# Patient Record
Sex: FEMALE | Race: WHITE | NOT HISPANIC OR LATINO | Employment: FULL TIME | URBAN - METROPOLITAN AREA
[De-identification: names, ages, dates, MRNs, and addresses within clinical notes are randomized per-mention and may not be internally consistent; named-entity substitution may affect disease eponyms.]

---

## 2017-11-10 ENCOUNTER — TRANSFERRED RECORDS (OUTPATIENT)
Dept: HEALTH INFORMATION MANAGEMENT | Facility: CLINIC | Age: 52
End: 2017-11-10

## 2018-02-21 ENCOUNTER — TRANSFERRED RECORDS (OUTPATIENT)
Dept: HEALTH INFORMATION MANAGEMENT | Facility: CLINIC | Age: 53
End: 2018-02-21

## 2018-02-21 LAB — TSH SERPL-ACNC: 2.51 UIU/ML (ref 0.3–5)

## 2018-09-06 ENCOUNTER — TRANSFERRED RECORDS (OUTPATIENT)
Dept: HEALTH INFORMATION MANAGEMENT | Facility: CLINIC | Age: 53
End: 2018-09-06

## 2018-12-13 ENCOUNTER — TRANSFERRED RECORDS (OUTPATIENT)
Dept: HEALTH INFORMATION MANAGEMENT | Facility: CLINIC | Age: 53
End: 2018-12-13

## 2019-04-11 ENCOUNTER — TRANSFERRED RECORDS (OUTPATIENT)
Dept: HEALTH INFORMATION MANAGEMENT | Facility: CLINIC | Age: 54
End: 2019-04-11

## 2019-05-28 ENCOUNTER — OFFICE VISIT (OUTPATIENT)
Dept: FAMILY MEDICINE | Facility: CLINIC | Age: 54
End: 2019-05-28
Payer: COMMERCIAL

## 2019-05-28 VITALS
HEIGHT: 64 IN | TEMPERATURE: 98.2 F | SYSTOLIC BLOOD PRESSURE: 135 MMHG | OXYGEN SATURATION: 98 % | RESPIRATION RATE: 17 BRPM | WEIGHT: 238 LBS | BODY MASS INDEX: 40.63 KG/M2 | HEART RATE: 65 BPM | DIASTOLIC BLOOD PRESSURE: 83 MMHG

## 2019-05-28 DIAGNOSIS — E78.5 HYPERLIPIDEMIA LDL GOAL <130: ICD-10-CM

## 2019-05-28 DIAGNOSIS — Z13.21 ENCOUNTER FOR VITAMIN DEFICIENCY SCREENING: ICD-10-CM

## 2019-05-28 DIAGNOSIS — E66.01 MORBID OBESITY (H): ICD-10-CM

## 2019-05-28 DIAGNOSIS — Z13.220 SCREENING FOR HYPERLIPIDEMIA: ICD-10-CM

## 2019-05-28 DIAGNOSIS — Z00.01 ENCOUNTER FOR ROUTINE ADULT MEDICAL EXAM WITH ABNORMAL FINDINGS: Primary | ICD-10-CM

## 2019-05-28 DIAGNOSIS — Z12.31 VISIT FOR SCREENING MAMMOGRAM: ICD-10-CM

## 2019-05-28 DIAGNOSIS — Z13.1 SCREENING FOR DIABETES MELLITUS: ICD-10-CM

## 2019-05-28 LAB
ALBUMIN SERPL-MCNC: 3.9 G/DL (ref 3.4–5)
ALP SERPL-CCNC: 93 U/L (ref 40–150)
ALT SERPL W P-5'-P-CCNC: 90 U/L (ref 0–50)
ANION GAP SERPL CALCULATED.3IONS-SCNC: 4 MMOL/L (ref 3–14)
AST SERPL W P-5'-P-CCNC: 39 U/L (ref 0–45)
BILIRUB SERPL-MCNC: 0.5 MG/DL (ref 0.2–1.3)
BUN SERPL-MCNC: 17 MG/DL (ref 7–30)
CALCIUM SERPL-MCNC: 8.8 MG/DL (ref 8.5–10.1)
CHLORIDE SERPL-SCNC: 107 MMOL/L (ref 94–109)
CHOLEST SERPL-MCNC: 228 MG/DL
CO2 SERPL-SCNC: 28 MMOL/L (ref 20–32)
CREAT SERPL-MCNC: 0.68 MG/DL (ref 0.52–1.04)
GFR SERPL CREATININE-BSD FRML MDRD: >90 ML/MIN/{1.73_M2}
GLUCOSE SERPL-MCNC: 101 MG/DL (ref 70–99)
HDLC SERPL-MCNC: 60 MG/DL
LDLC SERPL CALC-MCNC: 138 MG/DL
NONHDLC SERPL-MCNC: 168 MG/DL
POTASSIUM SERPL-SCNC: 3.8 MMOL/L (ref 3.4–5.3)
PROT SERPL-MCNC: 7.6 G/DL (ref 6.8–8.8)
SODIUM SERPL-SCNC: 139 MMOL/L (ref 133–144)
TRIGL SERPL-MCNC: 150 MG/DL

## 2019-05-28 PROCEDURE — 82306 VITAMIN D 25 HYDROXY: CPT | Performed by: PHYSICIAN ASSISTANT

## 2019-05-28 PROCEDURE — 99386 PREV VISIT NEW AGE 40-64: CPT | Performed by: PHYSICIAN ASSISTANT

## 2019-05-28 PROCEDURE — 80053 COMPREHEN METABOLIC PANEL: CPT | Performed by: PHYSICIAN ASSISTANT

## 2019-05-28 PROCEDURE — 36415 COLL VENOUS BLD VENIPUNCTURE: CPT | Performed by: PHYSICIAN ASSISTANT

## 2019-05-28 PROCEDURE — 80061 LIPID PANEL: CPT | Performed by: PHYSICIAN ASSISTANT

## 2019-05-28 RX ORDER — LEVOTHYROXINE SODIUM 100 UG/1
100 TABLET ORAL DAILY
COMMUNITY
Start: 2018-11-30 | End: 2020-01-15 | Stop reason: DRUGHIGH

## 2019-05-28 ASSESSMENT — PAIN SCALES - GENERAL: PAINLEVEL: NO PAIN (0)

## 2019-05-28 ASSESSMENT — MIFFLIN-ST. JEOR: SCORE: 1664.56

## 2019-05-28 NOTE — PROGRESS NOTES
SUBJECTIVE:   CC: Thelma Uribe is an 53 year old woman who presents for preventive health visit.     Healthy Habits:    Do you get at least three servings of calcium containing foods daily (dairy, green leafy vegetables, etc.)? yes    Amount of exercise or daily activities, outside of work: 3 day(s) per week    Problems taking medications regularly No    Medication side effects: No    Have you had an eye exam in the past two years? yes    Do you see a dentist twice per year? yes    Do you have sleep apnea, excessive snoring or daytime drowsiness?yes, has CPAP          Today's PHQ-2 Score:   PHQ-2 ( 1999 [employer]) 5/28/2019 2/19/2016   Q1: Little interest or pleasure in doing things 0 0   Q2: Feeling down, depressed or hopeless 0 0   PHQ-2 Score 0 0       Abuse: Current or Past(Physical, Sexual or Emotional)- No  Do you feel safe in your environment? Yes    Social History     Tobacco Use     Smoking status: Never Smoker     Smokeless tobacco: Never Used   Substance Use Topics     Alcohol use: Yes     Alcohol/week: 0.0 oz     Comment: 2-3 Drinks Per Week     If you drink alcohol do you typically have >3 drinks per day or >7 drinks per week? No                     Reviewed orders with patient.  Reviewed health maintenance and updated orders accordingly - Yes  BP Readings from Last 3 Encounters:   05/28/19 135/83   06/20/16 109/81   02/19/16 128/82    Wt Readings from Last 3 Encounters:   05/28/19 108 kg (238 lb)   06/14/16 101.2 kg (223 lb)   02/19/16 104.8 kg (231 lb)                  Patient Active Problem List   Diagnosis     CARDIOVASCULAR SCREENING; LDL GOAL LESS THAN 160     Thyroid nodule     GERD (gastroesophageal reflux disease)     Family history of ischemic heart disease     Morbid obesity (H)     Past Surgical History:   Procedure Laterality Date     C APPENDECTOMY  Age 11     C NONSPECIFIC PROCEDURE  1988    Right oophorectomy and dermoid cyst removal     C NONSPECIFIC PROCEDURE  1997    Partial  Left oophorectomy and dermoid cyst removal     C NONSPECIFIC PROCEDURE      L 4-5 herniated disc w/ laminectomy     C NONSPECIFIC PROCEDURE  2003    Midcarpal Instability-Right wrist reconstruction     C NONSPECIFIC PROCEDURE      Left breast biopsy-benign     COLONOSCOPY WITH CO2 INSUFFLATION N/A 2016    Procedure: COLONOSCOPY WITH CO2 INSUFFLATION;  Surgeon: Miguelito Erazo MD;  Location: [facility] OR     GYN SURGERY       HC REMOVE TONSILS/ADENOIDS,12+ Y/O  Age 14     ORTHOPEDIC SURGERY       THYROIDECTOMY  2012    Procedure:THYROIDECTOMY; LEFT THYROID LOBECTOMY; Surgeon:STEPHANIA FERNÁNDEZ; Location: OR     UPPER GI ENDOSCOPY      bx's okay, rec different PPI       Social History     Tobacco Use     Smoking status: Never Smoker     Smokeless tobacco: Never Used   Substance Use Topics     Alcohol use: Yes     Alcohol/week: 0.0 oz     Comment: 2-3 Drinks Per Week     Family History   Problem Relation Age of Onset     C.A.D. Father 65        Quadruple bypass- age 65     Hypertension Father      Prostate Cancer Father      Blood Disease Father         Anti-cardiolipin syndrome, passed away from aneursym      Lipids Father      Thyroid Disease Father         Hypothyroid     Diabetes Maternal Grandmother         Type 2     Hypertension Mother      Arthritis Mother      Lipids Mother      Osteoporosis Mother         on meds, did have metarsal fx from slip     Thyroid Disease Mother         Hypothyroid     Coronary Artery Disease Brother 54        Stent at 54 yrs     Blood Disease Sister         Vonwillenbrand Syndrome     Cancer Sister 56         of pancreatic cancer w/in 3 mo of dx     Other Cancer Brother         kidney cancer     Other Cancer Sister 59        pancreatic- , smoker     Coronary Artery Disease Brother 49        CABG, smoker     Kidney Cancer Brother 49        smoker     Hyperlipidemia No family hx of      Cerebrovascular Disease No family hx of      Breast Cancer No family  hx of      Colon Cancer No family hx of      Depression No family hx of      Anxiety Disorder No family hx of      Mental Illness No family hx of      Substance Abuse No family hx of      Anesthesia Reaction No family hx of      Asthma No family hx of      Genetic Disorder No family hx of      Obesity No family hx of      Unknown/Adopted No family hx of          Current Outpatient Medications   Medication Sig Dispense Refill     levothyroxine (SYNTHROID/LEVOTHROID) 100 MCG tablet Take 100 mcg by mouth daily         Mammogram Screening: Patient over age 50, mutual decision to screen reflected in health maintenance.    Pertinent mammograms are reviewed under the imaging tab.  History of abnormal Pap smear: NO - age 30-65 PAP every 5 years with negative HPV co-testing recommended  PAP / HPV Latest Ref Rng & Units 2/19/2016 9/25/2012 5/13/2008   PAP - NIL NIL NIL   HPV 16 DNA NEG Negative - -   HPV 18 DNA NEG Negative - -   OTHER HR HPV NEG Negative - -     Reviewed and updated as needed this visit by clinical staff  Tobacco  Allergies  Meds  Med Hx  Surg Hx  Fam Hx  Soc Hx        Reviewed and updated as needed this visit by Provider  Tobacco  Allergies  Meds  Problems  Med Hx  Surg Hx  Fam Hx  Soc Hx           Good cholesterol getting better- last checked in October through work  PA at [employer]  History of hashimotos thyroiditis and thyroidectomy 4 yrs ago.  - follows with endocrinology  Starting .    Did colonoscopy 3 yrs ago   ROS:  CONSTITUTIONAL: NEGATIVE for fever, chills, change in weight  INTEGUMENTARY/SKIN: NEGATIVE for worrisome rashes, moles or lesions  EYES: NEGATIVE for vision changes or irritation  ENT: NEGATIVE for ear, mouth and throat problems  RESP: NEGATIVE for significant cough or SOB  BREAST: NEGATIVE for masses, tenderness or discharge  CV: NEGATIVE for chest pain, palpitations or peripheral edema  GI: NEGATIVE for nausea, abdominal pain, heartburn, or change  "in bowel habits  : NEGATIVE for unusual urinary or vaginal symptoms. No vaginal bleeding.  MUSCULOSKELETAL: NEGATIVE for significant arthralgias or myalgia  NEURO: NEGATIVE for weakness, dizziness or paresthesias  PSYCHIATRIC: NEGATIVE for changes in mood or affect     OBJECTIVE:   /83 (BP Location: Right arm, Patient Position: Chair, Cuff Size: Adult Large)   Pulse 65   Temp 98.2  F (36.8  C) (Oral)   Resp 17   Ht 1.626 m (5' 4\")   Wt 108 kg (238 lb)   LMP 09/10/2012 (Exact Date)   SpO2 98%   Breastfeeding? No   BMI 40.85 kg/m    EXAM:  GENERAL: alert, no distress and obese  EYES: Eyes grossly normal to inspection, PERRL and conjunctivae and sclerae normal  HENT: ear canals and TM's normal, nose and mouth without ulcers or lesions  NECK: no adenopathy, no asymmetry, masses, or scars and thyroid normal to palpation  RESP: lungs clear to auscultation - no rales, rhonchi or wheezes  BREAST: normal without masses, tenderness or nipple discharge and no palpable axillary masses or adenopathy  CV: regular rate and rhythm, normal S1 S2, no S3 or S4, no murmur, click or rub, no peripheral edema and peripheral pulses strong  ABDOMEN: soft, nontender, no hepatosplenomegaly, no masses and bowel sounds normal   (female): normal female external genitalia, normal urethral meatus, vaginal mucosa pink, moist, well rugated, and normal cervix/adnexa/uterus without masses or discharge  MS: no gross musculoskeletal defects noted, no edema  SKIN: no suspicious lesions or rashes  NEURO: Normal strength and tone, mentation intact and speech normal  PSYCH: mentation appears normal, affect normal/bright    Diagnostic Test Results:  none     ASSESSMENT/PLAN:   1. Encounter for routine adult medical exam with abnormal findings  Fasting labs today- lipids were elevated.  Advised through lab results to recheck fasting labs in 3-6 months   Up to date on tetanus  Encouraged shingles vaccine but we do not have it- encouraged to " "check with pharmacy    2. Morbid obesity (H)  Diet is improving. Plans to workout with  and lose weight    3. Visit for screening mammogram    - MA SCREENING DIGITAL BILAT - Future  (s+30); Future    4. Encounter for vitamin deficiency screening    - Vitamin D Deficiency    5. Screening for hyperlipidemia    - Lipid panel reflex to direct LDL Fasting    6. Screening for diabetes mellitus    - Comprehensive metabolic panel    COUNSELING:   Reviewed preventive health counseling, as reflected in patient instructions       Regular exercise       Healthy diet/nutrition       Vision screening       Osteoporosis Prevention/Bone Health       Colon cancer screening    Estimated body mass index is 40.85 kg/m  as calculated from the following:    Height as of this encounter: 1.626 m (5' 4\").    Weight as of this encounter: 108 kg (238 lb).    Weight management plan: Discussed healthy diet and exercise guidelines     reports that she has never smoked. She has never used smokeless tobacco.      Counseling Resources:  ATP IV Guidelines  Pooled Cohorts Equation Calculator  Breast Cancer Risk Calculator  FRAX Risk Assessment  ICSI Preventive Guidelines  Dietary Guidelines for Americans, 2010  USDA's MyPlate  ASA Prophylaxis  Lung CA Screening    Tiana Macdonald PA-C  [facility]  "

## 2019-05-28 NOTE — PATIENT INSTRUCTIONS
Schedule your mammogram at [facility] ([phone]).    I will notify you of lab results via [employer]  Work on diet and exercise.   Patient Education       Preventive Health Recommendations  Female Ages 50 - 64    Yearly exam: See your health care provider every year in order to  o Review health changes.   o Discuss preventive care.    o Review your medicines if your doctor has prescribed any.      Get a Pap test every three years (unless you have an abnormal result and your provider advises testing more often).    If you get Pap tests with HPV test, you only need to test every 5 years, unless you have an abnormal result.     You do not need a Pap test if your uterus was removed (hysterectomy) and you have not had cancer.    You should be tested each year for STDs (sexually transmitted diseases) if you're at risk.     Have a mammogram every 1 to 2 years.    Have a colonoscopy at age 50, or have a yearly FIT test (stool test). These exams screen for colon cancer.      Have a cholesterol test every 5 years, or more often if advised.    Have a diabetes test (fasting glucose) every three years. If you are at risk for diabetes, you should have this test more often.     If you are at risk for osteoporosis (brittle bone disease), think about having a bone density scan (DEXA).    Shots: Get a flu shot each year. Get a tetanus shot every 10 years.    Nutrition:     Eat at least 5 servings of fruits and vegetables each day.    Eat whole-grain bread, whole-wheat pasta and brown rice instead of white grains and rice.    Get adequate Calcium and Vitamin D.     Lifestyle    Exercise at least 150 minutes a week (30 minutes a day, 5 days a week). This will help you control your weight and prevent disease.    Limit alcohol to one drink per day.    No smoking.     Wear sunscreen to prevent skin cancer.     See your dentist every six months for an exam and cleaning.    See your eye doctor every  1 to 2 years.

## 2019-05-28 NOTE — RESULT ENCOUNTER NOTE
Delia Loera  It was a pleasure to meet you in the clinic today.   Your cholesterol was quite high.  Let's see what you can do with diet and exercise and recheck fasting labs in 3-6 months.  You may schedule a fasting lab only appointment.   Your electrolytes, blood sugar, kidney function and liver function were normal.   Your vitamin D level is still pending.   Please call or MyChart my office with any questions or concerns.    Tiana Macdonald, PAC

## 2019-05-29 LAB — DEPRECATED CALCIDIOL+CALCIFEROL SERPL-MC: 28 UG/L (ref 20–75)

## 2019-05-29 NOTE — RESULT ENCOUNTER NOTE
Hello Luz Maria  Your vitamin D level was on the low end of normal. I recommend 2000 units vitamin D daily.   Please call or MyChart my office with any questions or concerns.    Tiana Macdonald, PAC

## 2019-06-21 ENCOUNTER — ANCILLARY PROCEDURE (OUTPATIENT)
Dept: MAMMOGRAPHY | Facility: CLINIC | Age: 54
End: 2019-06-21
Attending: PHYSICIAN ASSISTANT
Payer: COMMERCIAL

## 2019-06-21 DIAGNOSIS — Z12.31 VISIT FOR SCREENING MAMMOGRAM: ICD-10-CM

## 2019-06-21 PROCEDURE — 77067 SCR MAMMO BI INCL CAD: CPT | Performed by: STUDENT IN AN ORGANIZED HEALTH CARE EDUCATION/TRAINING PROGRAM

## 2019-06-21 PROCEDURE — 77063 BREAST TOMOSYNTHESIS BI: CPT | Performed by: STUDENT IN AN ORGANIZED HEALTH CARE EDUCATION/TRAINING PROGRAM

## 2019-06-24 NOTE — RESULT ENCOUNTER NOTE
Delia Loera  Here is your mammogram result.   I do recommend annual mammography.    Please call or MyChart my office with any questions or concerns.    Tiana Macdonald, PAC

## 2019-10-03 ENCOUNTER — HEALTH MAINTENANCE LETTER (OUTPATIENT)
Age: 54
End: 2019-10-03

## 2019-12-17 ENCOUNTER — TRANSFERRED RECORDS (OUTPATIENT)
Dept: HEALTH INFORMATION MANAGEMENT | Facility: CLINIC | Age: 54
End: 2019-12-17

## 2019-12-30 LAB
CREAT SERPL-MCNC: 0.62 MG/DL (ref 0.57–1.11)
GFR SERPL CREATININE-BSD FRML MDRD: >60 ML/MIN/1.73M2

## 2020-01-13 ENCOUNTER — MYC MEDICAL ADVICE (OUTPATIENT)
Dept: FAMILY MEDICINE | Facility: CLINIC | Age: 55
End: 2020-01-13

## 2020-01-15 ENCOUNTER — OFFICE VISIT (OUTPATIENT)
Dept: FAMILY MEDICINE | Facility: CLINIC | Age: 55
End: 2020-01-15
Payer: COMMERCIAL

## 2020-01-15 VITALS
SYSTOLIC BLOOD PRESSURE: 109 MMHG | DIASTOLIC BLOOD PRESSURE: 79 MMHG | HEART RATE: 107 BPM | TEMPERATURE: 98.4 F | OXYGEN SATURATION: 99 %

## 2020-01-15 DIAGNOSIS — D64.9 ANEMIA, UNSPECIFIED TYPE: ICD-10-CM

## 2020-01-15 DIAGNOSIS — T79.A21D TRAUMATIC COMPARTMENT SYNDROME OF RIGHT LOWER EXTREMITY, SUBSEQUENT ENCOUNTER: Primary | ICD-10-CM

## 2020-01-15 DIAGNOSIS — M79.2 NEUROPATHIC PAIN: ICD-10-CM

## 2020-01-15 DIAGNOSIS — E89.0 POSTSURGICAL HYPOTHYROIDISM: Primary | ICD-10-CM

## 2020-01-15 DIAGNOSIS — R19.7 DIARRHEA, UNSPECIFIED TYPE: ICD-10-CM

## 2020-01-15 DIAGNOSIS — E89.0 POSTSURGICAL HYPOTHYROIDISM: ICD-10-CM

## 2020-01-15 DIAGNOSIS — Z98.890 HISTORY OF FASCIOTOMY: ICD-10-CM

## 2020-01-15 LAB
BASOPHILS # BLD AUTO: 0 10E9/L (ref 0–0.2)
BASOPHILS NFR BLD AUTO: 0.4 %
DIFFERENTIAL METHOD BLD: NORMAL
EOSINOPHIL # BLD AUTO: 0.2 10E9/L (ref 0–0.7)
EOSINOPHIL NFR BLD AUTO: 3.2 %
ERYTHROCYTE [DISTWIDTH] IN BLOOD BY AUTOMATED COUNT: 13.1 % (ref 10–15)
HCT VFR BLD AUTO: 38.6 % (ref 35–47)
HGB BLD-MCNC: 12.2 G/DL (ref 11.7–15.7)
LYMPHOCYTES # BLD AUTO: 1.4 10E9/L (ref 0.8–5.3)
LYMPHOCYTES NFR BLD AUTO: 25 %
MCH RBC QN AUTO: 27.7 PG (ref 26.5–33)
MCHC RBC AUTO-ENTMCNC: 31.6 G/DL (ref 31.5–36.5)
MCV RBC AUTO: 88 FL (ref 78–100)
MONOCYTES # BLD AUTO: 0.4 10E9/L (ref 0–1.3)
MONOCYTES NFR BLD AUTO: 7 %
NEUTROPHILS # BLD AUTO: 3.6 10E9/L (ref 1.6–8.3)
NEUTROPHILS NFR BLD AUTO: 64.4 %
PLATELET # BLD AUTO: 244 10E9/L (ref 150–450)
RBC # BLD AUTO: 4.4 10E12/L (ref 3.8–5.2)
TSH SERPL DL<=0.005 MIU/L-ACNC: 1.17 MU/L (ref 0.4–4)
WBC # BLD AUTO: 5.6 10E9/L (ref 4–11)

## 2020-01-15 PROCEDURE — 84443 ASSAY THYROID STIM HORMONE: CPT | Performed by: PHYSICIAN ASSISTANT

## 2020-01-15 PROCEDURE — 36415 COLL VENOUS BLD VENIPUNCTURE: CPT | Performed by: PHYSICIAN ASSISTANT

## 2020-01-15 PROCEDURE — 99214 OFFICE O/P EST MOD 30 MIN: CPT | Performed by: PHYSICIAN ASSISTANT

## 2020-01-15 PROCEDURE — 85025 COMPLETE CBC W/AUTO DIFF WBC: CPT | Performed by: PHYSICIAN ASSISTANT

## 2020-01-15 RX ORDER — METHOCARBAMOL 500 MG/1
500 TABLET, FILM COATED ORAL
COMMUNITY
Start: 2019-01-19 | End: 2020-06-30

## 2020-01-15 RX ORDER — LEVOTHYROXINE SODIUM 112 UG/1
112 TABLET ORAL DAILY
COMMUNITY
Start: 2020-01-15 | End: 2020-01-15

## 2020-01-15 RX ORDER — GABAPENTIN 300 MG/1
CAPSULE ORAL
COMMUNITY
Start: 2019-12-22 | End: 2020-04-27 | Stop reason: DRUGHIGH

## 2020-01-15 RX ORDER — OMEPRAZOLE 40 MG/1
CAPSULE, DELAYED RELEASE ORAL
COMMUNITY
Start: 2019-12-26 | End: 2020-02-07

## 2020-01-15 RX ORDER — GABAPENTIN 600 MG/1
600 TABLET ORAL AT BEDTIME
Qty: 30 TABLET | Refills: 1 | Status: SHIPPED | OUTPATIENT
Start: 2020-01-15 | End: 2020-02-07

## 2020-01-15 RX ORDER — HYDROMORPHONE HYDROCHLORIDE 2 MG/1
2 TABLET ORAL
COMMUNITY
Start: 2019-12-17 | End: 2020-01-22

## 2020-01-15 RX ORDER — LEVOTHYROXINE SODIUM 112 UG/1
112 TABLET ORAL DAILY
Qty: 90 TABLET | Refills: 3 | Status: SHIPPED | OUTPATIENT
Start: 2020-01-15 | End: 2021-04-14

## 2020-01-15 NOTE — PROGRESS NOTES
Subjective     Thelma Uribe is a 54 year old female who presents to clinic today for the following health issues:    HPI       Hospital Follow-up Visit:    Hospital/Nursing Home/IP Rehab Facility: [facility]   Date of Admission: 12/17/19  Date of Discharge: 12/31/19 to 1/5/20 patient was in [facility]   Reason(s) for Admission: Patient had fallen on concert about 15ft fracture in left leg and             Problems taking medications regularly:  Change in stools, not watery but not solid and patient has noticed black patches in stool(patient brought in pictures)        Medication changes since discharge: Started Gabapentin, Omeprazole, Dilaudid and Robaxin, Lovanox Vitamin D and Multivitamin        Problems adhering to non-medication therapy:  None    Summary of hospitalization:  [facility] information obtained and reviewed  Diagnostic Tests/Treatments reviewed.  Follow up needed: orthopedics   Other Healthcare Providers Involved in Patient s Care:         Specialist appointment - orthopedics  Update since discharge: improved.     Post Discharge Medication Reconciliation: discharge medications reconciled, continue medications without change.  Plan of care communicated with patient     Coding guidelines for this visit:  Type of Medical   Decision Making Face-to-Face Visit       within 7 Days of discharge Face-to-Face Visit        within 14 days of discharge   Moderate Complexity 26200 [id number]   High Complexity 70324 [id number]          Was at [facility] due to Debility sp right comminuted proximal tibial plateau fracture with a compartment syndrome sp right knee external fixator, right leg fasciotomy with wound vac placement on 12/17/19 and left navicular fracture with rotatory dislocation sp reduction.  Underwent fasciotomy closures on 12/20/19  Returned to the or on 12/26/19  for ORIF of right tibial plateau fracture with removal fo external fixator and percutaneous pinning of  the left foot caused by traumatic fall from extension ladder in her garage   Has resulted in following impairments-mobility deficit, NWB except 3 months, transfers deficits, Regulo deficits causing the following activity limitations :impaired mobility, impaired ADLs, impaired balance, impaired activity tolerance     Recommended outpatient coagulopathy work up after recovers from form current injury.  She reports family history of von Willebrand's disease and required 2 units pRBC prior to surgery 12/20/19 for acute blood loss anemia     Reports she is adapting.  Has bedside commode.  And nonweight bearing transfers for 3 months.  Before work she was attempting to get into storage unit tin the garage and on extension ladder and as soon as she got to the top whole thing came down and she was caught in rungs and unable to bear weight and crawled into the home to call medics  Spent 2 weeks at [facility]  A lot of abdominal pain last 4 days.  Not hungry in the hospital.  Has had some epigastric pain.  Couple spontaneous nosebleeds. On lovenox for prophylaxis of DVT.  No history of DVT or PE   Was on toradol and ibuprofen in hospital.    Using dilaudid once a day.  Gabapentin helpful taking 300 mg in am and afternoon and 600 mg at bedtime.  Problems with insurance covering dosage since want one tablet - only needs 600 mg tablet sent to pharmacy  Has had brown stool but some flecks of black in it. Loose stools     Patient Active Problem List   Diagnosis     CARDIOVASCULAR SCREENING; LDL GOAL LESS THAN 160     Thyroid nodule     GERD (gastroesophageal reflux disease)     Family history of ischemic heart disease     Morbid obesity (H)     Hyperlipidemia LDL goal <130     Past Surgical History:   Procedure Laterality Date     C APPENDECTOMY  Age 11     C NONSPECIFIC PROCEDURE  1988    Right oophorectomy and dermoid cyst removal     C NONSPECIFIC PROCEDURE  1997    Partial Left oophorectomy and dermoid cyst removal     C NONSPECIFIC  PROCEDURE      L 4-5 herniated disc w/ laminectomy     C NONSPECIFIC PROCEDURE  2003    Midcarpal Instability-Right wrist reconstruction     C NONSPECIFIC PROCEDURE      Left breast biopsy-benign     COLONOSCOPY WITH CO2 INSUFFLATION N/A 2016    Procedure: COLONOSCOPY WITH CO2 INSUFFLATION;  Surgeon: Miguelito Erazo MD;  Location: [facility] OR     GYN SURGERY       HC REMOVE TONSILS/ADENOIDS,12+ Y/O  Age 14     ORTHOPEDIC SURGERY       THYROIDECTOMY  2012    Procedure:THYROIDECTOMY; LEFT THYROID LOBECTOMY; Surgeon:STEPHANIA FERNÁNDEZ; Location: OR     UPPER GI ENDOSCOPY      bx's okay, rec different PPI       Social History     Tobacco Use     Smoking status: Never Smoker     Smokeless tobacco: Never Used   Substance Use Topics     Alcohol use: Yes     Alcohol/week: 0.0 standard drinks     Comment: 2-3 Drinks Per Week     Family History   Problem Relation Age of Onset     C.A.D. Father 65        Quadruple bypass- age 65     Hypertension Father      Prostate Cancer Father      Blood Disease Father         Anti-cardiolipin syndrome, passed away from aneursym      Lipids Father      Thyroid Disease Father         Hypothyroid     Diabetes Maternal Grandmother         Type 2     Hypertension Mother      Arthritis Mother      Lipids Mother      Osteoporosis Mother         on meds, did have metarsal fx from slip     Thyroid Disease Mother         Hypothyroid     Coronary Artery Disease Brother 54        Stent at 54 yrs     Blood Disease Sister         Vonwillenbrand Syndrome     Cancer Sister 56         of pancreatic cancer w/in 3 mo of dx     Other Cancer Brother         kidney cancer     Other Cancer Sister 59        pancreatic- , smoker     Coronary Artery Disease Brother 49        CABG, smoker     Kidney Cancer Brother 49        smoker     Hyperlipidemia No family hx of      Cerebrovascular Disease No family hx of      Breast Cancer No family hx of      Colon Cancer No family hx of       Depression No family hx of      Anxiety Disorder No family hx of      Mental Illness No family hx of      Substance Abuse No family hx of      Anesthesia Reaction No family hx of      Asthma No family hx of      Genetic Disorder No family hx of      Obesity No family hx of      Unknown/Adopted No family hx of          Current Outpatient Medications   Medication Sig Dispense Refill     gabapentin (NEURONTIN) 300 MG capsule Take one capsule in the morning and at noon.  Take two tablets in evening.       gabapentin (NEURONTIN) 600 MG tablet Take 1 tablet (600 mg) by mouth At Bedtime 30 tablet 1     HYDROmorphone (DILAUDID) 2 MG tablet Take 2 mg by mouth       methocarbamol (ROBAXIN) 500 MG tablet Take 500 mg by mouth       Multiple Vitamin (THERAPEUTIC MULTIVITAMIN PO)        omeprazole (PRILOSEC) 40 MG DR capsule        levothyroxine (SYNTHROID/LEVOTHROID) 112 MCG tablet Take 1 tablet (112 mcg) by mouth daily 90 tablet 3     BP Readings from Last 3 Encounters:   01/15/20 109/79   05/28/19 135/83   06/20/16 109/81    Wt Readings from Last 3 Encounters:   05/28/19 108 kg (238 lb)   06/14/16 101.2 kg (223 lb)   02/19/16 104.8 kg (231 lb)                        Reviewed and updated as needed this visit by Provider  Tobacco  Allergies  Meds  Problems  Med Hx  Surg Hx  Fam Hx  Soc Hx          Review of Systems   ROS COMP: Constitutional, HEENT, cardiovascular, pulmonary, gi and gu systems are negative, except as otherwise noted.      Objective    /79 (BP Location: Left arm, Patient Position: Chair, Cuff Size: Adult Large)   Pulse 107   Temp 98.4  F (36.9  C) (Oral)   LMP 09/10/2012 (Exact Date)   SpO2 99%   Breastfeeding No   There is no height or weight on file to calculate BMI.  Physical Exam   GENERAL: alert, no distress and obese  NECK: no adenopathy, no asymmetry, masses, or scars and thyroid normal to palpation  RESP: lungs clear to auscultation - no rales, rhonchi or wheezes  CV: regular rate and  rhythm, normal S1 S2, no S3 or S4, no murmur, click or rub, no peripheral edema and peripheral pulses strong  ABDOMEN: soft, nontender, no hepatosplenomegaly, no masses and bowel sounds normal  Several areas of ecchymoses related to lovenox injections   MS: in wheelchair- deferred to ortho     Diagnostic Test Results:  Results for orders placed or performed in visit on 01/15/20   CBC with platelets differential     Status: None   Result Value Ref Range    WBC 5.6 4.0 - 11.0 10e9/L    RBC Count 4.40 3.8 - 5.2 10e12/L    Hemoglobin 12.2 11.7 - 15.7 g/dL    Hematocrit 38.6 35.0 - 47.0 %    MCV 88 78 - 100 fl    MCH 27.7 26.5 - 33.0 pg    MCHC 31.6 31.5 - 36.5 g/dL    RDW 13.1 10.0 - 15.0 %    Platelet Count 244 150 - 450 10e9/L    % Neutrophils 64.4 %    % Lymphocytes 25.0 %    % Monocytes 7.0 %    % Eosinophils 3.2 %    % Basophils 0.4 %    Absolute Neutrophil 3.6 1.6 - 8.3 10e9/L    Absolute Lymphocytes 1.4 0.8 - 5.3 10e9/L    Absolute Monocytes 0.4 0.0 - 1.3 10e9/L    Absolute Eosinophils 0.2 0.0 - 0.7 10e9/L    Absolute Basophils 0.0 0.0 - 0.2 10e9/L    Diff Method Automated Method    TSH with free T4 reflex     Status: None   Result Value Ref Range    TSH 1.17 0.40 - 4.00 mU/L           Assessment & Plan     1. Traumatic compartment syndrome of right lower extremity, subsequent encounter  Improving.  No weight bearing for 3 months.  Currently on lovenox for prophylaxis   Has been following with ortho     2. History of fasciotomy  Improving follows with ortho     3. Neuropathic pain  Taking 300 mg in am and afternoon and 600 mg at bedtime. Med refilled   - gabapentin (NEURONTIN) 600 MG tablet; Take 1 tablet (600 mg) by mouth At Bedtime  Dispense: 30 tablet; Refill: 1    4. Anemia, unspecified type  Due to acute blood loss.  Cbc normal - anemia resolved.  Once recovered according to discharge summary needs workup for von willebrand's disease  - CBC with platelets differential    5. Postsurgical  hypothyroidism  Normal TSH- refilled med for one year   - TSH with free T4 reflex     6. Diarrhea   I forgot to give patient supplies for collection and rule out infectious, cdiff and melena.    She will have someone  supplies for collection        Patient Instructions   Follow up with us if any change in symptoms   I will notify you of lab results as soon as they are available       Return if symptoms worsen or fail to improve.    Tiana Macdonald PA-C  [facility]

## 2020-01-15 NOTE — PATIENT INSTRUCTIONS
Follow up with us if any change in symptoms   I will notify you of lab results as soon as they are available

## 2020-01-15 NOTE — RESULT ENCOUNTER NOTE
Delia Loera  It was a pleasure to see you today.   Your blood counts and hemoglobin were normal.  I will notify you of your thyroid function once that is back.   Please call or MyChart my office with any questions or concerns.    Tiana Macdonald, PAC

## 2020-01-16 NOTE — RESULT ENCOUNTER NOTE
Delia Loera  Your thyroid function was normal.  I will send over a year supply of your thyroid medication.   Please call or MyChart my office with any questions or concerns.    Tiana Macdonald, PAC

## 2020-02-02 ENCOUNTER — MYC REFILL (OUTPATIENT)
Dept: FAMILY MEDICINE | Facility: CLINIC | Age: 55
End: 2020-02-02

## 2020-02-02 DIAGNOSIS — Z98.890 HISTORY OF OPEN REDUCTION AND INTERNAL FIXATION (ORIF) PROCEDURE: ICD-10-CM

## 2020-02-02 DIAGNOSIS — T79.A21D TRAUMATIC COMPARTMENT SYNDROME OF RIGHT LOWER EXTREMITY, SUBSEQUENT ENCOUNTER: ICD-10-CM

## 2020-02-02 DIAGNOSIS — M79.2 NEUROPATHIC PAIN: ICD-10-CM

## 2020-02-02 DIAGNOSIS — Z98.890 HISTORY OF FASCIOTOMY: ICD-10-CM

## 2020-02-03 NOTE — TELEPHONE ENCOUNTER
Requested Prescriptions   Pending Prescriptions Disp Refills     HYDROmorphone (DILAUDID) 2 MG tablet 40 tablet 0     Sig: Take 1 tablet (2 mg) by mouth every 6 hours as needed for severe pain       There is no refill protocol information for this order          HYDROmorphone (DILAUDID) 2 MG tablet      Last Written Prescription Date:  1/22/2020  Last Fill Quantity: 40,   # refills: 0  Last Office Visit: 1/15/2020  Future Office visit:       Routing refill request to provider for review/approval because:  Drug not on the [facility], P or [facility] refill protocol or controlled substance

## 2020-02-05 RX ORDER — HYDROMORPHONE HYDROCHLORIDE 2 MG/1
TABLET ORAL
Qty: 40 TABLET | Refills: 0 | Status: SHIPPED | OUTPATIENT
Start: 2020-02-05 | End: 2020-06-30

## 2020-02-05 NOTE — TELEPHONE ENCOUNTER
Controlled Substance Refill Request for Hydromorphone  Problem List Complete:  No     PROVIDER TO CONSIDER COMPLETION OF PROBLEM LIST AND OVERVIEW/CONTROLLED SUBSTANCE AGREEMENT    Last Written Prescription Date:  1/22/2020  Last Fill Quantity: 40 tablets   # refills: 0    THE MOST RECENT OFFICE VISIT MUST BE WITHIN THE PAST 3 MONTHS. AT LEAST ONE FACE TO FACE VISIT MUST OCCUR EVERY 6 MONTHS. ADDITIONAL VISITS CAN BE VIRTUAL.  (THIS STATEMENT SHOULD BE DELETED.)    Last Office Visit with [facility] primary care provider: 1/15/2020    Future Office visit: None    Controlled substance agreement:   Encounter-Level CSA:    There are no encounter-level csa.     Patient-Level CSA:    There are no patient-level csa.         Last Urine Drug Screen: No results found for: CDAUT, No results found for: COMDAT, No results found for: THC13, PCP13, COC13, MAMP13, OPI13, AMP13, BZO13, TCA13, MTD13, BAR13, OXY13, PPX13, BUP13     Processing:  Rx to be electronically transmitted to pharmacy by provider      [email or web address]       checked in past 3 months?  No-problem list incomplete so  not checked.           Darek Farley RN, BSN, PHN

## 2020-02-06 ENCOUNTER — TELEPHONE (OUTPATIENT)
Dept: FAMILY MEDICINE | Facility: CLINIC | Age: 55
End: 2020-02-06

## 2020-02-06 NOTE — TELEPHONE ENCOUNTER
Reason for Call:  Other     Detailed comments: please fax the prescription-eop to [phone], being done 2/7/2020    Phone Number Patient can be reached at: Other phone number:[phone]      Best Time: any    Can we leave a detailed message on this number? YES    Call taken on 2/6/2020 at 11:06 AM by Lo Silverio

## 2020-02-07 ENCOUNTER — OFFICE VISIT (OUTPATIENT)
Dept: FAMILY MEDICINE | Facility: CLINIC | Age: 55
End: 2020-02-07
Payer: COMMERCIAL

## 2020-02-07 VITALS
OXYGEN SATURATION: 97 % | TEMPERATURE: 98.5 F | SYSTOLIC BLOOD PRESSURE: 114 MMHG | DIASTOLIC BLOOD PRESSURE: 81 MMHG | HEART RATE: 83 BPM

## 2020-02-07 DIAGNOSIS — S92.252D CLOSED DISPLACED FRACTURE OF NAVICULAR BONE OF LEFT FOOT WITH ROUTINE HEALING, SUBSEQUENT ENCOUNTER: ICD-10-CM

## 2020-02-07 DIAGNOSIS — Z01.818 PREOP GENERAL PHYSICAL EXAM: Primary | ICD-10-CM

## 2020-02-07 DIAGNOSIS — M79.2 NEUROPATHIC PAIN: ICD-10-CM

## 2020-02-07 DIAGNOSIS — T39.395D ADVERSE EFFECT OF NON-STEROIDAL ANTI-INFLAMMATORY DRUG (NSAID), SUBSEQUENT ENCOUNTER: ICD-10-CM

## 2020-02-07 LAB
BASOPHILS # BLD AUTO: 0 10E9/L (ref 0–0.2)
BASOPHILS NFR BLD AUTO: 0.4 %
DIFFERENTIAL METHOD BLD: NORMAL
EOSINOPHIL # BLD AUTO: 0.1 10E9/L (ref 0–0.7)
EOSINOPHIL NFR BLD AUTO: 1.4 %
ERYTHROCYTE [DISTWIDTH] IN BLOOD BY AUTOMATED COUNT: 12.9 % (ref 10–15)
HCT VFR BLD AUTO: 37.7 % (ref 35–47)
HGB BLD-MCNC: 12 G/DL (ref 11.7–15.7)
LYMPHOCYTES # BLD AUTO: 1.2 10E9/L (ref 0.8–5.3)
LYMPHOCYTES NFR BLD AUTO: 21.1 %
MCH RBC QN AUTO: 27.1 PG (ref 26.5–33)
MCHC RBC AUTO-ENTMCNC: 31.8 G/DL (ref 31.5–36.5)
MCV RBC AUTO: 85 FL (ref 78–100)
MONOCYTES # BLD AUTO: 0.3 10E9/L (ref 0–1.3)
MONOCYTES NFR BLD AUTO: 6 %
NEUTROPHILS # BLD AUTO: 4 10E9/L (ref 1.6–8.3)
NEUTROPHILS NFR BLD AUTO: 71.1 %
PLATELET # BLD AUTO: 217 10E9/L (ref 150–450)
RBC # BLD AUTO: 4.43 10E12/L (ref 3.8–5.2)
WBC # BLD AUTO: 5.7 10E9/L (ref 4–11)

## 2020-02-07 PROCEDURE — 85025 COMPLETE CBC W/AUTO DIFF WBC: CPT | Performed by: PHYSICIAN ASSISTANT

## 2020-02-07 PROCEDURE — 36415 COLL VENOUS BLD VENIPUNCTURE: CPT | Performed by: PHYSICIAN ASSISTANT

## 2020-02-07 PROCEDURE — 99214 OFFICE O/P EST MOD 30 MIN: CPT | Performed by: PHYSICIAN ASSISTANT

## 2020-02-07 RX ORDER — GABAPENTIN 600 MG/1
600 TABLET ORAL AT BEDTIME
Qty: 90 TABLET | Refills: 1 | Status: SHIPPED | OUTPATIENT
Start: 2020-02-07 | End: 2020-06-30

## 2020-02-07 RX ORDER — OMEPRAZOLE 40 MG/1
40 CAPSULE, DELAYED RELEASE ORAL DAILY
Qty: 90 CAPSULE | Refills: 1 | Status: SHIPPED | OUTPATIENT
Start: 2020-02-07 | End: 2020-06-30

## 2020-02-07 NOTE — RESULT ENCOUNTER NOTE
Delia Loera  Your blood counts and hemoglobin were normal.   Good luck with surgery.   Please call or MyChart my office with any questions or concerns.    Tiana Macdonald, PAC

## 2020-02-07 NOTE — PROGRESS NOTES
[address] [address]  [phone]  Dept: [phone]    PRE-OP EVALUATION:  Today's date: 2020    Thelma Uribe (: 1965) presents for pre-operative evaluation assessment as requested by Dr. Reyes.  She requires evaluation and anesthesia risk assessment prior to undergoing surgery/procedure for treatment of removal external fixator left ankle .    Fax number for surgical facility: [phone]  Primary Physician: Tiana Macdonald  Type of Anesthesia Anticipated: to be determined    Patient has a Health Care Directive or Living Will:  NO    Preop Questions 2020   Who is doing your surgery? Dr. Reyes   What are you having done? removal of instrumentation to left ankle   Date of Surgery/Procedure: 02/10/2020   Facility or Hospital where procedure/surgery will be performed: [facility]   1.  Do you have a history of Heart attack, stroke, stent, coronary bypass surgery, or other heart surgery? No   2.  Do you ever have any pain or discomfort in your chest? No   3.  Do you have a history of  Heart Failure? No   4.   Are you troubled by shortness of breath when:  walking on a level surface, or up a slight hill, or at night? No   5.  Do you currently have a cold, bronchitis or other respiratory infection? No   6.  Do you have a cough, shortness of breath, or wheezing? No   7.  Do you sometimes get pains in the calves of your legs when you walk? No   8. Do you or anyone in your family have previous history of blood clots? No   9.  Do you or does anyone in your family have a serious bleeding problem such as prolonged bleeding following surgeries or cuts? YES - Patients Sister    10. Have you ever had problems with anemia or been told to take iron pills? No   11. Have you had any abnormal blood loss such as black, tarry or bloody stools, or abnormal vaginal bleeding? No   12. Have you ever had a blood transfusion? YES - 3  blood transfusions    13. Have you or any of your relatives ever had problems with anesthesia? No   14. Do you have sleep apnea, excessive snoring or daytime drowsiness? YES - Sleep apnea   15. Do you have any prosthetic heart valves? No   16. Do you have prosthetic joints? No   17. Is there any chance that you may be pregnant? No         HPI:     HPI related to upcoming procedure: fall on concrete in garage 12/17/19 with right proximal tibial plateau fracture with compartment syndrome requiring fasciotomy and left navicular fracture with dislocation requiring external fixator and pinning of left foot - scheduled for removal of hardware from left foot in 3 days  Today is last day of lovenox - plans to transition to 325 mg daily aspirin and ibuprofen as needed for pain- but will wait until postop to start aspirin  Is scheduled same day surgery- [facility]   History of sleep apnea - uses cpap  GI symptoms have improved and no longer black stool        HYPOTHYROIDISM - Patient has a longstanding history of chronic Hypothyroidism. Patient has been doing well, noting no tremor, insomnia, hair loss or changes in skin texture. Continues to take medications as directed, without adverse reactions or side effects. Last TSH   Lab Results   Component Value Date    TSH 1.17 01/15/2020   .        MEDICAL HISTORY:     Patient Active Problem List    Diagnosis Date Noted     Morbid obesity (H) 05/28/2019     Priority: Medium     Hyperlipidemia LDL goal <130 05/28/2019     Priority: Medium     Family history of ischemic heart disease 02/19/2016     Priority: Medium     Thyroid nodule 05/01/2012     Priority: Medium     Endo (Dr. Kwon) thought benign cyst, L thyroidectomy confirmed benign. Dr. Kwon following thyroid levels s/p surgery. Dx w/ hashimoto's thyroiditis per pt.  2/16- has been stable on levothyroxine through Dr. Kwon, can start following here.    **2/18 consult-   Dr. White concerned about various sx's- fatigue, memory  loss/concentration issues.  He reviewed her chart, and noted 'her TSH was never high in '15 when synthroid was started, which raises the question if she is truly dependent on thyroid hormone.  Given her lymphocytic thyroiditis, she may have another autoimmune condition as well'.  Will review at f/u appt.       GERD (gastroesophageal reflux disease)      Priority: Medium     prilosec (trial off of it in '12/'13), 2/16 has been off meds for awhile without meds       CARDIOVASCULAR SCREENING; LDL GOAL LESS THAN 160 10/31/2010     Priority: Medium      Past Medical History:   Diagnosis Date     Dysthymic disorder     much improved, took celexa x 1mo in '06     Elevated blood pressure reading without diagnosis of hypertension     in '10, much improved with diet/exercise     Exercise induced bronchospasm     takes albuterol very rarely     Female infertility of other specified origin     stopped trying     GERD (gastroesophageal reflux disease)     prilosec for awhile, stable off for yrs (2/16)     Thyroid disease      Past Surgical History:   Procedure Laterality Date     C APPENDECTOMY  Age 11     C NONSPECIFIC PROCEDURE  1988    Right oophorectomy and dermoid cyst removal     C NONSPECIFIC PROCEDURE  1997    Partial Left oophorectomy and dermoid cyst removal     C NONSPECIFIC PROCEDURE  2002    L 4-5 herniated disc w/ laminectomy     C NONSPECIFIC PROCEDURE  4/2003    Midcarpal Instability-Right wrist reconstruction     C NONSPECIFIC PROCEDURE  1999    Left breast biopsy-benign     COLONOSCOPY WITH CO2 INSUFFLATION N/A 6/20/2016    Procedure: COLONOSCOPY WITH CO2 INSUFFLATION;  Surgeon: Miguelito Erazo MD;  Location: [facility] OR     GYN SURGERY       HC REMOVE TONSILS/ADENOIDS,12+ Y/O  Age 14     left navicular fracture   12/17/2019     ORIF right tibial plateau fracture Right 12/26/2019     ORTHOPEDIC SURGERY       percutaneous pinning of foot Left 12/26/2019     right knee external fixator Right 12/17/2019     right leg  fasciotomy with wound vac placement  12/17/2019     THYROIDECTOMY  5/8/2012    Procedure:THYROIDECTOMY; LEFT THYROID LOBECTOMY; Surgeon:STEPHANIA FERNÁNDEZ; Location:[facility] OR     UPPER GI ENDOSCOPY  2/12    bx's okay, rec different PPI     Current Outpatient Medications   Medication Sig Dispense Refill     gabapentin (NEURONTIN) 300 MG capsule Take one capsule in the morning and at noon.  Take two tablets in evening.       gabapentin (NEURONTIN) 600 MG tablet Take 1 tablet (600 mg) by mouth At Bedtime 30 tablet 1     HYDROmorphone (DILAUDID) 2 MG tablet 1-2 every 6 hours as needed for pain. 40 tablet 0     levothyroxine (SYNTHROID/LEVOTHROID) 112 MCG tablet Take 1 tablet (112 mcg) by mouth daily 90 tablet 3     methocarbamol (ROBAXIN) 500 MG tablet Take 500 mg by mouth       Multiple Vitamin (THERAPEUTIC MULTIVITAMIN PO)        omeprazole (PRILOSEC) 40 MG DR capsule        OTC products: Aspirin 325 mg   lovenox today last day     No Known Allergies   Latex Allergy: NO    Social History     Tobacco Use     Smoking status: Never Smoker     Smokeless tobacco: Never Used   Substance Use Topics     Alcohol use: Yes     Alcohol/week: 0.0 standard drinks     Comment: 2-3 Drinks Per Week     History   Drug Use No       REVIEW OF SYSTEMS:   CONSTITUTIONAL: NEGATIVE for fever, chills, change in weight  INTEGUMENTARY/SKIN: NEGATIVE for worrisome rashes, moles or lesions  EYES: NEGATIVE for vision changes or irritation  ENT/MOUTH: NEGATIVE for ear, mouth and throat problems  RESP: NEGATIVE for significant cough or SOB  CV: NEGATIVE for chest pain, palpitations or peripheral edema  GI: NEGATIVE for nausea, abdominal pain, heartburn, or change in bowel habits  : NEGATIVE for frequency, dysuria, or hematuria  MUSCULOSKELETAL:see hpi- in quite a bit of pain- using dilaudid- plans to transition to ibuprofen and continue gabapentin  NEURO: NEGATIVE for weakness, dizziness or paresthesias  ENDOCRINE: NEGATIVE for temperature intolerance,  skin/hair changes  HEME: NEGATIVE for bleeding problems  PSYCHIATRIC: NEGATIVE for changes in mood or affect    EXAM:   /81 (BP Location: Right arm, Patient Position: Chair, Cuff Size: Adult Large)   Pulse 83   Temp 98.5  F (36.9  C) (Oral)   LMP 09/10/2012 (Exact Date)   SpO2 97%   Breastfeeding No     GENERAL APPEARANCE: healthy, alert and no distress     EYES: EOMI, PERRL     HENT: ear canals and TM's normal and nose and mouth without ulcers or lesions     NECK: no adenopathy, no asymmetry, masses, or scars and thyroid normal to palpation     RESP: lungs clear to auscultation - no rales, rhonchi or wheezes     CV: regular rates and rhythm, normal S1 S2, no S3 or S4 and no murmur, click or rub     ABDOMEN:  soft, nontender, no HSM or masses and bowel sounds normal     MS: in wheelchair, deferred to ortho - wearing right leg brace and fixators present left lower extremity     SKIN: no suspicious lesions or rashes     NEURO: Normal strength and tone, sensory exam grossly normal, mentation intact and speech normal     PSYCH: mentation appears normal. and affect normal/bright     LYMPHATICS: No cervical adenopathy    DIAGNOSTICS:     EKG: Not indicated due to non-vascular surgery and low risk of event (age <65 and without cardiac risk factors)  Labs Resulted Today:   Results for orders placed or performed in visit on 02/07/20   CBC with platelets differential     Status: None   Result Value Ref Range    WBC 5.7 4.0 - 11.0 10e9/L    RBC Count 4.43 3.8 - 5.2 10e12/L    Hemoglobin 12.0 11.7 - 15.7 g/dL    Hematocrit 37.7 35.0 - 47.0 %    MCV 85 78 - 100 fl    MCH 27.1 26.5 - 33.0 pg    MCHC 31.8 31.5 - 36.5 g/dL    RDW 12.9 10.0 - 15.0 %    Platelet Count 217 150 - 450 10e9/L    % Neutrophils 71.1 %    % Lymphocytes 21.1 %    % Monocytes 6.0 %    % Eosinophils 1.4 %    % Basophils 0.4 %    Absolute Neutrophil 4.0 1.6 - 8.3 10e9/L    Absolute Lymphocytes 1.2 0.8 - 5.3 10e9/L    Absolute Monocytes 0.3 0.0 - 1.3  10e9/L    Absolute Eosinophils 0.1 0.0 - 0.7 10e9/L    Absolute Basophils 0.0 0.0 - 0.2 10e9/L    Diff Method Automated Method        Recent Labs   Lab Test 01/15/20  1554 12/30/19 05/28/19  0909 02/19/16  1118  09/25/12  1536   HGB 12.2  --   --   --   --  12.0     --   --   --   --  270   NA  --   --  139 140  --   --    POTASSIUM  --   --  3.8 4.4  --   --    CR  --  0.62 0.68 0.72   < >  --     < > = values in this interval not displayed.        IMPRESSION:   Reason for surgery/procedure: left navicular fracture with pinning   Diagnosis/reason for consult: anesthesia clearance     The proposed surgical procedure is considered INTERMEDIATE risk.    REVISED CARDIAC RISK INDEX  The patient has the following serious cardiovascular risks for perioperative complications such as (MI, PE, VFib and 3  AV Block):  No serious cardiac risks  INTERPRETATION: 0 risks: Class I (very low risk - 0.4% complication rate)    The patient has the following additional risks for perioperative complications:  No identified additional risks      ICD-10-CM    1. Preop general physical exam Z01.818 CBC with platelets differential     CANCELED: EKG 12-lead complete w/read - Clinics   2. Closed displaced fracture of navicular bone of left foot with routine healing, subsequent encounter S92.252D    3. Adverse effect of non-steroidal anti-inflammatory drug (NSAID), subsequent encounter T39.395D omeprazole (PRILOSEC) 40 MG DR capsule   4. Neuropathic pain M79.2 gabapentin (NEURONTIN) 600 MG tablet       RECOMMENDATIONS:         --Patient is to take all scheduled medications on the day of surgery EXCEPT for modifications listed below.    Anticoagulant or Antiplatelet Medication Use  ASPIRIN: patient has not started aspirin yet- will wait until after surgery         APPROVAL GIVEN to proceed with proposed procedure, without further diagnostic evaluation       Signed Electronically by: Tiana Macdonald PA-C    Copy of this evaluation  report is provided to requesting physician.    Reviewed chart  Agree with above assessment and plan.   Sona Scherer MD      [place] Preop Guidelines    Revised Cardiac Risk Index

## 2020-04-27 ENCOUNTER — MYC MEDICAL ADVICE (OUTPATIENT)
Dept: FAMILY MEDICINE | Facility: CLINIC | Age: 55
End: 2020-04-27

## 2020-04-27 ENCOUNTER — E-VISIT (OUTPATIENT)
Dept: FAMILY MEDICINE | Facility: CLINIC | Age: 55
End: 2020-04-27
Payer: COMMERCIAL

## 2020-04-27 DIAGNOSIS — M79.2 NEUROPATHIC PAIN: Primary | ICD-10-CM

## 2020-04-27 PROCEDURE — 99421 OL DIG E/M SVC 5-10 MIN: CPT | Performed by: PHYSICIAN ASSISTANT

## 2020-04-27 RX ORDER — GABAPENTIN 600 MG/1
TABLET ORAL
Qty: 90 TABLET | Refills: 1 | Status: SHIPPED | OUTPATIENT
Start: 2020-04-27 | End: 2020-07-05

## 2020-06-28 ENCOUNTER — MYC MEDICAL ADVICE (OUTPATIENT)
Dept: FAMILY MEDICINE | Facility: CLINIC | Age: 55
End: 2020-06-28

## 2020-06-29 ENCOUNTER — MYC MEDICAL ADVICE (OUTPATIENT)
Dept: FAMILY MEDICINE | Facility: CLINIC | Age: 55
End: 2020-06-29

## 2020-06-30 ENCOUNTER — OFFICE VISIT (OUTPATIENT)
Dept: FAMILY MEDICINE | Facility: CLINIC | Age: 55
End: 2020-06-30
Payer: COMMERCIAL

## 2020-06-30 ENCOUNTER — THERAPY VISIT (OUTPATIENT)
Dept: PHYSICAL THERAPY | Facility: CLINIC | Age: 55
End: 2020-06-30
Payer: COMMERCIAL

## 2020-06-30 VITALS
TEMPERATURE: 98 F | DIASTOLIC BLOOD PRESSURE: 75 MMHG | WEIGHT: 231 LBS | SYSTOLIC BLOOD PRESSURE: 111 MMHG | BODY MASS INDEX: 39.44 KG/M2 | HEART RATE: 95 BPM | OXYGEN SATURATION: 97 % | HEIGHT: 64 IN | RESPIRATION RATE: 18 BRPM

## 2020-06-30 DIAGNOSIS — M79.661 PAIN OF RIGHT LOWER LEG: ICD-10-CM

## 2020-06-30 DIAGNOSIS — M25.561 CHRONIC PAIN OF RIGHT KNEE: ICD-10-CM

## 2020-06-30 DIAGNOSIS — E66.01 MORBID OBESITY (H): ICD-10-CM

## 2020-06-30 DIAGNOSIS — G89.29 CHRONIC PAIN OF RIGHT KNEE: ICD-10-CM

## 2020-06-30 DIAGNOSIS — M79.672 LEFT FOOT PAIN: ICD-10-CM

## 2020-06-30 DIAGNOSIS — S89.91XD INJURY OF RIGHT KNEE, SUBSEQUENT ENCOUNTER: ICD-10-CM

## 2020-06-30 DIAGNOSIS — Z01.818 PREOP GENERAL PHYSICAL EXAM: Primary | ICD-10-CM

## 2020-06-30 DIAGNOSIS — Z47.89 AFTERCARE FOLLOWING SURGERY OF THE MUSCULOSKELETAL SYSTEM, NEC: ICD-10-CM

## 2020-06-30 LAB — HGB BLD-MCNC: 13.8 G/DL (ref 11.7–15.7)

## 2020-06-30 PROCEDURE — 85018 HEMOGLOBIN: CPT | Performed by: PHYSICIAN ASSISTANT

## 2020-06-30 PROCEDURE — 97110 THERAPEUTIC EXERCISES: CPT | Mod: GP | Performed by: PHYSICAL THERAPIST

## 2020-06-30 PROCEDURE — 97162 PT EVAL MOD COMPLEX 30 MIN: CPT | Mod: GP | Performed by: PHYSICAL THERAPIST

## 2020-06-30 PROCEDURE — 97112 NEUROMUSCULAR REEDUCATION: CPT | Mod: GP | Performed by: PHYSICAL THERAPIST

## 2020-06-30 PROCEDURE — 36415 COLL VENOUS BLD VENIPUNCTURE: CPT | Performed by: PHYSICIAN ASSISTANT

## 2020-06-30 PROCEDURE — 99214 OFFICE O/P EST MOD 30 MIN: CPT | Performed by: PHYSICIAN ASSISTANT

## 2020-06-30 ASSESSMENT — PAIN SCALES - GENERAL: PAINLEVEL: MODERATE PAIN (4)

## 2020-06-30 ASSESSMENT — MIFFLIN-ST. JEOR: SCORE: 1619.87

## 2020-06-30 NOTE — PROGRESS NOTES
[facility] Initial Evaluation  Subjective:  The history is provided by the patient. No  was used.   Patient Health History  Thelma Uribe being seen for post op right tibial plateau reconstruction, post op fasciotomy, post op left foot stabilization with x-fix and pinning..     Problem began: 12/17/2019.   Problem occurred: fall 12 ft from a ladder, compartment syndrome ensued from fracture   Pain is reported as 4/10 on pain scale.  General health as reported by patient is good.  Pertinent medical history includes: history of fractures, numbness/tingling, thyroid problems and weakness.     Medical allergies: none.   Surgeries include:  Orthopedic surgery.    Current medications:  Anti-inflammatory, pain medication, sleep medication and thyroid medication.    Current occupation is Physician Assistant.   Primary job tasks include:  Computer work, prolonged standing and other.   Other job/home tasks details: walking to hospital from clinic, rounding, OR assisting long hours.                Therapist Generated HPI Evaluation  Problem details: 12/17/19 pt fell off a 12 ft ladder in her garage.  Resulted in R knee tibial plateau fracture and multiple L foot fractures.    1. s/p Fasciotomy right leg, 4 compartment through 2 incisions, Spanning external fixator place for tibial plateau fracture, Closed reduction of LEFT navicular dislocation with splinting on 12/17/2019 Z48.89   2. s/p Removal of ex-fix under anesthesia, Removal of implant superficial, Stress of midfoot under anesthesia on 2/10/2020 by Dr. Reyes Z47.89   3. Closed fracture of right tibial plateau, sequela S82.141S   4. Closed nondisplaced fracture of cuboid of left foot, sequela S92.215S   5. Closed displaced fracture of lateral cuneiform of left foot, sequela S92.222S   6. Closed nondisplaced fracture of anterior process of left calcaneus, sequela S92.025S   7. Closed dislocation of navicular bone of foot,  left, initial encounter S93.315A   Luz Maria saw Dr Mathis today with added dx of loose body, lateral meniscus tear, and primary OA and she is schedule for surgery to the R knee on 7/17/20..         Type of problem:  Left ankle (and R knee).    This is a chronic condition.  Condition occurred with:  A fall/slip.    Site of Pain: knee: ant/lat/med; ankle: anterior.    Pain radiates to:  Lower leg.     Associated symptoms:  Loss of motion/stiffness, loss of strength, edema, numbness and tingling. Symptoms are exacerbated by walking, descending stairs, ascending stairs, transfers, standing, kneeling, bending/squatting and activity (golf, scuba dive, sail, work)  and relieved by ice and rest (gabba (pain)).                              Objective:    Gait:    Gait Type:  Antalgic   Weight Bearing Status:  WBAT   Assistive Devices:  None            Ankle/Foot Evaluation  ROM:    AROM:    Dorsiflexion:  Left:   12  Right:   Lacking 2   Plantarflexion:  Left:  40    Right:  48  Inversion:  Left:  32     Right:  43  Eversion:  0     Right:  7        Strength:    Dorsiflexion:  Left: 5/5     Pain:   Right: 5/5   Pain:  Plantarflexion: Left: 5/5    Pain:+   Right: 5/5  Pain:          Anterior Tibialis:Left: 5/5  Pain:  Right: 4+/5  Pain:  Posterior Tibialis: Left: 5/5  Pain:  Right: 5/5  Pain:  Peroneals: Left: 5/5   Pain:+  Right: 4+/5  Pain:  Extensor Digitorum: Left: 5/5  Pain:Right: 5/5  Pain:            EDEMA:   Left ankle edema present at: 53 cm  Right ankle edema present at:  56 cm      Figure 8 left: 53 cmFigure 8 right: 56 cm  MOBILITY TESTING:       Talocrural Left: normal      Subtalar Left: normal      Midtarsal Left: hypomobile      First Ray Left: normal                                                  Knee Evaluation:  ROM:  Arom wnl knee: good quad set and lacking 10 deg ext with SLR with 4/10.      PROM    Hyperextension: Left: 2   Right:   Extension: Left:   Right:  7  Flexion: Left: 130    Right:   97          Special Tests: Special test for knee: sharp pain with Hoffa fat pad test laterally; neg compression.      Palpation:      Right knee tenderness present at:  Lateral Joint Line    Mobility Testing:  Normal            Functional Testing:  : Trial of Spivey taping, lat to med glide did decrease pain with SLR.                  General     ROS    Assessment/Plan:    Patient is a 54 year old female with right side knee and left side ankle complaints.    Patient has the following significant findings with corresponding treatment plan.                Diagnosis 1:  R knee ORIF tibial plateau fx, R LE fasciotomy, and multiple L forefoot fractures  Pain -  hot/cold therapy, manual therapy and home program  Decreased ROM/flexibility - manual therapy and therapeutic exercise  Decreased joint mobility - manual therapy and therapeutic exercise  Decreased strength - therapeutic exercise and therapeutic activities  Impaired balance - neuro re-education and therapeutic activities  Decreased proprioception - neuro re-education and therapeutic activities  Inflammation - cold therapy and self management/home program  Edema - cold therapy and self management/home program  Impaired gait - gait training  Impaired muscle performance - neuro re-education  Decreased function - therapeutic activities    Therapy Evaluation Codes:   1) History comprised of:   Personal factors that impact the plan of care:      None.    Comorbidity factors that impact the plan of care are:      Numbness/tingling, Weakness and thyroid.     Medications impacting care: Anti-inflammatory.  2) Examination of Body Systems comprised of:   Body structures and functions that impact the plan of care:      Ankle and Knee.   Activity limitations that impact the plan of care are:      Bathing, Driving, Dressing, Jumping, Lifting, Running, Sitting, Sports, Squatting/kneeling, Stairs, Standing, Walking, Working and Sleeping.  3) Clinical presentation  characteristics are:   Evolving/Changing.  4) Decision-Making    Moderate complexity using standardized patient assessment instrument and/or measureable assessment of functional outcome.  Cumulative Therapy Evaluation is: Moderate complexity.    Previous and current functional limitations:  (See Goal Flow Sheet for this information)    Short term and Long term goals: (See Goal Flow Sheet for this information)     Communication ability:  Patient appears to be able to clearly communicate and understand verbal and written communication and follow directions correctly.  Treatment Explanation - The following has been discussed with the patient:   RX ordered/plan of care  Anticipated outcomes  Possible risks and side effects  This patient would benefit from PT intervention to resume normal activities.   Rehab potential is good.    Frequency:  1 X week, once daily  Duration:  for 12 weeks  Discharge Plan:  Achieve all LTG.  Independent in home treatment program.  Reach maximal therapeutic benefit.    Please refer to the daily flowsheet for treatment today, total treatment time and time spent performing 1:1 timed codes.

## 2020-06-30 NOTE — PROGRESS NOTES
[address] [address]  [phone]  Dept: [phone]    PRE-OP EVALUATION:  Today's date: 2020    Thelma Uribe (: 1965) presents for pre-operative evaluation assessment as requested by Dr. Filomena Mathis.  She requires evaluation and anesthesia risk assessment prior to undergoing surgery/procedure for treatment of Right Knee Meniscal tear .    Proposed Surgery/ Procedure: Right Knee Arthroscopy  Date of Surgery/ Procedure: 2020  Time of Surgery/ Procedure: [facility]/Surgical Facility: [facility]  Fax number for surgical facility: [phone]  Surgery Phone number [phone]  Surgeon's office phone number [phone]  Primary Physician: Tiana Macdonald  Type of Anesthesia Anticipated: General    Patient has a Health Care Directive or Living Will:  NO    1. NO - Do you have a history of heart attack, stroke, stent, bypass or surgery on an artery in the head, neck, heart or legs?  2. NO - Do you ever have any pain or discomfort in your chest?  3. NO - Do you have a history of  Heart Failure?  4. NO - Are you troubled by shortness of breath when: walking on the level, up a slight hill or at night?  5. NO - Do you currently have a cold, bronchitis or other respiratory infection?  6. NO - Do you have a cough, shortness of breath or wheezing?  7. NO - Do you sometimes get pains in the calves of your legs when you walk?  8. NO - Do you or anyone in your family have previous history of blood clots?  9. YES - DO YOU OR DOES ANYONE IN YOUR FAMILY HAVE A SERIOUS BLEEDING PROBLEM SUCH AS PROLONGED BLEEDING FOLLOWING SURGERIES OR CUTS? Sister, patient was negative for von willebrands work up.   10. YES - HAVE YOU EVER HAD PROBLEMS WITH ANEMIA OR BEEN TOLD TO TAKE IRON PILLS? Has had 3 blood transfusion srelated to the rt leg injury.    11. NO - Have you had any abnormal blood loss such as black, tarry  or bloody stools, or abnormal vaginal bleeding?  12. YES - HAVE YOU EVER HAD A BLOOD TRANSFUSION? See above  13. NO - Have you or any of your relatives ever had problems with anesthesia?  14. YES - DO YOU HAVE SLEEP APNEA, EXCESSIVE SNORING OR DAYTIME DROWSINESS? Yes uses cpap  15. NO - Do you have any prosthetic heart valves?  16. NO - Do you have prosthetic joints?  17. NO - Is there any chance that you may be pregnant?      HPI:     HPI related to upcoming procedure: hopefully final surgery related to right  tibial plateua fracture 12/1019 s/p fall,    Hx jen on CPAP.      weigth up a bit    See problem list for active medical problems.  Problems all longstanding and stable, except as noted/documented.  See ROS for pertinent symptoms related to these conditions.      MEDICAL HISTORY:     Patient Active Problem List    Diagnosis Date Noted     Chronic pain of right knee 06/30/2020     Priority: Medium     Pain of right lower leg 06/30/2020     Priority: Medium     Left foot pain 06/30/2020     Priority: Medium     Aftercare following surgery of the musculoskeletal system, NEC 06/30/2020     Priority: Medium     Morbid obesity (H) 05/28/2019     Priority: Medium     Hyperlipidemia LDL goal <130 05/28/2019     Priority: Medium     Family history of ischemic heart disease 02/19/2016     Priority: Medium     Thyroid nodule 05/01/2012     Priority: Medium     Endo (Dr. Kwon) thought benign cyst, L thyroidectomy confirmed benign. Dr. Kwon following thyroid levels s/p surgery. Dx w/ hashimoto's thyroiditis per pt.  2/16- has been stable on levothyroxine through Dr. Kwon, can start following here.    **2/18 consult-   Dr. White concerned about various sx's- fatigue, memory loss/concentration issues.  He reviewed her chart, and noted 'her TSH was never high in '15 when synthroid was started, which raises the question if she is truly dependent on thyroid hormone.  Given her lymphocytic thyroiditis, she may have another  autoimmune condition as well'.  Will review at f/u appt.       GERD (gastroesophageal reflux disease)      Priority: Medium     prilosec (trial off of it in '12/'13), 2/16 has been off meds for awhile without meds       CARDIOVASCULAR SCREENING; LDL GOAL LESS THAN 160 10/31/2010     Priority: Medium      Past Medical History:   Diagnosis Date     Dysthymic disorder     much improved, took celexa x 1mo in '06     Elevated blood pressure reading without diagnosis of hypertension     in '10, much improved with diet/exercise     Exercise induced bronchospasm     takes albuterol very rarely     Female infertility of other specified origin     stopped trying     GERD (gastroesophageal reflux disease)     prilosec for awhile, stable off for yrs (2/16)     Thyroid disease      Past Surgical History:   Procedure Laterality Date     C APPENDECTOMY  Age 11     C NONSPECIFIC PROCEDURE  1988    Right oophorectomy and dermoid cyst removal     C NONSPECIFIC PROCEDURE  1997    Partial Left oophorectomy and dermoid cyst removal     C NONSPECIFIC PROCEDURE  2002    L 4-5 herniated disc w/ laminectomy     C NONSPECIFIC PROCEDURE  4/2003    Midcarpal Instability-Right wrist reconstruction     C NONSPECIFIC PROCEDURE  1999    Left breast biopsy-benign     COLONOSCOPY WITH CO2 INSUFFLATION N/A 6/20/2016    Procedure: COLONOSCOPY WITH CO2 INSUFFLATION;  Surgeon: Miguelito Erazo MD;  Location:  OR     GYN SURGERY       HC REMOVE TONSILS/ADENOIDS,12+ Y/O  Age 14     left navicular fracture   12/17/2019     ORIF right tibial plateau fracture Right 12/26/2019     ORTHOPEDIC SURGERY       percutaneous pinning of foot Left 12/26/2019     right knee external fixator Right 12/17/2019     right leg fasciotomy with wound vac placement  12/17/2019     THYROIDECTOMY  5/8/2012    Procedure:THYROIDECTOMY; LEFT THYROID LOBECTOMY; Surgeon:STEPHANIA FERNÁNDEZ; Location: OR     UPPER GI ENDOSCOPY  2/12    bx's okay, rec different PPI     Current  "Outpatient Medications   Medication Sig Dispense Refill     gabapentin (NEURONTIN) 600 MG tablet Take 600 mg in am and 600mg in the afternoon and then 600 mg at bedtime 90 tablet 1     levothyroxine (SYNTHROID/LEVOTHROID) 112 MCG tablet Take 1 tablet (112 mcg) by mouth daily 90 tablet 3     Multiple Vitamin (THERAPEUTIC MULTIVITAMIN PO)        OTC products: None, except as noted above    Allergies   Allergen Reactions     Hydrocodone-Acetaminophen Hives, Itching and Nausea and Vomiting     Oxycodone Hives, Itching, Nausea and Vomiting and Rash      Latex Allergy: NO    Social History     Tobacco Use     Smoking status: Never Smoker     Smokeless tobacco: Never Used   Substance Use Topics     Alcohol use: Yes     Alcohol/week: 0.0 standard drinks     Comment: 2-3 Drinks Per Week     History   Drug Use No       REVIEW OF SYSTEMS:   CONSTITUTIONAL: NEGATIVE for fever, chills, change in weight  INTEGUMENTARY/SKIN: NEGATIVE for worrisome rashes, moles or lesions  EYES: NEGATIVE for vision changes or irritation  ENT/MOUTH: NEGATIVE for ear, mouth and throat problems  RESP: NEGATIVE for significant cough or SOB  BREAST: NEGATIVE for masses, tenderness or discharge  CV: NEGATIVE for chest pain, palpitations or peripheral edema  GI: NEGATIVE for nausea, abdominal pain, heartburn, or change in bowel habits  : NEGATIVE for frequency, dysuria, or hematuria  MUSCULOSKELETAL:{Rt lower leg fx as above  NEURO: NEGATIVE for weakness, dizziness or paresthesias  ENDOCRINE: NEGATIVE for temperature intolerance, skin/hair changes  HEME: NEGATIVE for bleeding problems  PSYCHIATRIC: NEGATIVE for changes in mood or affect    EXAM:   /75 (BP Location: Right arm, Patient Position: Chair, Cuff Size: Adult Large)   Pulse 95   Temp 98  F (36.7  C) (Oral)   Resp 18   Ht 1.613 m (5' 3.5\")   Wt 104.8 kg (231 lb)   LMP 09/10/2012 (Exact Date)   SpO2 97%   BMI 40.28 kg/m      GENERAL APPEARANCE: healthy, alert and no distress     " EYES: EOMI, PERRL     HENT: ear canals and TM's normal and nose and mouth without ulcers or lesions     NECK: no adenopathy, no asymmetry, masses, or scars and thyroid normal to palpation     RESP: lungs clear to auscultation - no rales, rhonchi or wheezes     CV: regular rates and rhythm, normal S1 S2, no S3 or S4 and no murmur, click or rub     ABDOMEN:  soft, nontender, no HSM or masses and bowel sounds normal     MS:Rt knee with marked loss of ROM, well healed surgical scars, used other extremeties freely     SKIN: no suspicious lesions or rashes     NEURO: Normal strength and tone, sensory exam grossly normal, mentation intact and speech normal     PSYCH: mentation appears normal. and affect normal/bright     LYMPHATICS: No cervical adenopathy    DIAGNOSTICS:   EKG: Not indicated due to non-vascular surgery and low risk of event (age <65 and without cardiac risk factors)  Hemoglobin 13.8    Recent Labs   Lab Test 02/07/20  1430 01/15/20  1554 12/30/19 05/28/19  0909 02/19/16  1118   HGB 12.0 12.2  --   --   --     244  --   --   --    NA  --   --   --  139 140   POTASSIUM  --   --   --  3.8 4.4   CR  --   --  0.62 0.68 0.72        IMPRESSION:   Reason for surgery/procedure: Right Knee Meniscal tear .    Proposed Surgery/ Procedure: Right Knee Arthroscopy      The proposed surgical procedure is considered LOW risk.    REVISED CARDIAC RISK INDEX  The patient has the following serious cardiovascular risks for perioperative complications such as (MI, PE, VFib and 3  AV Block):  No serious cardiac risks  INTERPRETATION: 0 risks: Class I (very low risk - 0.4% complication rate)    The patient has the following additional risks for perioperative complications:  Morbid obesity      ICD-10-CM    1. Preop general physical exam  Z01.818 Hemoglobin   2. Injury of right knee, subsequent encounter  S89.91XD    3. Morbid obesity (H)  E66.01        RECOMMENDATIONS:       Obstructive Sleep Apnea (or suspected sleep  apnea)  Patient hasn' t been bringing her CPAP for the ambulatory surgical procedures  And has done very well.      --Patient is to take all scheduled medications on the day of surgery EXCEPT for modifications listed below.    Anticoagulant or Antiplatelet Medication Use  NSAIDS: Naproxen (Naprosyn):   Stop 2-3 days prior to surgery        APPROVAL GIVEN to proceed with proposed procedure, without further diagnostic evaluation       Signed Electronically by: RADHA Briones    Copy of this evaluation report is provided to requesting physician.    [facility] Preop Guidelines    Revised Cardiac Risk Index

## 2020-06-30 NOTE — LETTER
[facility] PHYSICAL THERAPY  [address]  [facility] [address]  [phone]    2020    Re: Thelma Uribe   :   1965  MRN:  [id number]   REFERRING PHYSICIAN:   Mari Mathis    [facility] PHYSICAL [facility]    Date of Initial Evaluation:  2020  Visits:    1  Reason for Referral:     Chronic pain of right knee  Pain of right lower leg  Left foot pain  Aftercare following surgery of the musculoskeletal system, [facility] Initial Evaluation  Subjective:  The history is provided by the patient. No  was used.   Patient Health History  Thelma Uribe being seen for post op right tibial plateau reconstruction, post op fasciotomy, post op left foot stabilization with x-fix and pinning..   Problem began: 2019.   Problem occurred: fall 12 ft from a ladder, compartment syndrome ensued from fracture   Pain is reported as 4/10 on pain scale.  General health as reported by patient is good.  Pertinent medical history includes: history of fractures, numbness/tingling, thyroid problems and weakness.   Medical allergies: none.   Surgeries include:  Orthopedic surgery.    Current medications:  Anti-inflammatory, pain medication, sleep medication and thyroid medication.    Current occupation is Physician Assistant.   Primary job tasks include:  Computer work, prolonged standing and other.   Other job/home tasks details: walking to hospital from clinic, rounding, OR assisting long hours.                Therapist Generated HPI Evaluation  Problem details: 19 pt fell off a 12 ft ladder in her garage.  Resulted in R knee tibial plateau fracture and multiple L foot fractures.    1. s/p Fasciotomy right leg, 4 compartment through 2 incisions, Spanning external fixator place for tibial plateau fracture, Closed reduction of LEFT navicular  dislocation with splinting on 2019 Z48.89   2. s/p Removal of ex-fix under anesthesia, Removal of implant superficial, Stress of midfoot under anesthesia on 2/10/2020 by Dr. Reyes Z47.89   3. Closed fracture of right tibial plateau, sequela S82.141S   4. Closed nondisplaced fracture of cuboid of left foot, sequela S92.215S   5. Closed displaced fracture of lateral cuneiform of left foot, sequela S92.222S   Re: Thelma Uribe   :   1965    Therapist Generated HPI Evaluation (continued)  6. Closed nondisplaced fracture of anterior process of left calcaneus, sequela S92.025S   7. Closed dislocation of navicular bone of foot, left, initial encounter S93.315A   Luz Maria saw Dr Mathis today with added dx of loose body, lateral meniscus tear, and primary OA and she is schedule for surgery to the R knee on 20..         Type of problem:  Left ankle (and R knee).  This is a chronic condition.  Condition occurred with:  A fall/slip.  Site of Pain: knee: ant/lat/med; ankle: anterior.  Pain radiates to:  Lower leg.   Associated symptoms:  Loss of motion/stiffness, loss of strength, edema, numbness and tingling. Symptoms are exacerbated by walking, descending stairs, ascending stairs, transfers, standing, kneeling, bending/squatting and activity (golf, scuba dive, sail, work)  and relieved by ice and rest (gabba (pain)).    Objective:  Gait:    Gait Type:  Antalgic   Weight Bearing Status:  WBAT   Assistive Devices:  None  Ankle/Foot Evaluation  ROM:    AROM:    Dorsiflexion:  Left:   12  Right:   Lacking 2   Plantarflexion:  Left:  40    Right:  48  Inversion:  Left:  32     Right:  43  Eversion:  0     Right:  7  Strength:    Dorsiflexion:  Left: 5/5     Pain:   Right: 5/5   Pain:  Plantarflexion: Left: 5/5    Pain:+   Right: 5/5  Pain:  Anterior Tibialis:Left: 5/5  Pain:  Right: 4+/5  Pain:  Posterior Tibialis: Left: 5/5  Pain:  Right: 5/5  Pain:  Peroneals: Left: 5/5   Pain:+  Right: 4+/5   Pain:  Extensor Digitorum: Left: 5/5  Pain:Right: 5/5  Pain:  EDEMA:   Left ankle edema present at: 53 cm  Right ankle edema present at:  56 cm  Figure 8 left: 53 cm Figure 8 right: 56 cm  MOBILITY TESTING:   Talocrural Left: normal      Subtalar Left: normal      Midtarsal Left: hypomobile      First Ray Left: normal           Knee Evaluation:  ROM:  Arom wnl knee: good quad set and lacking 10 deg ext with SLR with 4/10.    PROM  Hyperextension: Left: 2   Right:   Extension: Left:   Right:  7  Flexion: Left: 130    Right:  97  Special Tests: Special test for knee: sharp pain with Hoffa fat pad test laterally; neg compression.    Re: Thelma Uribe   :   1965    Palpation:  Right knee tenderness present at:  Lateral Joint Line  Mobility Testing:  Normal  Functional Testing:  : Trial of Spivey taping, lat to med glide did decrease pain with SLR.    Assessment/Plan:    Patient is a 54 year old female with right side knee and left side ankle complaints.    Patient has the following significant findings with corresponding treatment plan.                Diagnosis 1:  R knee ORIF tibial plateau fx, R LE fasciotomy, and multiple L forefoot fractures  Pain -  hot/cold therapy, manual therapy and home program  Decreased ROM/flexibility - manual therapy and therapeutic exercise  Decreased joint mobility - manual therapy and therapeutic exercise  Decreased strength - therapeutic exercise and therapeutic activities  Impaired balance - neuro re-education and therapeutic activities  Decreased proprioception - neuro re-education and therapeutic activities  Inflammation - cold therapy and self management/home program  Edema - cold therapy and self management/home program  Impaired gait - gait training  Impaired muscle performance - neuro re-education  Decreased function - therapeutic activities    Therapy Evaluation Codes:   1) History comprised of:   Personal factors that impact the plan of care:      None.     Comorbidity factors that impact the plan of care are:      Numbness/tingling, Weakness and thyroid.     Medications impacting care: Anti-inflammatory.  2) Examination of Body Systems comprised of:   Body structures and functions that impact the plan of care:      Ankle and Knee.   Activity limitations that impact the plan of care are:      Bathing, Driving, Dressing, Jumping, Lifting, Running, Sitting, Sports,   Squatting/kneeling, Stairs, Standing, Walking, Working and Sleeping.  3) Clinical presentation characteristics are:   Evolving/Changing.  4) Decision-Making    Moderate complexity using standardized patient assessment instrument   and/or measureable assessment of functional outcome.  Cumulative Therapy Evaluation is: Moderate complexity.    Previous and current functional limitations:  (See Goal Flow Sheet for this information)    Short term and Long term goals: (See Goal Flow Sheet for this information)   Communication ability:  Patient appears to be able to clearly communicate and understand verbal and written communication and follow directions correctly.  Treatment Explanation - The following has been discussed with the patient:   RX ordered/plan of care, Anticipated outcomes, Possible risks and side effects          Re: Thelma Uribe   :   1965    This patient would benefit from PT intervention to resume normal activities.   Rehab potential is good.  Frequency:  1 X week, once daily  Duration:  for 12 weeks  Discharge Plan:  Achieve all LTG.  Independent in home treatment program.  Reach maximal therapeutic benefit.    Thank you for your referral.    INQUIRIES      Therapist: Kamila Marquez, PT  [facility]ICAL [facility]ERAP[facility]  [address] [address]  Phone: [phone]  Fax: [phone]

## 2020-06-30 NOTE — PROGRESS NOTES
Chart reviewed. Agree with assessment and plan. Approved to proceed with procedure and anesthesia without further work up or referral.  Jenifer Hogan MD

## 2020-07-05 ENCOUNTER — MYC REFILL (OUTPATIENT)
Dept: FAMILY MEDICINE | Facility: CLINIC | Age: 55
End: 2020-07-05

## 2020-07-05 DIAGNOSIS — M79.2 NEUROPATHIC PAIN: ICD-10-CM

## 2020-07-07 ENCOUNTER — THERAPY VISIT (OUTPATIENT)
Dept: PHYSICAL THERAPY | Facility: CLINIC | Age: 55
End: 2020-07-07
Payer: COMMERCIAL

## 2020-07-07 DIAGNOSIS — G89.29 CHRONIC PAIN OF RIGHT KNEE: ICD-10-CM

## 2020-07-07 DIAGNOSIS — M79.661 PAIN OF RIGHT LOWER LEG: ICD-10-CM

## 2020-07-07 DIAGNOSIS — M79.672 LEFT FOOT PAIN: ICD-10-CM

## 2020-07-07 DIAGNOSIS — M25.561 CHRONIC PAIN OF RIGHT KNEE: ICD-10-CM

## 2020-07-07 DIAGNOSIS — Z47.89 AFTERCARE FOLLOWING SURGERY OF THE MUSCULOSKELETAL SYSTEM, NEC: ICD-10-CM

## 2020-07-07 PROCEDURE — 97140 MANUAL THERAPY 1/> REGIONS: CPT | Mod: GP | Performed by: PHYSICAL THERAPIST

## 2020-07-07 PROCEDURE — 97110 THERAPEUTIC EXERCISES: CPT | Mod: GP | Performed by: PHYSICAL THERAPIST

## 2020-07-07 PROCEDURE — 97530 THERAPEUTIC ACTIVITIES: CPT | Mod: GP | Performed by: PHYSICAL THERAPIST

## 2020-07-07 PROCEDURE — 97112 NEUROMUSCULAR REEDUCATION: CPT | Mod: GP | Performed by: PHYSICAL THERAPIST

## 2020-07-07 RX ORDER — GABAPENTIN 600 MG/1
TABLET ORAL
Qty: 90 TABLET | Refills: 1 | Status: SHIPPED | OUTPATIENT
Start: 2020-07-07 | End: 2021-04-13

## 2020-07-07 NOTE — TELEPHONE ENCOUNTER
Requested Prescriptions   Pending Prescriptions Disp Refills     gabapentin (NEURONTIN) 600 MG tablet 90 tablet 1     Sig: Take 600 mg in am and 600mg in the afternoon and then 600 mg at bedtime       There is no refill protocol information for this order        Routing refill request to provider for review/approval because:  Drug not on the [facility] [place] protocol     Darek Farley RN, BSN, PHN

## 2020-07-20 ENCOUNTER — THERAPY VISIT (OUTPATIENT)
Dept: PHYSICAL THERAPY | Facility: CLINIC | Age: 55
End: 2020-07-20
Payer: COMMERCIAL

## 2020-07-20 DIAGNOSIS — M79.672 LEFT FOOT PAIN: ICD-10-CM

## 2020-07-20 DIAGNOSIS — Z47.89 AFTERCARE FOLLOWING SURGERY OF THE MUSCULOSKELETAL SYSTEM, NEC: ICD-10-CM

## 2020-07-20 DIAGNOSIS — M25.561 CHRONIC PAIN OF RIGHT KNEE: ICD-10-CM

## 2020-07-20 DIAGNOSIS — G89.29 CHRONIC PAIN OF RIGHT KNEE: ICD-10-CM

## 2020-07-20 DIAGNOSIS — Z47.89 AFTERCARE FOLLOWING SURGERY OF THE MUSCULOSKELETAL SYSTEM: ICD-10-CM

## 2020-07-20 DIAGNOSIS — M25.561 ACUTE PAIN OF RIGHT KNEE: Primary | ICD-10-CM

## 2020-07-20 DIAGNOSIS — M79.661 PAIN OF RIGHT LOWER LEG: ICD-10-CM

## 2020-07-20 PROCEDURE — 97110 THERAPEUTIC EXERCISES: CPT | Mod: GP | Performed by: PHYSICAL THERAPIST

## 2020-07-20 PROCEDURE — 97161 PT EVAL LOW COMPLEX 20 MIN: CPT | Mod: GP | Performed by: PHYSICAL THERAPIST

## 2020-07-20 PROCEDURE — 97140 MANUAL THERAPY 1/> REGIONS: CPT | Mod: GP | Performed by: PHYSICAL THERAPIST

## 2020-07-20 PROCEDURE — 97112 NEUROMUSCULAR REEDUCATION: CPT | Mod: GP | Performed by: PHYSICAL THERAPIST

## 2020-07-20 ASSESSMENT — ACTIVITIES OF DAILY LIVING (ADL)
KNEE_ACTIVITY_OF_DAILY_LIVING_SCORE: 44.29
GIVING WAY, BUCKLING OR SHIFTING OF KNEE: I HAVE THE SYMPTOM BUT IT DOES NOT AFFECT MY ACTIVITY
STIFFNESS: THE SYMPTOM AFFECTS MY ACTIVITY MODERATELY
PAIN: THE SYMPTOM AFFECTS MY ACTIVITY SLIGHTLY
STAND: ACTIVITY IS SOMEWHAT DIFFICULT
HOW_WOULD_YOU_RATE_THE_CURRENT_FUNCTION_OF_YOUR_KNEE_DURING_YOUR_USUAL_DAILY_ACTIVITIES_ON_A_SCALE_FROM_0_TO_100_WITH_100_BEING_YOUR_LEVEL_OF_KNEE_FUNCTION_PRIOR_TO_YOUR_INJURY_AND_0_BEING_THE_INABILITY_TO_PERFORM_ANY_OF_YOUR_USUAL_DAILY_ACTIVITIES?: 50
SWELLING: THE SYMPTOM AFFECTS MY ACTIVITY MODERATELY
GO UP STAIRS: ACTIVITY IS FAIRLY DIFFICULT
SIT WITH YOUR KNEE BENT: ACTIVITY IS MINIMALLY DIFFICULT
GO DOWN STAIRS: ACTIVITY IS VERY DIFFICULT
WALK: ACTIVITY IS FAIRLY DIFFICULT
KNEE_ACTIVITY_OF_DAILY_LIVING_SUM: 31
RISE FROM A CHAIR: ACTIVITY IS MINIMALLY DIFFICULT
KNEEL ON THE FRONT OF YOUR KNEE: I AM UNABLE TO DO THE ACTIVITY
LIMPING: THE SYMPTOM AFFECTS MY ACTIVITY MODERATELY
HOW_WOULD_YOU_RATE_THE_OVERALL_FUNCTION_OF_YOUR_KNEE_DURING_YOUR_USUAL_DAILY_ACTIVITIES?: ABNORMAL
RAW_SCORE: 31
SQUAT: I AM UNABLE TO DO THE ACTIVITY
AS_A_RESULT_OF_YOUR_KNEE_INJURY,_HOW_WOULD_YOU_RATE_YOUR_CURRENT_LEVEL_OF_DAILY_ACTIVITY?: ABNORMAL
WEAKNESS: THE SYMPTOM AFFECTS MY ACTIVITY MODERATELY

## 2020-07-20 NOTE — PROGRESS NOTES
[facility] Initial Evaluation  Subjective:  The history is provided by the patient. No  was used.   Therapist Generated HPI Evaluation  Problem details: 7/17/20 pt had arthroscopic surgery on her R knee for lateral menisectomy and debridement.  .         Type of problem:  Right knee.    This is a new condition.  Condition occurred with:  Other reason.    Patient reports pain:  Anterior and sub patellar.        Associated symptoms:  Edema, loss of strength and loss of motion/stiffness. Symptoms are exacerbated by bending/squatting, kneeling, sitting, walking, standing, descending stairs, ascending stairs, running and transfers (bike, golf, sail, scuba, work)  and relieved by analgesics, ice and rest.          Patient Health History  Thelma Uribe being seen for R knee scope.     Problem began: 7/17/2020.   Problem occurred: surgery   Pain score: 2-4/10.  General health as reported by patient is excellent.  Pertinent medical history includes: thyroid problems.   Red flags:  None as reported by patient.  Medical allergies: other. Other medical allergies details: oxycondone.   Surgeries include:  Orthopedic surgery and other.    Current medications:  Anti-inflammatory and pain medication.    Current occupation is PA.   Primary job tasks include:  Computer work, prolonged sitting and prolonged standing.                                    Objective:    Gait:    Gait Type:  Antalgic   Weight Bearing Status:  WBAT   Assistive Devices:  None                                                        Knee Evaluation:  ROM:  Arom wnl knee: good quad set and lacking 10 deg ext with SLR with 4/10.      PROM    Hyperextension: Left: 2   Right:   Extension: Left:   Right:  5  Flexion: Left: 135    Right:  100              Edema:    Circumference:      Joint Line:  Left:  44 cm   Right:  45.5 cm    Mobility Testing:  Normal            Functional Testing:  : Spivey taping prior to  surgery was very helpful for PFP; will resume as incisions heal.                  General     ROS    Assessment/Plan:    Patient is a 54 year old female with right side knee complaints.    Patient has the following significant findings with corresponding treatment plan.                Diagnosis 1:  R knee lateral menisectomy and debridement  Pain -  hot/cold therapy, splint/taping/bracing/orthotics and home program  Decreased ROM/flexibility - manual therapy and therapeutic exercise  Decreased strength - therapeutic exercise and therapeutic activities  Impaired balance - neuro re-education and therapeutic activities  Decreased proprioception - neuro re-education and therapeutic activities  Inflammation - cold therapy and self management/home program  Edema - cold therapy and self management/home program  Impaired gait - gait training  Impaired muscle performance - neuro re-education  Decreased function - therapeutic activities    Therapy Evaluation Codes:   1) History comprised of:   Personal factors that impact the plan of care:      None.    Comorbidity factors that impact the plan of care are:      thyroid.     Medications impacting care: Anti-inflammatory, Pain and thyroid.  2) Examination of Body Systems comprised of:   Body structures and functions that impact the plan of care:      Knee.   Activity limitations that impact the plan of care are:      Bathing, Cooking, Driving, Dressing, Jumping, Lifting, Running, Sitting, Sports, Squatting/kneeling, Stairs, Standing, Walking, Working and Sleeping.  3) Clinical presentation characteristics are:   Stable/Uncomplicated.  4) Decision-Making    Low complexity using standardized patient assessment instrument and/or measureable assessment of functional outcome.  Cumulative Therapy Evaluation is: Low complexity.    Previous and current functional limitations:  (See Goal Flow Sheet for this information)    Short term and Long term goals: (See Goal Flow Sheet for this  information)     Communication ability:  Patient appears to be able to clearly communicate and understand verbal and written communication and follow directions correctly.  Treatment Explanation - The following has been discussed with the patient:   RX ordered/plan of care  Anticipated outcomes  Possible risks and side effects  This patient would benefit from PT intervention to resume normal activities.   Rehab potential is good.    Frequency:  1 X week, once daily  Duration:  for 8 weeks  Discharge Plan:  Achieve all LTG.  Independent in home treatment program.  Reach maximal therapeutic benefit.    Please refer to the daily flowsheet for treatment today, total treatment time and time spent performing 1:1 timed codes.

## 2020-07-22 ENCOUNTER — THERAPY VISIT (OUTPATIENT)
Dept: PHYSICAL THERAPY | Facility: CLINIC | Age: 55
End: 2020-07-22
Payer: COMMERCIAL

## 2020-07-22 DIAGNOSIS — G89.29 CHRONIC PAIN OF RIGHT KNEE: ICD-10-CM

## 2020-07-22 DIAGNOSIS — M25.561 CHRONIC PAIN OF RIGHT KNEE: ICD-10-CM

## 2020-07-22 DIAGNOSIS — Z47.89 AFTERCARE FOLLOWING SURGERY OF THE MUSCULOSKELETAL SYSTEM: ICD-10-CM

## 2020-07-22 DIAGNOSIS — M79.661 PAIN OF RIGHT LOWER LEG: ICD-10-CM

## 2020-07-22 DIAGNOSIS — Z47.89 AFTERCARE FOLLOWING SURGERY OF THE MUSCULOSKELETAL SYSTEM, NEC: ICD-10-CM

## 2020-07-22 DIAGNOSIS — M25.561 ACUTE PAIN OF RIGHT KNEE: ICD-10-CM

## 2020-07-22 DIAGNOSIS — M79.672 LEFT FOOT PAIN: ICD-10-CM

## 2020-07-22 PROCEDURE — 97112 NEUROMUSCULAR REEDUCATION: CPT | Mod: GP | Performed by: PHYSICAL THERAPIST

## 2020-07-22 PROCEDURE — 97140 MANUAL THERAPY 1/> REGIONS: CPT | Mod: GP | Performed by: PHYSICAL THERAPIST

## 2020-07-22 PROCEDURE — 97110 THERAPEUTIC EXERCISES: CPT | Mod: GP | Performed by: PHYSICAL THERAPIST

## 2020-07-22 PROCEDURE — 97530 THERAPEUTIC ACTIVITIES: CPT | Mod: GP | Performed by: PHYSICAL THERAPIST

## 2020-07-27 ENCOUNTER — THERAPY VISIT (OUTPATIENT)
Dept: PHYSICAL THERAPY | Facility: CLINIC | Age: 55
End: 2020-07-27
Payer: COMMERCIAL

## 2020-07-27 DIAGNOSIS — Z47.89 AFTERCARE FOLLOWING SURGERY OF THE MUSCULOSKELETAL SYSTEM: ICD-10-CM

## 2020-07-27 DIAGNOSIS — G89.29 CHRONIC PAIN OF RIGHT KNEE: ICD-10-CM

## 2020-07-27 DIAGNOSIS — M25.561 CHRONIC PAIN OF RIGHT KNEE: ICD-10-CM

## 2020-07-27 DIAGNOSIS — Z47.89 AFTERCARE FOLLOWING SURGERY OF THE MUSCULOSKELETAL SYSTEM, NEC: ICD-10-CM

## 2020-07-27 DIAGNOSIS — M79.661 PAIN OF RIGHT LOWER LEG: ICD-10-CM

## 2020-07-27 DIAGNOSIS — M79.672 LEFT FOOT PAIN: ICD-10-CM

## 2020-07-27 DIAGNOSIS — M25.561 ACUTE PAIN OF RIGHT KNEE: ICD-10-CM

## 2020-07-27 PROCEDURE — 97140 MANUAL THERAPY 1/> REGIONS: CPT | Mod: GP | Performed by: PHYSICAL THERAPIST

## 2020-07-27 PROCEDURE — 97110 THERAPEUTIC EXERCISES: CPT | Mod: GP | Performed by: PHYSICAL THERAPIST

## 2020-07-27 PROCEDURE — 97112 NEUROMUSCULAR REEDUCATION: CPT | Mod: GP | Performed by: PHYSICAL THERAPIST

## 2020-07-27 PROCEDURE — 97530 THERAPEUTIC ACTIVITIES: CPT | Mod: GP | Performed by: PHYSICAL THERAPIST

## 2020-08-04 ENCOUNTER — THERAPY VISIT (OUTPATIENT)
Dept: PHYSICAL THERAPY | Facility: CLINIC | Age: 55
End: 2020-08-04
Payer: COMMERCIAL

## 2020-08-04 DIAGNOSIS — Z47.89 AFTERCARE FOLLOWING SURGERY OF THE MUSCULOSKELETAL SYSTEM: ICD-10-CM

## 2020-08-04 DIAGNOSIS — M25.561 ACUTE PAIN OF RIGHT KNEE: ICD-10-CM

## 2020-08-04 PROCEDURE — 97112 NEUROMUSCULAR REEDUCATION: CPT | Mod: GP | Performed by: PHYSICAL THERAPIST

## 2020-08-04 PROCEDURE — 97530 THERAPEUTIC ACTIVITIES: CPT | Mod: GP | Performed by: PHYSICAL THERAPIST

## 2020-08-04 PROCEDURE — 97140 MANUAL THERAPY 1/> REGIONS: CPT | Mod: GP | Performed by: PHYSICAL THERAPIST

## 2020-08-04 PROCEDURE — 97110 THERAPEUTIC EXERCISES: CPT | Mod: GP | Performed by: PHYSICAL THERAPIST

## 2020-08-12 ENCOUNTER — THERAPY VISIT (OUTPATIENT)
Dept: PHYSICAL THERAPY | Facility: CLINIC | Age: 55
End: 2020-08-12
Payer: COMMERCIAL

## 2020-08-12 DIAGNOSIS — Z47.89 AFTERCARE FOLLOWING SURGERY OF THE MUSCULOSKELETAL SYSTEM, NEC: ICD-10-CM

## 2020-08-12 DIAGNOSIS — M25.561 ACUTE PAIN OF RIGHT KNEE: ICD-10-CM

## 2020-08-12 DIAGNOSIS — Z47.89 AFTERCARE FOLLOWING SURGERY OF THE MUSCULOSKELETAL SYSTEM: ICD-10-CM

## 2020-08-12 DIAGNOSIS — M79.661 PAIN OF RIGHT LOWER LEG: ICD-10-CM

## 2020-08-12 DIAGNOSIS — G89.29 CHRONIC PAIN OF RIGHT KNEE: ICD-10-CM

## 2020-08-12 DIAGNOSIS — M25.561 CHRONIC PAIN OF RIGHT KNEE: ICD-10-CM

## 2020-08-12 DIAGNOSIS — M79.672 LEFT FOOT PAIN: ICD-10-CM

## 2020-08-12 PROCEDURE — 97112 NEUROMUSCULAR REEDUCATION: CPT | Mod: GP | Performed by: PHYSICAL THERAPIST

## 2020-08-12 PROCEDURE — 97530 THERAPEUTIC ACTIVITIES: CPT | Mod: GP | Performed by: PHYSICAL THERAPIST

## 2020-08-12 PROCEDURE — 97140 MANUAL THERAPY 1/> REGIONS: CPT | Mod: GP | Performed by: PHYSICAL THERAPIST

## 2020-08-12 PROCEDURE — 97110 THERAPEUTIC EXERCISES: CPT | Mod: GP | Performed by: PHYSICAL THERAPIST

## 2020-08-17 ENCOUNTER — THERAPY VISIT (OUTPATIENT)
Dept: PHYSICAL THERAPY | Facility: CLINIC | Age: 55
End: 2020-08-17
Payer: COMMERCIAL

## 2020-08-17 DIAGNOSIS — M79.661 PAIN OF RIGHT LOWER LEG: ICD-10-CM

## 2020-08-17 DIAGNOSIS — Z47.89 AFTERCARE FOLLOWING SURGERY OF THE MUSCULOSKELETAL SYSTEM: ICD-10-CM

## 2020-08-17 DIAGNOSIS — M79.672 LEFT FOOT PAIN: ICD-10-CM

## 2020-08-17 DIAGNOSIS — Z47.89 AFTERCARE FOLLOWING SURGERY OF THE MUSCULOSKELETAL SYSTEM, NEC: ICD-10-CM

## 2020-08-17 DIAGNOSIS — G89.29 CHRONIC PAIN OF RIGHT KNEE: ICD-10-CM

## 2020-08-17 DIAGNOSIS — M25.561 CHRONIC PAIN OF RIGHT KNEE: ICD-10-CM

## 2020-08-17 DIAGNOSIS — M25.561 ACUTE PAIN OF RIGHT KNEE: ICD-10-CM

## 2020-08-17 PROCEDURE — 97140 MANUAL THERAPY 1/> REGIONS: CPT | Mod: GP | Performed by: PHYSICAL THERAPIST

## 2020-08-17 PROCEDURE — 97110 THERAPEUTIC EXERCISES: CPT | Mod: GP | Performed by: PHYSICAL THERAPIST

## 2020-08-17 PROCEDURE — 97530 THERAPEUTIC ACTIVITIES: CPT | Mod: GP | Performed by: PHYSICAL THERAPIST

## 2020-08-17 PROCEDURE — 97112 NEUROMUSCULAR REEDUCATION: CPT | Mod: GP | Performed by: PHYSICAL THERAPIST

## 2020-09-08 ENCOUNTER — THERAPY VISIT (OUTPATIENT)
Dept: PHYSICAL THERAPY | Facility: CLINIC | Age: 55
End: 2020-09-08
Payer: COMMERCIAL

## 2020-09-08 DIAGNOSIS — Z47.89 AFTERCARE FOLLOWING SURGERY OF THE MUSCULOSKELETAL SYSTEM, NEC: ICD-10-CM

## 2020-09-08 DIAGNOSIS — M79.672 LEFT FOOT PAIN: ICD-10-CM

## 2020-09-08 DIAGNOSIS — M25.561 ACUTE PAIN OF RIGHT KNEE: ICD-10-CM

## 2020-09-08 DIAGNOSIS — G89.29 CHRONIC PAIN OF RIGHT KNEE: ICD-10-CM

## 2020-09-08 DIAGNOSIS — Z47.89 AFTERCARE FOLLOWING SURGERY OF THE MUSCULOSKELETAL SYSTEM: ICD-10-CM

## 2020-09-08 DIAGNOSIS — M79.661 PAIN OF RIGHT LOWER LEG: ICD-10-CM

## 2020-09-08 DIAGNOSIS — M25.561 CHRONIC PAIN OF RIGHT KNEE: ICD-10-CM

## 2020-09-08 PROCEDURE — 97110 THERAPEUTIC EXERCISES: CPT | Mod: GP | Performed by: PHYSICAL THERAPIST

## 2020-09-08 PROCEDURE — 97140 MANUAL THERAPY 1/> REGIONS: CPT | Mod: GP | Performed by: PHYSICAL THERAPIST

## 2020-09-08 PROCEDURE — 97530 THERAPEUTIC ACTIVITIES: CPT | Mod: GP | Performed by: PHYSICAL THERAPIST

## 2020-09-08 PROCEDURE — 97112 NEUROMUSCULAR REEDUCATION: CPT | Mod: GP | Performed by: PHYSICAL THERAPIST

## 2020-09-08 ASSESSMENT — ACTIVITIES OF DAILY LIVING (ADL)
KNEE_ACTIVITY_OF_DAILY_LIVING_SUM: 47
STAND: ACTIVITY IS SOMEWHAT DIFFICULT
GIVING WAY, BUCKLING OR SHIFTING OF KNEE: I DO NOT HAVE THE SYMPTOM
RISE FROM A CHAIR: ACTIVITY IS NOT DIFFICULT
RAW_SCORE: 47
WEAKNESS: THE SYMPTOM AFFECTS MY ACTIVITY MODERATELY
STIFFNESS: THE SYMPTOM AFFECTS MY ACTIVITY MODERATELY
AS_A_RESULT_OF_YOUR_KNEE_INJURY,_HOW_WOULD_YOU_RATE_YOUR_CURRENT_LEVEL_OF_DAILY_ACTIVITY?: ABNORMAL
PAIN: I HAVE THE SYMPTOM BUT IT DOES NOT AFFECT MY ACTIVITY
LIMPING: THE SYMPTOM AFFECTS MY ACTIVITY SLIGHTLY
WALK: ACTIVITY IS SOMEWHAT DIFFICULT
GO UP STAIRS: ACTIVITY IS SOMEWHAT DIFFICULT
SIT WITH YOUR KNEE BENT: ACTIVITY IS NOT DIFFICULT
SQUAT: ACTIVITY IS SOMEWHAT DIFFICULT
KNEEL ON THE FRONT OF YOUR KNEE: ACTIVITY IS SOMEWHAT DIFFICULT
SWELLING: THE SYMPTOM AFFECTS MY ACTIVITY SLIGHTLY
HOW_WOULD_YOU_RATE_THE_OVERALL_FUNCTION_OF_YOUR_KNEE_DURING_YOUR_USUAL_DAILY_ACTIVITIES?: ABNORMAL
HOW_WOULD_YOU_RATE_THE_CURRENT_FUNCTION_OF_YOUR_KNEE_DURING_YOUR_USUAL_DAILY_ACTIVITIES_ON_A_SCALE_FROM_0_TO_100_WITH_100_BEING_YOUR_LEVEL_OF_KNEE_FUNCTION_PRIOR_TO_YOUR_INJURY_AND_0_BEING_THE_INABILITY_TO_PERFORM_ANY_OF_YOUR_USUAL_DAILY_ACTIVITIES?: 75
GO DOWN STAIRS: ACTIVITY IS SOMEWHAT DIFFICULT
KNEE_ACTIVITY_OF_DAILY_LIVING_SCORE: 67.14

## 2020-09-08 NOTE — PROGRESS NOTES
Subjective:  HPI  Physical Exam                    Objective:  System    Physical Exam    General     ROS    Assessment/Plan:    PROGRESS  REPORT    Progress reporting period is from 6/30/20 to 9/8/20.       SUBJECTIVE  Subjective changes noted by patient:  Saw Dr Mathis this morning.  Pleased with progress of knee.  Luz Maria reports she was able to do 79835 steps yesterday (doing 14876 regularly.  L ankle/foot is limiting factor.     Current pain level is 1-4/10  .     Initial Pain level: 4/10.   Changes in function:  Yes (See Goal flowsheet attached for changes in current functional level)  Adverse reaction to treatment or activity: None    OBJECTIVE  L ankle AROM: df 14, pf 47, inv 20, ev 7  R ankle AROM: df 5, pf 49, inv 44, ev 10  R knee PROM: 0-2-122 prone and 125 charlee pose  Good quad set and no ext lag with SLR  Good form with 2 leg squats and SL squats with depth to 40 deg and UE support  Progressing core/gluts, LE CKC, and proprio well        ASSESSMENT/PLAN  Updated problem list and treatment plan: Diagnosis 1:  R knee scope; R tibial plateau fx/ORIF; 4 compartment fasciotomy; multiple L foot fractures  Pain -  hot/cold therapy, manual therapy, splint/taping/bracing/orthotics and home program  Decreased ROM/flexibility - manual therapy and therapeutic exercise  Decreased joint mobility - manual therapy and therapeutic exercise  Decreased strength - therapeutic exercise and therapeutic activities  Impaired balance - neuro re-education and therapeutic activities  Decreased proprioception - neuro re-education and therapeutic activities  Inflammation - cold therapy and self management/home program  Edema - cold therapy and self management/home program  Impaired gait - gait training  Impaired muscle performance - neuro re-education  Decreased function - therapeutic activities  STG/LTGs have been met or progress has been made towards goals:  Yes (See Goal flow sheet completed today.)  Assessment of Progress: The  patient's condition is improving.  Self Management Plans:  Patient has been instructed in a home treatment program.  Patient  has been instructed in self management of symptoms.  I have re-evaluated this patient and find that the nature, scope, duration and intensity of the therapy is appropriate for the medical condition of the patient.  Thelma continues to require the following intervention to meet STG and LTG's:  PT    Recommendations:  Continue 1x/week.    Please refer to the daily flowsheet for treatment today, total treatment time and time spent performing 1:1 timed codes.

## 2020-09-18 ENCOUNTER — THERAPY VISIT (OUTPATIENT)
Dept: PHYSICAL THERAPY | Facility: CLINIC | Age: 55
End: 2020-09-18
Payer: COMMERCIAL

## 2020-09-18 DIAGNOSIS — M25.561 CHRONIC PAIN OF RIGHT KNEE: ICD-10-CM

## 2020-09-18 DIAGNOSIS — G89.29 CHRONIC PAIN OF RIGHT KNEE: ICD-10-CM

## 2020-09-18 DIAGNOSIS — M25.561 ACUTE PAIN OF RIGHT KNEE: ICD-10-CM

## 2020-09-18 DIAGNOSIS — Z47.89 AFTERCARE FOLLOWING SURGERY OF THE MUSCULOSKELETAL SYSTEM: ICD-10-CM

## 2020-09-18 DIAGNOSIS — Z47.89 AFTERCARE FOLLOWING SURGERY OF THE MUSCULOSKELETAL SYSTEM, NEC: ICD-10-CM

## 2020-09-18 DIAGNOSIS — M79.672 LEFT FOOT PAIN: ICD-10-CM

## 2020-09-18 DIAGNOSIS — M79.661 PAIN OF RIGHT LOWER LEG: ICD-10-CM

## 2020-09-18 PROCEDURE — 97110 THERAPEUTIC EXERCISES: CPT | Mod: GP | Performed by: PHYSICAL THERAPIST

## 2020-09-18 PROCEDURE — 97530 THERAPEUTIC ACTIVITIES: CPT | Mod: GP | Performed by: PHYSICAL THERAPIST

## 2020-09-18 PROCEDURE — 97112 NEUROMUSCULAR REEDUCATION: CPT | Mod: GP | Performed by: PHYSICAL THERAPIST

## 2020-09-18 PROCEDURE — 97140 MANUAL THERAPY 1/> REGIONS: CPT | Mod: GP | Performed by: PHYSICAL THERAPIST

## 2020-09-18 NOTE — PROGRESS NOTES
Subjective:  HPI  Physical Exam                    Objective:  System    Physical Exam    General     ROS    Assessment/Plan:    PROGRESS  REPORT    Progress reporting period is from 9/8/20 to 9/18/20.       SUBJECTIVE  Subjective changes noted by patient:  Knee surgery from Dr Mathis was very successful but Luz Maria has returned to work and now experiencing a significant increase in pain/stiffness to the R lower leg and L foot.       Current pain level is 6/10  .    Initial Pain level: 4/10.   Changes in function:  Yes (See Goal flowsheet attached for changes in current functional level)  Adverse reaction to treatment or activity: activity - returning to work    OBJECTIVE  -L foot AROM: df 17, pf 43, inv 20, ev 8  -L ankle MMT: df 5, df/ev 5 pain, df/inv 5 pain, pf in neutral/inv/and ev all 4 sharp pain; unable to perform SL calf raise  -R foot AROM: df 7, pf 51, inv 48, ev 12  -R ankle MMT: df 5 pain, df/ev 5, df/inv 5, pf 5,  pf/inv 4+, pf/ev 4; unable to perform SL calf raise  -R knee PROM: 0-2-120  -Good quad set and no extensor lag with SLR  -Sig tone and tenderness to R gastroc        ASSESSMENT/PLAN  Updated problem list and treatment plan: Diagnosis 1:  R knee scope; R tibial plateau fx/ORIF; 4 compartment fasciotomy; multiple L foot fractures   Pain -  hot/cold therapy, manual therapy and home program  Decreased ROM/flexibility - manual therapy and therapeutic exercise  Decreased joint mobility - manual therapy and therapeutic exercise  Decreased strength - therapeutic exercise and therapeutic activities  Impaired balance - neuro re-education and therapeutic activities  Decreased proprioception - neuro re-education and therapeutic activities  Inflammation - cold therapy and self management/home program  Edema - cold therapy and self management/home program  Impaired gait - gait training  Impaired muscle performance - neuro re-education  Decreased function - therapeutic activities  STG/LTGs have been met or  progress has been made towards goals:  Yes (See Goal flow sheet completed today.)  Assessment of Progress: The patient's condition has exacerbated.  Self Management Plans:  Patient has been instructed in a home treatment program.  Patient  has been instructed in self management of symptoms.  I have re-evaluated this patient and find that the nature, scope, duration and intensity of the therapy is appropriate for the medical condition of the patient.  Thelma continues to require the following intervention to meet STG and LTG's:  PT    Recommendations:  Continue 1-2x/week    Please refer to the daily flowsheet for treatment today, total treatment time and time spent performing 1:1 timed codes.

## 2020-09-18 NOTE — LETTER
[facility]  [address] [address] [place] [id number]  [phone]    2020    Re: Thelma Uribe   :   1965  MRN:  [id number]   REFERRING PHYSICIAN:   Mari Mathis    [facility]    Date of Initial Evaluation:  2020  Visits:  Rxs Used: 10  Reason for Referral:     Acute pain of right knee  Aftercare following surgery of the musculoskeletal system  Chronic pain of right knee  Pain of right lower leg  Left foot pain  Aftercare following surgery of the musculoskeletal system, NEC    PROGRESS  REPORT    Progress reporting period is from 20 to 20.       SUBJECTIVE  Subjective changes noted by patient:  Knee surgery from Dr Mathis was very successful but Luz Maria has returned to work and now experiencing a significant increase in pain/stiffness to the R lower leg and L foot.       Current pain level is 6/10  .    Initial Pain level: 4/10.   Changes in function:  Yes (See Goal flowsheet attached for changes in current functional level)  Adverse reaction to treatment or activity: activity - returning to work    OBJECTIVE  -L foot AROM: df 17, pf 43, inv 20, ev 8  -L ankle MMT: df 5, df/ev 5 pain, df/inv 5 pain, pf in neutral/inv/and ev all 4 sharp pain; unable to perform SL calf raise  -R foot AROM: df 7, pf 51, inv 48, ev 12  -R ankle MMT: df 5 pain, df/ev 5, df/inv 5, pf 5,  pf/inv 4+, pf/ev 4; unable to perform SL calf raise  -R knee PROM: 0-2-120  -Good quad set and no extensor lag with SLR  -Sig tone and tenderness to R gastroc      ASSESSMENT/PLAN  Updated problem list and treatment plan: Diagnosis 1:  R knee scope; R tibial plateau fx/ORIF; 4 compartment fasciotomy; multiple L foot fractures   Pain -  hot/cold therapy, manual therapy and home program  Decreased ROM/flexibility - manual therapy and therapeutic exercise  Decreased joint mobility - manual therapy and therapeutic exercise  Decreased strength - therapeutic exercise  and therapeutic activities  Re: Thelma Uribe   :   1965        Impaired balance - neuro re-education and therapeutic activities  Decreased proprioception - neuro re-education and therapeutic activities  Inflammation - cold therapy and self management/home program  Edema - cold therapy and self management/home program  Impaired gait - gait training  Impaired muscle performance - neuro re-education  Decreased function - therapeutic activities  STG/LTGs have been met or progress has been made towards goals:  Yes (See Goal flow sheet completed today.)  Assessment of Progress: The patient's condition has exacerbated.  Self Management Plans:  Patient has been instructed in a home treatment program.  Patient  has been instructed in self management of symptoms.  I have re-evaluated this patient and find that the nature, scope, duration and intensity of the therapy is appropriate for the medical condition of the patient.  Thelma continues to require the following intervention to meet STG and LTG's:  PT    Recommendations:  Continue 1-2x/week    Thank you for your referral.      INQUIRIES  Therapist: Kamila Marquez, JEISON DAVENPORT [address] [address]  Phone: [phone]  Fax: [phone]

## 2020-09-24 ENCOUNTER — THERAPY VISIT (OUTPATIENT)
Dept: PHYSICAL THERAPY | Facility: CLINIC | Age: 55
End: 2020-09-24
Payer: COMMERCIAL

## 2020-09-24 DIAGNOSIS — Z47.89 AFTERCARE FOLLOWING SURGERY OF THE MUSCULOSKELETAL SYSTEM, NEC: ICD-10-CM

## 2020-09-24 DIAGNOSIS — M25.561 CHRONIC PAIN OF RIGHT KNEE: ICD-10-CM

## 2020-09-24 DIAGNOSIS — G89.29 CHRONIC PAIN OF RIGHT KNEE: ICD-10-CM

## 2020-09-24 DIAGNOSIS — M79.661 PAIN OF RIGHT LOWER LEG: ICD-10-CM

## 2020-09-24 DIAGNOSIS — M25.561 ACUTE PAIN OF RIGHT KNEE: ICD-10-CM

## 2020-09-24 DIAGNOSIS — Z47.89 AFTERCARE FOLLOWING SURGERY OF THE MUSCULOSKELETAL SYSTEM: ICD-10-CM

## 2020-09-24 DIAGNOSIS — M79.672 LEFT FOOT PAIN: ICD-10-CM

## 2020-09-24 PROCEDURE — 97110 THERAPEUTIC EXERCISES: CPT | Mod: GP | Performed by: PHYSICAL THERAPIST

## 2020-09-24 PROCEDURE — 97530 THERAPEUTIC ACTIVITIES: CPT | Mod: GP | Performed by: PHYSICAL THERAPIST

## 2020-09-24 PROCEDURE — 97140 MANUAL THERAPY 1/> REGIONS: CPT | Mod: GP | Performed by: PHYSICAL THERAPIST

## 2020-09-24 PROCEDURE — 97112 NEUROMUSCULAR REEDUCATION: CPT | Mod: GP | Performed by: PHYSICAL THERAPIST

## 2020-10-01 ENCOUNTER — THERAPY VISIT (OUTPATIENT)
Dept: PHYSICAL THERAPY | Facility: CLINIC | Age: 55
End: 2020-10-01
Payer: COMMERCIAL

## 2020-10-01 DIAGNOSIS — Z47.89 AFTERCARE FOLLOWING SURGERY OF THE MUSCULOSKELETAL SYSTEM, NEC: ICD-10-CM

## 2020-10-01 DIAGNOSIS — M25.561 CHRONIC PAIN OF RIGHT KNEE: Primary | ICD-10-CM

## 2020-10-01 DIAGNOSIS — M25.561 ACUTE PAIN OF RIGHT KNEE: ICD-10-CM

## 2020-10-01 DIAGNOSIS — Z47.89 AFTERCARE FOLLOWING SURGERY OF THE MUSCULOSKELETAL SYSTEM: ICD-10-CM

## 2020-10-01 DIAGNOSIS — M79.672 LEFT FOOT PAIN: ICD-10-CM

## 2020-10-01 DIAGNOSIS — G89.29 CHRONIC PAIN OF RIGHT KNEE: Primary | ICD-10-CM

## 2020-10-01 DIAGNOSIS — M79.661 PAIN OF RIGHT LOWER LEG: ICD-10-CM

## 2020-10-01 PROCEDURE — 97112 NEUROMUSCULAR REEDUCATION: CPT | Mod: GP | Performed by: PHYSICAL THERAPIST

## 2020-10-01 PROCEDURE — 97035 APP MDLTY 1+ULTRASOUND EA 15: CPT | Mod: GP | Performed by: PHYSICAL THERAPIST

## 2020-10-01 PROCEDURE — 97140 MANUAL THERAPY 1/> REGIONS: CPT | Mod: GP | Performed by: PHYSICAL THERAPIST

## 2020-10-01 PROCEDURE — 97110 THERAPEUTIC EXERCISES: CPT | Mod: GP | Performed by: PHYSICAL THERAPIST

## 2020-10-13 ENCOUNTER — THERAPY VISIT (OUTPATIENT)
Dept: PHYSICAL THERAPY | Facility: CLINIC | Age: 55
End: 2020-10-13
Payer: COMMERCIAL

## 2020-10-13 DIAGNOSIS — Z47.89 AFTERCARE FOLLOWING SURGERY OF THE MUSCULOSKELETAL SYSTEM: ICD-10-CM

## 2020-10-13 DIAGNOSIS — G89.29 CHRONIC PAIN OF RIGHT KNEE: ICD-10-CM

## 2020-10-13 DIAGNOSIS — Z47.89 AFTERCARE FOLLOWING SURGERY OF THE MUSCULOSKELETAL SYSTEM, NEC: ICD-10-CM

## 2020-10-13 DIAGNOSIS — M25.561 CHRONIC PAIN OF RIGHT KNEE: ICD-10-CM

## 2020-10-13 DIAGNOSIS — M79.661 PAIN OF RIGHT LOWER LEG: ICD-10-CM

## 2020-10-13 DIAGNOSIS — M79.672 LEFT FOOT PAIN: ICD-10-CM

## 2020-10-13 DIAGNOSIS — M25.561 ACUTE PAIN OF RIGHT KNEE: ICD-10-CM

## 2020-10-13 PROCEDURE — 97110 THERAPEUTIC EXERCISES: CPT | Mod: GP | Performed by: PHYSICAL THERAPIST

## 2020-10-13 PROCEDURE — 97140 MANUAL THERAPY 1/> REGIONS: CPT | Mod: GP | Performed by: PHYSICAL THERAPIST

## 2020-10-13 PROCEDURE — 97035 APP MDLTY 1+ULTRASOUND EA 15: CPT | Mod: GP | Performed by: PHYSICAL THERAPIST

## 2020-10-13 PROCEDURE — 97112 NEUROMUSCULAR REEDUCATION: CPT | Mod: GP | Performed by: PHYSICAL THERAPIST

## 2020-10-13 NOTE — LETTER
[facility]  [address] [address] [place] [id number]  [phone]    December 10, 2020    Re: Thelma Uribe   :   1965  MRN:  [id number]   REFERRING PHYSICIAN:   Mari Mathis    [facility]    Date of Initial Evaluation:  2020  Visits:  Rxs Used: 13  Reason for Referral:     Acute pain of right knee  Aftercare following surgery of the musculoskeletal system  Chronic pain of right knee  Pain of right lower leg  Left foot pain  Aftercare following surgery of the musculoskeletal system, NEC      DISCHARGE REPORT    Progress reporting period is from 20 to 10/15/20.     SUBJECTIVE  Subjective: Feeling better after slip. Her knee continues to be swollen. She found US and the taping to be helpful   Current Pain level: 2/10   Initial Pain level: 4/10   Changes in function: Yes, see goal flow sheet for change in function   Adverse reactions: None;   ,     The subjective and objective information are from the last SOAP note on this patient.    OBJECTIVE  Objective: R knee PROM: 0-2-107 prone and 110 heelslide.  + fat pad testing medially remains.  Mild to mod hypomobility of L foot remains.  Tone issues to B gastrocs but improving.      ASSESSMENT/PLAN  Updated problem list and treatment plan: Diagnosis 1:  R knee & lower leg pain and L foot pain  Pain -  hot/cold therapy, manual therapy, splint/taping/bracing/orthotics and home program  Decreased ROM/flexibility - manual therapy and therapeutic exercise  Decreased joint mobility - manual therapy and therapeutic exercise  Decreased strength - therapeutic exercise and therapeutic activities  Impaired balance - neuro re-education and therapeutic activities  Decreased proprioception - neuro re-education and therapeutic activities  Inflammation - cold therapy and self management/home program  Edema - cold therapy and self management/home program  Impaired gait - gait training  Impaired muscle performance -  neuro re-education  Decreased function - therapeutic activities  STG/LTGs have been met or progress has been made towards goals:  Yes (See Goal flow sheet completed today.)  Assessment of Progress: The patient has not returned to therapy. Current status is unknown.  Self Management Plans:  Patient has been instructed in a home treatment program.  Patient  has been instructed in self management of symptoms.  I have re-evaluated this patient and find that the nature, scope, duration and intensity of the therapy is appropriate for the medical condition of the patient.  Thelma continues to require the following intervention to meet STG and LTG's: PT intervention is no longer required to meet STG/LTG.  We will discharge this patient from PT.    Recommendations:  Discharge.      Thank you for your referral.          INQUIRIES  Therapist: Kamila Marquez, PT, ATC   [address] [address]  Phone: [phone]  Fax: [phone]

## 2020-10-15 ENCOUNTER — MYC MEDICAL ADVICE (OUTPATIENT)
Dept: FAMILY MEDICINE | Facility: CLINIC | Age: 55
End: 2020-10-15

## 2020-12-09 PROBLEM — M79.672 LEFT FOOT PAIN: Status: RESOLVED | Noted: 2020-06-30 | Resolved: 2020-12-09

## 2020-12-09 PROBLEM — Z47.89 AFTERCARE FOLLOWING SURGERY OF THE MUSCULOSKELETAL SYSTEM: Status: RESOLVED | Noted: 2020-07-20 | Resolved: 2020-12-09

## 2020-12-09 PROBLEM — M25.561 CHRONIC PAIN OF RIGHT KNEE: Status: RESOLVED | Noted: 2020-06-30 | Resolved: 2020-12-09

## 2020-12-09 PROBLEM — Z47.89 AFTERCARE FOLLOWING SURGERY OF THE MUSCULOSKELETAL SYSTEM, NEC: Status: RESOLVED | Noted: 2020-06-30 | Resolved: 2020-12-09

## 2020-12-09 PROBLEM — G89.29 CHRONIC PAIN OF RIGHT KNEE: Status: RESOLVED | Noted: 2020-06-30 | Resolved: 2020-12-09

## 2020-12-09 PROBLEM — M25.561 ACUTE PAIN OF RIGHT KNEE: Status: RESOLVED | Noted: 2020-07-20 | Resolved: 2020-12-09

## 2020-12-09 PROBLEM — M79.661 PAIN OF RIGHT LOWER LEG: Status: RESOLVED | Noted: 2020-06-30 | Resolved: 2020-12-09

## 2020-12-09 NOTE — PROGRESS NOTES
Subjective:  HPI  Physical Exam                    Objective:  System    Physical Exam    General     ROS    Assessment/Plan:    DISCHARGE REPORT    Progress reporting period is from 9/18/20 to 10/15/20.     SUBJECTIVE  Subjective: Feeling better after slip. Her knee continues to be swollen. She found US and the taping to be helpful   Current Pain level: 2/10   Initial Pain level: 4/10   Changes in function: Yes, see goal flow sheet for change in function   Adverse reactions: None;   ,     The subjective and objective information are from the last SOAP note on this patient.    OBJECTIVE  Objective: R knee PROM: 0-2-107 prone and 110 heelslide.  + fat pad testing medially remains.  Mild to mod hypomobility of L foot remains.  Tone issues to B gastrocs but improving.      ASSESSMENT/PLAN  Updated problem list and treatment plan: Diagnosis 1:  R knee & lower leg pain and L foot pain  Pain -  hot/cold therapy, manual therapy, splint/taping/bracing/orthotics and home program  Decreased ROM/flexibility - manual therapy and therapeutic exercise  Decreased joint mobility - manual therapy and therapeutic exercise  Decreased strength - therapeutic exercise and therapeutic activities  Impaired balance - neuro re-education and therapeutic activities  Decreased proprioception - neuro re-education and therapeutic activities  Inflammation - cold therapy and self management/home program  Edema - cold therapy and self management/home program  Impaired gait - gait training  Impaired muscle performance - neuro re-education  Decreased function - therapeutic activities  STG/LTGs have been met or progress has been made towards goals:  Yes (See Goal flow sheet completed today.)  Assessment of Progress: The patient has not returned to therapy. Current status is unknown.  Self Management Plans:  Patient has been instructed in a home treatment program.  Patient  has been instructed in self management of symptoms.  I have re-evaluated this  patient and find that the nature, scope, duration and intensity of the therapy is appropriate for the medical condition of the patient.  Thelma continues to require the following intervention to meet STG and LTG's: PT intervention is no longer required to meet STG/LTG.  We will discharge this patient from PT.    Recommendations:  Discharge.    Please refer to the daily flowsheet for treatment today, total treatment time and time spent performing 1:1 timed codes.

## 2021-01-15 ENCOUNTER — HEALTH MAINTENANCE LETTER (OUTPATIENT)
Age: 56
End: 2021-01-15

## 2021-02-01 DIAGNOSIS — M79.2 NEUROPATHIC PAIN: ICD-10-CM

## 2021-02-01 RX ORDER — GABAPENTIN 600 MG/1
TABLET ORAL
Qty: 90 TABLET | Refills: 1 | Status: CANCELLED | OUTPATIENT
Start: 2021-02-01

## 2021-02-01 NOTE — TELEPHONE ENCOUNTER
Please call patient and ask if refill of gabapentin is needed- she also tried to schedule an appointment with me in October but was unable through [employer] -please assist with scheduling virtual visit

## 2021-02-01 NOTE — TELEPHONE ENCOUNTER
Routing refill request to provider for review/approval because:  Drug not on the [facility] refill protocol     Adelaida SOLORZANON, RN

## 2021-03-19 NOTE — TELEPHONE ENCOUNTER
Patient contacted. He states has enough Gabapentin until seen, prefers in office appointment. Appointment scheduled for patient.    Gilbert Harris CMA

## 2021-04-13 ENCOUNTER — OFFICE VISIT (OUTPATIENT)
Dept: FAMILY MEDICINE | Facility: CLINIC | Age: 56
End: 2021-04-13
Payer: COMMERCIAL

## 2021-04-13 VITALS
HEART RATE: 68 BPM | BODY MASS INDEX: 43 KG/M2 | SYSTOLIC BLOOD PRESSURE: 112 MMHG | WEIGHT: 246.6 LBS | DIASTOLIC BLOOD PRESSURE: 80 MMHG | RESPIRATION RATE: 12 BRPM | OXYGEN SATURATION: 97 %

## 2021-04-13 DIAGNOSIS — E66.01 MORBID OBESITY (H): ICD-10-CM

## 2021-04-13 DIAGNOSIS — E89.0 POSTSURGICAL HYPOTHYROIDISM: ICD-10-CM

## 2021-04-13 DIAGNOSIS — Z13.220 SCREENING FOR HYPERLIPIDEMIA: ICD-10-CM

## 2021-04-13 DIAGNOSIS — Z82.49 FAMILY HISTORY OF ISCHEMIC HEART DISEASE: Primary | ICD-10-CM

## 2021-04-13 DIAGNOSIS — Z13.1 SCREENING FOR DIABETES MELLITUS: ICD-10-CM

## 2021-04-13 LAB
ALBUMIN SERPL-MCNC: 3.7 G/DL (ref 3.4–5)
ALP SERPL-CCNC: 86 U/L (ref 40–150)
ALT SERPL W P-5'-P-CCNC: 49 U/L (ref 0–50)
ANION GAP SERPL CALCULATED.3IONS-SCNC: 1 MMOL/L (ref 3–14)
AST SERPL W P-5'-P-CCNC: 18 U/L (ref 0–45)
BILIRUB SERPL-MCNC: 0.5 MG/DL (ref 0.2–1.3)
BUN SERPL-MCNC: 19 MG/DL (ref 7–30)
CALCIUM SERPL-MCNC: 8.7 MG/DL (ref 8.5–10.1)
CHLORIDE SERPL-SCNC: 106 MMOL/L (ref 94–109)
CHOLEST SERPL-MCNC: 260 MG/DL
CO2 SERPL-SCNC: 31 MMOL/L (ref 20–32)
CREAT SERPL-MCNC: 0.68 MG/DL (ref 0.52–1.04)
GFR SERPL CREATININE-BSD FRML MDRD: >90 ML/MIN/{1.73_M2}
GLUCOSE SERPL-MCNC: 114 MG/DL (ref 70–99)
HDLC SERPL-MCNC: 60 MG/DL
LDLC SERPL CALC-MCNC: 173 MG/DL
NONHDLC SERPL-MCNC: 200 MG/DL
POTASSIUM SERPL-SCNC: 3.9 MMOL/L (ref 3.4–5.3)
PROT SERPL-MCNC: 7.8 G/DL (ref 6.8–8.8)
SODIUM SERPL-SCNC: 138 MMOL/L (ref 133–144)
TRIGL SERPL-MCNC: 136 MG/DL
TSH SERPL DL<=0.005 MIU/L-ACNC: 1.16 MU/L (ref 0.4–4)

## 2021-04-13 PROCEDURE — 36415 COLL VENOUS BLD VENIPUNCTURE: CPT | Performed by: PHYSICIAN ASSISTANT

## 2021-04-13 PROCEDURE — 84443 ASSAY THYROID STIM HORMONE: CPT | Performed by: PHYSICIAN ASSISTANT

## 2021-04-13 PROCEDURE — 99214 OFFICE O/P EST MOD 30 MIN: CPT | Performed by: PHYSICIAN ASSISTANT

## 2021-04-13 PROCEDURE — 80053 COMPREHEN METABOLIC PANEL: CPT | Performed by: PHYSICIAN ASSISTANT

## 2021-04-13 PROCEDURE — 80061 LIPID PANEL: CPT | Performed by: PHYSICIAN ASSISTANT

## 2021-04-13 NOTE — RESULT ENCOUNTER NOTE
"Dear Luz Maria  Your fasting blood sugar was higher than previous.   Your blood sugar is borderline elevated.    This is in the \"prediabetes\" range.  You are not currently diabetic, but at risk for developing this disease in the future.   Exercise, weight loss,  and limiting carbohydrates and sugars in the diet can be helpful to avoid progression to diabetes.  At minimum we need to check your blood sugar annually.    Please be seen urgently if you develop any signs or symptoms of diabetes (increase thirst or urination, fatigue, blurry vision, unexplained weight loss).   I think we should start a statin.  Your cholesterol is worse   Let me know your thoughts.  I propose starting crestor 10 mg and recheck fasting labs in 8 weeks.  Of course we would need to stop this if you developed muscle pain and recheck your fasting lipids and liver studies in 8 weeks.   Let me know your thoughts.   Tiana Macdonald, PAC        "

## 2021-04-13 NOTE — PROGRESS NOTES
"    Assessment & Plan     Screening for hyperlipidemia  Lipids high- recommend statin-waiting for response via [employer]   - Lipid panel reflex to direct LDL Fasting    Screening for diabetes mellitus  Is prediabetic   - Comprehensive metabolic panel (BMP + Alb, Alk Phos, ALT, AST, Total. Bili, TP)    Postsurgical hypothyroidism  Euthyroid at current dose of 112 mcg   - TSH with free T4 reflex    Family history of ischemic heart disease  Get CT calcium scan to screen - will recommend statin   - JUST IN CASE  - CT Coronary Calcium Scan    Morbid obesity (H)  Diet encouraged - referral for consideration of med to help with weight loss   - JUST IN CASE  - COMPREHENSIVE WEIGHT MANAGEMENT               BMI:   Estimated body mass index is 43 kg/m  as calculated from the following:    Height as of 6/30/20: 1.613 m (5' 3.5\").    Weight as of this encounter: 111.9 kg (246 lb 9.6 oz).   Weight management plan: Patient referred to endocrine and/or weight management specialty    Patient Instructions   I will notify you of lab results via [employer]   Follow up with me for annual exam at earliest convenience  Schedule CT   Schedule follow up with weight management specialty         Return in about 4 weeks (around 5/11/2021), or if symptoms worsen or fail to improve, for in person, Routine preventive.    Tiana Macdonald, PA-C  [facility]    Felice BELTRAN is a 55 year old who presents for the following health issues     HPI     Hyperlipidemia Follow-Up      Are you regularly taking any medication or supplement to lower your cholesterol?   Yes- see med list  not on prescription for lipids     Are you having muscle aches or other side effects that you think could be caused by your cholesterol lowering medication?  No  Patient well known to me.  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  Has been taking Gabapentin but doesn't like way feel on medicine and tapering off    Is wearing compression sock right lower extremity- always edematous   stopped daytime dose- still  300 mg at nightime.      Back with  3 times a week.  Keep putting pounds on. Not physically active in daytime  Is Heaviest been ever- strong family history of CAD at young age  Lipids have been elevated in past         Review of Systems   Constitutional, HEENT, cardiovascular, pulmonary, gi and gu systems are negative, except as otherwise noted.      Objective    /80   Pulse 68   Resp 12   Wt 111.9 kg (246 lb 9.6 oz)   LMP 09/10/2012 (Exact Date)   SpO2 97%   BMI 43.00 kg/m    Body mass index is 43 kg/m .  Physical Exam   GENERAL: alert, no distress and obese  RESP: lungs clear to auscultation - no rales, rhonchi or wheezes  CV: regular rate and rhythm, normal S1 S2, no S3 or S4, no murmur, click or rub, no peripheral edema and peripheral pulses strong  MS: right lower extremity with compression stocking, normal gait     Results for orders placed or performed in visit on 04/13/21   Lipid panel reflex to direct LDL Fasting     Status: Abnormal   Result Value Ref Range    Cholesterol 260 (H) <200 mg/dL    Triglycerides 136 <150 mg/dL    HDL Cholesterol 60 >49 mg/dL    LDL Cholesterol Calculated 173 (H) <100 mg/dL    Non HDL Cholesterol 200 (H) <130 mg/dL   TSH with free T4 reflex     Status: None   Result Value Ref Range    TSH 1.16 0.40 - 4.00 mU/L   Comprehensive metabolic panel (BMP + Alb, Alk Phos, ALT, AST, Total. Bili, TP)     Status: Abnormal   Result Value Ref Range    Sodium 138 133 - 144 mmol/L    Potassium 3.9 3.4 - 5.3 mmol/L    Chloride 106 94 - 109 mmol/L    Carbon Dioxide 31 20 - 32 mmol/L    Anion Gap 1 (L) 3 - 14 mmol/L    Glucose 114 (H) 70 - 99 mg/dL    Urea Nitrogen 19 7 - 30 mg/dL    Creatinine 0.68 0.52 - 1.04 mg/dL    GFR Estimate >90 >60 mL/min/[1.73_m2]    GFR Estimate If Black  >90 >60 mL/min/[1.73_m2]    Calcium 8.7 8.5 - 10.1 mg/dL    Bilirubin Total 0.5 0.2 - 1.3 mg/dL    Albumin 3.7 3.4 - 5.0 g/dL    Protein Total 7.8 6.8 - 8.8 g/dL    Alkaline Phosphatase 86 40 - 150 U/L    ALT 49 0 - 50 U/L    AST 18 0 - 45 U/L

## 2021-04-13 NOTE — RESULT ENCOUNTER NOTE
Delia Loera  Your thyroid function was normal.  Continue levothyroxine as you have been taking.   Please call or MyChart my office with any questions or concerns.    Tiana Macdonald, PAC

## 2021-04-13 NOTE — PATIENT INSTRUCTIONS
I will notify you of lab results via [facility] [facility]   Follow up with me for annual exam at earliest convenience  Schedule CT   Schedule follow up with weight management specialty

## 2021-04-14 ENCOUNTER — MYC MEDICAL ADVICE (OUTPATIENT)
Dept: FAMILY MEDICINE | Facility: CLINIC | Age: 56
End: 2021-04-14

## 2021-04-14 DIAGNOSIS — E78.5 HYPERLIPIDEMIA LDL GOAL <130: Primary | ICD-10-CM

## 2021-04-14 RX ORDER — ROSUVASTATIN CALCIUM 10 MG/1
10 TABLET, COATED ORAL DAILY
Qty: 30 TABLET | Refills: 1 | Status: SHIPPED | OUTPATIENT
Start: 2021-04-14 | End: 2021-06-22

## 2021-04-14 RX ORDER — LEVOTHYROXINE SODIUM 112 UG/1
112 TABLET ORAL DAILY
Qty: 90 TABLET | Refills: 3 | Status: SHIPPED | OUTPATIENT
Start: 2021-04-14 | End: 2022-06-01

## 2021-04-19 ENCOUNTER — ANCILLARY PROCEDURE (OUTPATIENT)
Dept: CT IMAGING | Facility: CLINIC | Age: 56
End: 2021-04-19
Attending: PHYSICIAN ASSISTANT

## 2021-04-19 DIAGNOSIS — Z82.49 FAMILY HISTORY OF ISCHEMIC HEART DISEASE: ICD-10-CM

## 2021-04-19 PROCEDURE — 75571 CT HRT W/O DYE W/CA TEST: CPT | Performed by: STUDENT IN AN ORGANIZED HEALTH CARE EDUCATION/TRAINING PROGRAM

## 2021-04-19 NOTE — RESULT ENCOUNTER NOTE
Dear Luz Maria  There was a single pulmonary nodule noted on CT.   No follow up is necessary.  Please call or MyChart my office with any questions or concerns.    Tiana Macdonald, PAC

## 2021-04-19 NOTE — RESULT ENCOUNTER NOTE
Dear Luz Maria  Your CT looks good from a coronary artery disease perspective.   Please call or MyChart my office with any questions or concerns.    Tiana Macdonald, PAC

## 2021-05-06 ENCOUNTER — MYC MEDICAL ADVICE (OUTPATIENT)
Dept: FAMILY MEDICINE | Facility: CLINIC | Age: 56
End: 2021-05-06

## 2021-05-06 NOTE — TELEPHONE ENCOUNTER
Panel Management Review      Patient has the following on her problem list: None      Composite cancer screening  Chart review shows that this patient is due/due soon for the following Pap Smear and Mammogram  Summary:    Patient is due/failing the following:   MAMMOGRAM and PAP    Action needed:   Patient needs office visit for pap and schedule mammogram .    Type of outreach:    Sent hopscoutt message.    Questions for provider review:    None                                                                                                                                    Tiana Macdonald, PAC              Chart routed to not routed  .

## 2021-06-22 ENCOUNTER — VIRTUAL VISIT (OUTPATIENT)
Dept: FAMILY MEDICINE | Facility: CLINIC | Age: 56
End: 2021-06-22
Payer: COMMERCIAL

## 2021-06-22 DIAGNOSIS — L53.9 ERYTHEMA OF BREAST: Primary | ICD-10-CM

## 2021-06-22 DIAGNOSIS — N63.0 BREAST SWELLING: ICD-10-CM

## 2021-06-22 PROCEDURE — 99214 OFFICE O/P EST MOD 30 MIN: CPT | Mod: 95 | Performed by: FAMILY MEDICINE

## 2021-06-22 RX ORDER — DICLOXACILLIN SODIUM 500 MG
500 CAPSULE ORAL 4 TIMES DAILY
Qty: 28 CAPSULE | Refills: 0 | Status: SHIPPED | OUTPATIENT
Start: 2021-06-22 | End: 2021-06-29

## 2021-06-22 NOTE — Clinical Note
Juliai- recommended f/u with you end of this week but you have no openings. We talked about Tues, next week then but you may need to give okay for same day slot??

## 2021-06-22 NOTE — PATIENT INSTRUCTIONS
Please call [facility] in [place] at [phone] to schedule diagnostic breast imaging.   Be seen in ER if high fever or expanding redness or feeling progressively ill.  Be seen in follow-up in clinic later this week or early next if ongoing symptoms.
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
## 2021-07-01 ENCOUNTER — ANCILLARY PROCEDURE (OUTPATIENT)
Dept: ULTRASOUND IMAGING | Facility: CLINIC | Age: 56
End: 2021-07-01
Attending: FAMILY MEDICINE
Payer: COMMERCIAL

## 2021-07-01 ENCOUNTER — ANCILLARY PROCEDURE (OUTPATIENT)
Dept: MAMMOGRAPHY | Facility: CLINIC | Age: 56
End: 2021-07-01
Attending: FAMILY MEDICINE
Payer: COMMERCIAL

## 2021-07-01 DIAGNOSIS — L53.9 ERYTHEMA OF BREAST: ICD-10-CM

## 2021-07-01 DIAGNOSIS — N63.0 BREAST SWELLING: ICD-10-CM

## 2021-07-01 PROCEDURE — 76642 ULTRASOUND BREAST LIMITED: CPT | Mod: LT | Performed by: RADIOLOGY

## 2021-07-01 PROCEDURE — 77066 DX MAMMO INCL CAD BI: CPT | Performed by: RADIOLOGY

## 2021-07-01 PROCEDURE — G0279 TOMOSYNTHESIS, MAMMO: HCPCS | Performed by: RADIOLOGY

## 2021-08-30 ENCOUNTER — VIRTUAL VISIT (OUTPATIENT)
Dept: ENDOCRINOLOGY | Facility: CLINIC | Age: 56
End: 2021-08-30
Payer: COMMERCIAL

## 2021-08-30 VITALS — WEIGHT: 245 LBS | BODY MASS INDEX: 41.83 KG/M2 | HEIGHT: 64 IN

## 2021-08-30 DIAGNOSIS — Z71.3 NUTRITIONAL COUNSELING: Primary | ICD-10-CM

## 2021-08-30 DIAGNOSIS — E66.01 MORBID OBESITY (H): ICD-10-CM

## 2021-08-30 DIAGNOSIS — E66.01 MORBID OBESITY (H): Primary | ICD-10-CM

## 2021-08-30 PROCEDURE — 99203 OFFICE O/P NEW LOW 30 MIN: CPT | Mod: 95 | Performed by: INTERNAL MEDICINE

## 2021-08-30 PROCEDURE — 99207 PR NO CHARGE LOS: CPT | Performed by: DIETITIAN, REGISTERED

## 2021-08-30 PROCEDURE — 97802 MEDICAL NUTRITION INDIV IN: CPT | Mod: GT | Performed by: DIETITIAN, REGISTERED

## 2021-08-30 ASSESSMENT — MIFFLIN-ST. JEOR: SCORE: 1686.31

## 2021-08-30 ASSESSMENT — PAIN SCALES - GENERAL: PAINLEVEL: NO PAIN (0)

## 2021-08-30 NOTE — PATIENT INSTRUCTIONS
Braden Loera,    It was a pleasure to meet you today.    Follow-up with Registered Dietitian in 1 month if deciding to continue with RD appointments.      Thank you,    YURI Miramontes, RD, LD    To schedule your follow-up appointment please call [phone].     Nutrition Goals  1) Look into intuitive eating.     [email or web address].intuitiveeating.org/10-principles-of-intuitive-eating/    Book by Shereen Easley and Estefany Cortes     Another important part of this that we did not discuss today is mindfulness  Great resource on mindfulness with eating: https://www.fammed.[facility].edu/files/webfm-uploads/documents/outreach/im/handout_mindful_eating.pdf     2) Recommend establishing care with therapist or health . Referral placed for mental health. Information provided on health coaching    3) Trial plate method again (1/2 plate vegetables, 1/4 protein, 1/4 fruit/starchy vegetables/carbohydrates)    REE based on MSJ with no activity accounted for = 1688 kcal/day. 500 kcal deficient for 1 lb weight loss = REE of 1188 kcal/day +/- kcal for daily living (300-500) = ~1500 kcal/day      The Plate Method  http://www.[employer]/184445zm.pdf    Protein Sources   http://[employer]/146090.pdf           Interested in Health Coaching?     Health Coaching-3 Pack:       $99 for three health coaching visits (self pay; insurance does not cover health coaching)    Visits are done via phone at this time    Coaching is a partnership between the  and the client; Coaches do not prescribe or diagnose    Coaching helps inspire the client to reach his/her personal goals   - To schedule your first health  visit, call  [phone]  - To find out more about health coaching, contact Health  Rebeka at [email or web address]

## 2021-08-30 NOTE — NURSING NOTE
"Chief Complaint   Patient presents with     Consult     New mwm.        Vitals:    08/30/21 0958   Weight: 111.1 kg (245 lb)   Height: 1.626 m (5' 4\")       Body mass index is 42.05 kg/m .                          Lani Pete, EMT    "

## 2021-08-30 NOTE — LETTER
2021       RE: Thelma Uribe  [address]  [place] [facility] [address]     Dear Colleague,    Thank you for referring your patient, Thelma Uribe, to the [facility] [place] at [facility]. Please see a copy of my visit note below.    RUBY is a 55 year old who is being evaluated via a billable video visit.      How would you like to obtain your AVS? MyChart  If the video visit is dropped, the invitation should be resent by: Text to cell phone: [phone]  Will anyone else be joining your video visit? No    Video-Visit Details    Type of service:  Video Visit    Video call duration: 30 minutes.  I explained the conditions and plans (more than 50% of time was counseling/coordination of weight management).    Originating Location (pt. Location): Home    Distant Location (provider location):  [facility] [place]     Platform used for Video Visit: [facility] Weight Management Consult    PATIENT:  Thelma Uribe  MRN:         [id number]  :         1965  LUIS:         2021    Dear Tiana Macdonald PA-C,    I had the pleasure of seeing your patient, Thelma Uribe.  Full intake/assessment done to determine barriers to weight loss success and develop a treatment plan.  Thelma Uribe is a 55 year old female interested in treatment of medical problems associated with weight.  Her weight today is 245 lbs 0 oz, Body mass index is 42.05 kg/m ., and she has the following co-morbidities:     2021   I have the following health issues associated with obesity: High Cholesterol, Sleep Apnea, Polycystic Ovarian Syndrome, Infertility, Hypothyroidism   I have the following symptoms associated with obesity: Knee Pain, Infertility (Difficulty Getting Pregnant), Fatigue, Hip Pain     Patient Goals 2021   I am interested in having a healthier  "weight to diminish current health problems: Yes   I am interested in having a healthier weight in order to prevent future health problems: Yes   I am interested in having a healthier weight in order to have a future surgery: Yes   If yes, please indicate which surgery? knee surgery, ankle surgery     Referring Provider 8/24/2021   Please name the provider who referred you to Medical Weight Management.  If you do not know, please answer: \"I Don't Know\". Tiana Macdonald     Wt Readings from Last 4 Encounters:   08/30/21 111.1 kg (245 lb)   04/13/21 111.9 kg (246 lb 9.6 oz)   06/30/20 104.8 kg (231 lb)   05/28/19 108 kg (238 lb)     Weight History 8/24/2021   How concerned are you about your weight? Very Concerned   Would you describe your weight gain as gradual? Yes   I became overweight: In College   The following factors have contributed to my weight gain:  A Health Crisis, Eating Too Much, Genetic (Runs in the Family), Stress   I have tried the following methods to lose weight: Watching Portions or Calories, Exercise, Weight Watchers, Nutrisystem, Slim Fast or Other Liquid Diets   My lowest weight since age 17 was: 135   My highest weight since age 17 was: 243   The most weight I have ever lost was: (lbs) 22   I have the following family history of obesity/being overweight:  One or more of my siblings are overweight   Has anyone in your family had weight loss surgery? No   How has your weight changed over the last year?  Gained   How many pounds? 19     Diet Recall 8/24/2021   Glass juice/day 0   Glass milk/day 0   Glass sugary drink/day 0   How many cans/bottles of sugar pop/soda/tea/sports drinks do you drink in a day? 1   Diet drink/day 1   Alcohol 2-3 TImes a Week   Drinks/day 1 or 2     Eating Habits 8/24/2021   Generally, my meals include foods like these: bread, pasta, rice, potatoes, corn, crackers, sweet dessert, pop, or juice. A Few Times a Week   Generally, my meals include foods like these: fried " meats, brats, burgers, french fries, pizza, cheese, chips, or ice cream. A Few Times a Week   Eat fast food (like McDonalds, [employer], Taco Bell). Less Than Weekly   Eat at a buffet or sit-down restaurant. Never   Eat most of my meals in front of the TV or computer. Less Than Weekly   Often skip meals, eat at random times, have no regular eating times. Less Than Weekly   Rarely sit down for a meal but snack or graze throughout.  Less Than Weekly   Eat extra snacks between meals. A Few Times a Week   Eat most of my food at the end of the day. A Few Times a Week   Eat in the middle of the night or wake up at night to eat. Never   Eat extra snacks to prevent or correct low blood sugar. Once a Week   Eat to prevent acid reflux or stomach pain. Never   Worry about not having enough food to eat. Never   Have you been to the food shelf at least a few times this year? No   I eat when I am depressed. A Few Times a Week   I eat when I am stressed. Almost Everyday   I eat when I am bored. A Few Times a Week   I eat when I am anxious. A Few Times a Week   I eat when I am happy or as a reward. A Few Times a Week   I feel hungry all the time even if I just have eaten. Once a Week   Feeling full is important to me. Less Than Weekly   I finish all the food on my plate even if I am already full. Once a Week   I can't resist eating delicious food or walk past the good food/smell. Once a Week   I eat/snack without noticing that I am eating. A Few Times a Week   I eat when I am preparing the meal. A Few Times a Week   I eat more than usual when I see others eating. A Few Times a Week   I have trouble not eating sweets, ice cream, cookies, or chips if they are around the house. A Few Times a Week   I think about food all day. A Few Times a Week   What foods, if any, do you crave? Sweets/Candy/Chocolate     Activity/Exercise History 8/24/2021   How much of a typical 12 hour day do you spend sitting? Most of the Day   How much of a  typical 12 hour day do you spend lying down? Less Than Half the Day   How much of a typical day do you spend walking/standing? Less Than Half the Day   How many hours (not including work) do you spend on the TV/Video Games/Computer/Tablet/Phone? 2-3 Hours   How many times a week are you active for the purpose of exercise? 2-3 Times a Week   What keeps you from being more active? Pain   How many total minutes do you spend doing some activity for the purpose of exercising when you exercise? More Than 30 Minutes     PAST MEDICAL HISTORY:  Past Medical History:   Diagnosis Date     Dysthymic disorder     much improved, took celexa x 1mo in '06     Elevated blood pressure reading without diagnosis of hypertension     in '10, much improved with diet/exercise     Exercise induced bronchospasm     takes albuterol very rarely     Female infertility of other specified origin     stopped trying     GERD (gastroesophageal reflux disease)     prilosec for awhile, stable off for yrs (2/16)     Thyroid disease      Work/Social History Reviewed With Patient 8/24/2021   My employment status is: Full-Time   My job is: physician assistant   How much of your job is spent on the computer or phone? 50%   How many hours do you spend commuting to work daily?  2.0   What is your marital status? /In a Relationship   If in a relationship, is your significant other overweight? No   Do you have children? No   If you have children, are they overweight? N/A   Who do you live with?  Mayda, my wife   Are they supportive of your health goals? Yes   Who does the food shopping?  we both do     Mental Health History Reviewed With Patient 8/24/2021   Have you ever been physically or sexually abused? No   If yes, do you feel that the abuse is affecting your weight? No   If yes, would you like to talk to a counselor about the abuse? N/A   How often in the past 2 weeks have you felt little interest or pleasure in doing things? Not at all   Over the  "past 2 weeks how often have you felt down, depressed, or hopeless? Not at all       Sleep History Reviewed With Patient 8/24/2021   How many hours do you sleep at night? 8   Do you think that you snore loudly or has anybody ever heard you snore loudly (louder than talking or so loud it can be heard behind a shut door)? Yes   Has anyone seen or heard you stop breathing during your sleep? Yes   Do you often feel tired, fatigued, or sleepy during the day? No   Do you have a TV/Computer in your bedroom? No     MEDICATIONS:   Current Outpatient Medications   Medication Sig Dispense Refill     levothyroxine (SYNTHROID/LEVOTHROID) 112 MCG tablet Take 1 tablet (112 mcg) by mouth daily 90 tablet 3     Multiple Vitamin (THERAPEUTIC MULTIVITAMIN PO)        ALLERGIES:   Allergies   Allergen Reactions     Hydrocodone-Acetaminophen Hives, Itching and Nausea and Vomiting     Oxycodone Hives, Itching, Nausea and Vomiting and Rash     PHYSICAL EXAM:  Ht 1.626 m (5' 4\")   Wt 111.1 kg (245 lb)   LMP 09/10/2012 (Exact Date)   BMI 42.05 kg/m     A & O x 3  HEENT: NCAT, mucous membranes moist  Respirations unlabored  Location of obesity: Mixed Obesity    ASSESSMENT:  Thelma is a patient with mature onset morbid obesity with significant element of familial/genetic influence and with current health consequences. She does need aggressive weight loss plan due to High Cholesterol, Sleep Apnea, Polycystic Ovarian Syndrome, Infertility, Hypothyroidism.  Weight was up to 237 a few years ago, lost to ~220 until accident 2019, now gaining weight with lack of exercise.     Thelma Uribe eats a high carb diet and uses food as mood management. Has been working with  and hired nutrition advice: low carb, gluten free, more greens, no alcohol. Now since accident is \"stress eating\".    Her problem is complicated by strong craving/reward pathways    Her ability to lose weight is impacted by physical impairment and lack of " "confidence.    PLAN:    Volumetrics eating plan  Meal planning    Craving/Reward   Ancillary testing:  N/A.  Food Plan:  Volumetrics and High protein/low carbohydrate.   Activity Plan:  Activity journal.  Supplementary:  N/A.   Medication:  The patient will begin medication in pursuit of improved medical status as influenced by body weight. She will start Qsymia.  There is a mutual understanding of the goals and risks of this therapy. The patient is in agreement. She is educated on dosage regimen and possible side effects.    RTC:    12 weeks.  Video call duration: 30 minutes.  I explained the conditions and plans (more than 50% of time was counseling/coordination of weight management).    Sincerely,  Brody Graham MD    The patient was evaluated via a billable video visit and notified \"This visit will be via video call between you and your physician. If lab work is needed we can place an order and you can later stop by the lab. Video visits are billed at different rates depending on your insurance coverage.  Please reach out to your insurance provider with any questions. If the physician feels a video visit is not appropriate, you will not be charged for this service.\" Patient gave verbal consent for Video visit and would you like to obtain AVS by [employer].      "

## 2021-08-30 NOTE — PROGRESS NOTES
"    New Medical Weight Management Consult    PATIENT:  Thelma Uribe  MRN:         [id number]  :         1965  LUIS:         2021    Dear Tiana Macdonald PA-C,    I had the pleasure of seeing your patient, Thelma Uribe.  Full intake/assessment done to determine barriers to weight loss success and develop a treatment plan.  Thelma Uribe is a 55 year old female interested in treatment of medical problems associated with weight.  Her weight today is 245 lbs 0 oz, Body mass index is 42.05 kg/m ., and she has the following co-morbidities:     2021   I have the following health issues associated with obesity: High Cholesterol, Sleep Apnea, Polycystic Ovarian Syndrome, Infertility, Hypothyroidism   I have the following symptoms associated with obesity: Knee Pain, Infertility (Difficulty Getting Pregnant), Fatigue, Hip Pain     Patient Goals 2021   I am interested in having a healthier weight to diminish current health problems: Yes   I am interested in having a healthier weight in order to prevent future health problems: Yes   I am interested in having a healthier weight in order to have a future surgery: Yes   If yes, please indicate which surgery? knee surgery, ankle surgery     Referring Provider 2021   Please name the provider who referred you to Medical Weight Management.  If you do not know, please answer: \"I Don't Know\". Tiana Macdonald     Wt Readings from Last 4 Encounters:   21 111.1 kg (245 lb)   21 111.9 kg (246 lb 9.6 oz)   20 104.8 kg (231 lb)   19 108 kg (238 lb)     Weight History 2021   How concerned are you about your weight? Very Concerned   Would you describe your weight gain as gradual? Yes   I became overweight: In College   The following factors have contributed to my weight gain:  A Health Crisis, Eating Too Much, Genetic (Runs in the Family), Stress   I have tried the following methods to lose weight: Watching " Portions or Calories, Exercise, Weight Watchers, Nutrisystem, Slim Fast or Other Liquid Diets   My lowest weight since age 17 was: 135   My highest weight since age 17 was: 243   The most weight I have ever lost was: (lbs) 22   I have the following family history of obesity/being overweight:  One or more of my siblings are overweight   Has anyone in your family had weight loss surgery? No   How has your weight changed over the last year?  Gained   How many pounds? 19     Diet Recall 8/24/2021   Glass juice/day 0   Glass milk/day 0   Glass sugary drink/day 0   How many cans/bottles of sugar pop/soda/tea/sports drinks do you drink in a day? 1   Diet drink/day 1   Alcohol 2-3 TImes a Week   Drinks/day 1 or 2     Eating Habits 8/24/2021   Generally, my meals include foods like these: bread, pasta, rice, potatoes, corn, crackers, sweet dessert, pop, or juice. A Few Times a Week   Generally, my meals include foods like these: fried meats, brats, burgers, french fries, pizza, cheese, chips, or ice cream. A Few Times a Week   Eat fast food (like McDonalds, [employer], Taco Bell). Less Than Weekly   Eat at a buffet or sit-down restaurant. Never   Eat most of my meals in front of the TV or computer. Less Than Weekly   Often skip meals, eat at random times, have no regular eating times. Less Than Weekly   Rarely sit down for a meal but snack or graze throughout.  Less Than Weekly   Eat extra snacks between meals. A Few Times a Week   Eat most of my food at the end of the day. A Few Times a Week   Eat in the middle of the night or wake up at night to eat. Never   Eat extra snacks to prevent or correct low blood sugar. Once a Week   Eat to prevent acid reflux or stomach pain. Never   Worry about not having enough food to eat. Never   Have you been to the food shelf at least a few times this year? No   I eat when I am depressed. A Few Times a Week   I eat when I am stressed. Almost Everyday   I eat when I am bored. A Few Times a  Week   I eat when I am anxious. A Few Times a Week   I eat when I am happy or as a reward. A Few Times a Week   I feel hungry all the time even if I just have eaten. Once a Week   Feeling full is important to me. Less Than Weekly   I finish all the food on my plate even if I am already full. Once a Week   I can't resist eating delicious food or walk past the good food/smell. Once a Week   I eat/snack without noticing that I am eating. A Few Times a Week   I eat when I am preparing the meal. A Few Times a Week   I eat more than usual when I see others eating. A Few Times a Week   I have trouble not eating sweets, ice cream, cookies, or chips if they are around the house. A Few Times a Week   I think about food all day. A Few Times a Week   What foods, if any, do you crave? Sweets/Candy/Chocolate     Activity/Exercise History 8/24/2021   How much of a typical 12 hour day do you spend sitting? Most of the Day   How much of a typical 12 hour day do you spend lying down? Less Than Half the Day   How much of a typical day do you spend walking/standing? Less Than Half the Day   How many hours (not including work) do you spend on the TV/Video Games/Computer/Tablet/Phone? 2-3 Hours   How many times a week are you active for the purpose of exercise? 2-3 Times a Week   What keeps you from being more active? Pain   How many total minutes do you spend doing some activity for the purpose of exercising when you exercise? More Than 30 Minutes     PAST MEDICAL HISTORY:  Past Medical History:   Diagnosis Date     Dysthymic disorder     much improved, took celexa x 1mo in '06     Elevated blood pressure reading without diagnosis of hypertension     in '10, much improved with diet/exercise     Exercise induced bronchospasm     takes albuterol very rarely     Female infertility of other specified origin     stopped trying     GERD (gastroesophageal reflux disease)     prilosec for awhile, stable off for yrs (2/16)     Thyroid disease   "    Work/Social History Reviewed With Patient 8/24/2021   My employment status is: Full-Time   My job is: physician assistant   How much of your job is spent on the computer or phone? 50%   How many hours do you spend commuting to work daily?  2.0   What is your marital status? /In a Relationship   If in a relationship, is your significant other overweight? No   Do you have children? No   If you have children, are they overweight? N/A   Who do you live with?  Mayda, my wife   Are they supportive of your health goals? Yes   Who does the food shopping?  we both do     Mental Health History Reviewed With Patient 8/24/2021   Have you ever been physically or sexually abused? No   If yes, do you feel that the abuse is affecting your weight? No   If yes, would you like to talk to a counselor about the abuse? N/A   How often in the past 2 weeks have you felt little interest or pleasure in doing things? Not at all   Over the past 2 weeks how often have you felt down, depressed, or hopeless? Not at all       Sleep History Reviewed With Patient 8/24/2021   How many hours do you sleep at night? 8   Do you think that you snore loudly or has anybody ever heard you snore loudly (louder than talking or so loud it can be heard behind a shut door)? Yes   Has anyone seen or heard you stop breathing during your sleep? Yes   Do you often feel tired, fatigued, or sleepy during the day? No   Do you have a TV/Computer in your bedroom? No     MEDICATIONS:   Current Outpatient Medications   Medication Sig Dispense Refill     levothyroxine (SYNTHROID/LEVOTHROID) 112 MCG tablet Take 1 tablet (112 mcg) by mouth daily 90 tablet 3     Multiple Vitamin (THERAPEUTIC MULTIVITAMIN PO)        ALLERGIES:   Allergies   Allergen Reactions     Hydrocodone-Acetaminophen Hives, Itching and Nausea and Vomiting     Oxycodone Hives, Itching, Nausea and Vomiting and Rash     PHYSICAL EXAM:  Ht 1.626 m (5' 4\")   Wt 111.1 kg (245 lb)   LMP 09/10/2012 " "(Exact Date)   BMI 42.05 kg/m     A & O x 3  HEENT: NCAT, mucous membranes moist  Respirations unlabored  Location of obesity: Mixed Obesity    ASSESSMENT:  Thelma is a patient with mature onset morbid obesity with significant element of familial/genetic influence and with current health consequences. She does need aggressive weight loss plan due to High Cholesterol, Sleep Apnea, Polycystic Ovarian Syndrome, Infertility, Hypothyroidism.  Weight was up to 237 a few years ago, lost to ~220 until accident 2019, now gaining weight with lack of exercise.     Thelma Uribe eats a high carb diet and uses food as mood management. Has been working with  and hired nutrition advice: low carb, gluten free, more greens, no alcohol. Now since accident is \"stress eating\".    Her problem is complicated by strong craving/reward pathways    Her ability to lose weight is impacted by physical impairment and lack of confidence.    PLAN:    Volumetrics eating plan  Meal planning    Craving/Reward   Ancillary testing:  N/A.  Food Plan:  Volumetrics and High protein/low carbohydrate.   Activity Plan:  Activity journal.  Supplementary:  N/A.   Medication:  The patient will begin medication in pursuit of improved medical status as influenced by body weight. She will start Qsymia.  There is a mutual understanding of the goals and risks of this therapy. The patient is in agreement. She is educated on dosage regimen and possible side effects.    RTC:    12 weeks.  Video call duration: 30 minutes.  I explained the conditions and plans (more than 50% of time was counseling/coordination of weight management).    Sincerely,  Brody Graham MD    The patient was evaluated via a billable video visit and notified \"This visit will be via video call between you and your physician. If lab work is needed we can place an order and you can later stop by the lab. Video visits are billed at different rates depending on your " "insurance coverage.  Please reach out to your insurance provider with any questions. If the physician feels a video visit is not appropriate, you will not be charged for this service.\" Patient gave verbal consent for Video visit and would you like to obtain AVS by [employer].      "

## 2021-08-30 NOTE — LETTER
"8/30/2021       RE: Thelma Uribe  [address]  [place] [address]     Dear Colleague,    Thank you for referring your patient, Thelma Uribe, to the [facility] [place] at [facility]. Please see a copy of my visit note below.    Thelma Uribe is a 55 year old female who is being evaluated via a billable video visit.      The patient has been notified of following:     \"This video visit will be conducted via a call between you and your physician/provider. We have found that certain health care needs can be provided without the need for an in-person physical exam.  This service lets us provide the care you need with a video conversation.  If a prescription is necessary we can send it directly to your pharmacy.  If lab work is needed we can place an order for that and you can then stop by our lab to have the test done at a later time.    Video visits are billed at different rates depending on your insurance coverage.  Please reach out to your insurance provider with any questions.    If during the course of the call the physician/provider feels a video visit is not appropriate, you will not be charged for this service.\"    How would you like to obtain your AVS? MyChart  If the video visit is dropped, the invitation should be resent by: Text to cell phone: [phone]  Will anyone else be joining your video visit? No         Video-Visit Details    Type of service:  Video Visit    Video Start Time: 11:06 AM  Video End Time: 11:36 AM    Originating Location (pt. Location): Home     Distant Location (provider location):  [facility] [place]      Platform used for Video Visit: [employer]    During this virtual visit the patient is located in [place], patient verifies this as the location during the entirety of this visit.     New Weight Management Nutrition Consultation    Thelma" "Jojo is a 55 year old female presents today for new weight management nutrition consultation.      Patient referred by Dr. Graham on August 30, 2021.    Patient with Co-morbidities of obesity including:  Type II DM no  Renal Failure no  Sleep apnea yes   Hypertension no   Dyslipidemia yes, high cholesterol  Joint pain yes  Back pain no  GERD yes    PMH: PCOS, infertility, hypothyroidism, fatigue, hip and knee pain    Pt interested in weight loss in preparation for knee and ankle surgery     Anthropometrics:  Estimated body mass index is 42.05 kg/m  as calculated from the following:    Height as of an earlier encounter on 8/30/21: 1.626 m (5' 4\").    Weight as of an earlier encounter on 8/30/21: 111.1 kg (245 lb).    Medications for Weight Loss:  Phentermine-Topiramate (Qysmia)     NUTRITION HISTORY  See MD note for details.    Worked with nutritionist in 2019 - recommended by . Pushed supplements and supplement testing. Did like nutrition part of it - did elimination diet to determine allergies.  Never got to add back in phase - had bad accident. Fell off a ladder - bed ridden 3 months.     Was doing 1/2 plate greens, protein  Got away from carbs, dairy, alcohol   Wasn't the best way to live - was really strict with but lost weight.   All or nothing person - off of eating plan currrently    Goals per pt: work on implementing an eating plan, discussion on dietary recommendations     Typical day  Breakfast: yogurt,  fruit overnight oats  Lunch: Salad, chicken, hard boiled eggs, veggies  Dinner: chicken, salads, potatoes, meats  Snacks: candy, chocolate malts    Not a fan of logging food. Discussed plate method and intuitive eating.     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 Broke both legs, long recovery. I have permanent limits to mobility. I am concerned about my weight and how it will affect me long term. I " "need to get this weight off my joints.\"    Physical Activity:  Limited, fell of ladder in Dec 2019, broke both legs. Mobility is limited.    Nutrition Prescription  Recommended energy/nutrient modification.    REE based on MSJ with no activity accounted for = 1688 kcal/day. 500 kcal deficient for 1 lb weight loss = REE of 1188 kcal/day +/- kcal for daily living (300-500) = ~1500 kcal/day    Discussed margin of error and pt aware that indirect caliometry is most accurate method for calculating estimated needs.     Nutrition Diagnosis  Obesity r/t self-monitoring deficit and excessive energy intake aeb BMI >30.    Nutrition Intervention  Reviewed current dietary habits and pts history   Discussed long-term goals pt hopes to accomplish in RD appointments  Answered pt questions  Coordination of care - pt recommended to follow up with Dr. Graham in 12 weeks, Lauren Bloch in 1 month.   Nutrition education   REE calculation completed and discussed   AVS and handouts via [employer]    Patient demonstrates understanding.    Expected Engagement:     Nutrition Goals  1) Look into intuitive eating.     https://www.intuitiveeating.org/10-principles-of-intuitive-eating/    Book by Shereen Easley and Estefany Cortes     Another important part of this that we did not discuss today is mindfulness  Great resource on mindfulness with eating: https://www.fammed.[facility]edu/files/webfm-uploads/documents/outreach/im/handout_mindful_eating.pdf     2) Recommend establishing care with therapist or health . Referral placed for mental health. Information provided on health coaching    3) Trial plate method again (1/2 plate vegetables, 1/4 protein, 1/4 fruit/starchy vegetables/carbohydrates)    REE based on MSJ with no activity accounted for = 1688 kcal/day. 500 kcal deficient for 1 lb weight loss = REE of 1188 kcal/day +/- kcal for daily living (300-500) = ~1500 kcal/day      The Plate Method  http://www."[employer]/513921sh.pdf    Protein " Sources   http://Yahoo!/267410.pdf     Follow-Up:  PRN, 1 month recommended     Time spent with patient: 30 minutes.  YURI Coyne, RD, LD

## 2021-08-30 NOTE — PROGRESS NOTES
RUBY is a 55 year old who is being evaluated via a billable video visit.      How would you like to obtain your AVS? MyChart  If the video visit is dropped, the invitation should be resent by: Text to cell phone: [phone]  Will anyone else be joining your video visit? No    Video-Visit Details    Type of service:  Video Visit    Video call duration: 30 minutes.  I explained the conditions and plans (more than 50% of time was counseling/coordination of weight management).    Originating Location (pt. Location): Home    Distant Location (provider location):  [facility] [place]     Platform used for Video Visit: expressor software

## 2021-08-30 NOTE — PROGRESS NOTES
"Thelma Uribe is a 55 year old female who is being evaluated via a billable video visit.      The patient has been notified of following:     \"This video visit will be conducted via a call between you and your physician/provider. We have found that certain health care needs can be provided without the need for an in-person physical exam.  This service lets us provide the care you need with a video conversation.  If a prescription is necessary we can send it directly to your pharmacy.  If lab work is needed we can place an order for that and you can then stop by our lab to have the test done at a later time.    Video visits are billed at different rates depending on your insurance coverage.  Please reach out to your insurance provider with any questions.    If during the course of the call the physician/provider feels a video visit is not appropriate, you will not be charged for this service.\"    How would you like to obtain your AVS? MyChart  If the video visit is dropped, the invitation should be resent by: Text to cell phone: [phone]  Will anyone else be joining your video visit? No         Video-Visit Details    Type of service:  Video Visit    Video Start Time: 11:06 AM  Video End Time: 11:36 AM    Originating Location (pt. Location): Home     Distant Location (provider location):  [facility] [place]      Platform used for Video Visit: FONU2    During this virtual visit the patient is located in [place], patient verifies this as the location during the entirety of this visit.     New Weight Management Nutrition Consultation    Thelma Uribe is a 55 year old female presents today for new weight management nutrition consultation.      Patient referred by Dr. Graham on August 30, 2021.    Patient with Co-morbidities of obesity including:  Type II DM no  Renal Failure no  Sleep apnea yes   Hypertension no   Dyslipidemia yes, high cholesterol  Joint pain yes  Back " "pain no  GERD yes    PMH: PCOS, infertility, hypothyroidism, fatigue, hip and knee pain    Pt interested in weight loss in preparation for knee and ankle surgery     Anthropometrics:  Estimated body mass index is 42.05 kg/m  as calculated from the following:    Height as of an earlier encounter on 8/30/21: 1.626 m (5' 4\").    Weight as of an earlier encounter on 8/30/21: 111.1 kg (245 lb).    Medications for Weight Loss:  Phentermine-Topiramate (Qysmia)     NUTRITION HISTORY  See MD note for details.    Worked with nutritionist in 2019 - recommended by . Pushed supplements and supplement testing. Did like nutrition part of it - did elimination diet to determine allergies.  Never got to add back in phase - had bad accident. Fell off a ladder - bed ridden 3 months.     Was doing 1/2 plate greens, protein  Got away from carbs, dairy, alcohol   Wasn't the best way to live - was really strict with but lost weight.   All or nothing person - off of eating plan currrently    Goals per pt: work on implementing an eating plan, discussion on dietary recommendations     Typical day  Breakfast: yogurt,  fruit overnight oats  Lunch: Salad, chicken, hard boiled eggs, veggies  Dinner: chicken, salads, potatoes, meats  Snacks: candy, chocolate malts    Not a fan of logging food. Discussed plate method and intuitive eating.     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 Broke both legs, long recovery. I have permanent limits to mobility. I am concerned about my weight and how it will affect me long term. I need to get this weight off my joints.\"    Physical Activity:  Limited, fell of ladder in Dec 2019, broke both legs. Mobility is limited.    Nutrition Prescription  Recommended energy/nutrient modification.    REE based on MSJ with no activity accounted for = 1688 kcal/day. 500 kcal deficient for 1 lb weight loss = REE of 1188 kcal/day +/- " kcal for daily living (300-500) = ~1500 kcal/day    Discussed margin of error and pt aware that indirect caliometry is most accurate method for calculating estimated needs.     Nutrition Diagnosis  Obesity r/t self-monitoring deficit and excessive energy intake aeb BMI >30.    Nutrition Intervention  Reviewed current dietary habits and pts history   Discussed long-term goals pt hopes to accomplish in RD appointments  Answered pt questions  Coordination of care - pt recommended to follow up with Dr. Graham in 12 weeks, Lauren Bloch in 1 month.   Nutrition education   REE calculation completed and discussed   AVS and handouts via [employer]    Patient demonstrates understanding.    Expected Engagement:     Nutrition Goals  1) Look into intuitive eating.     https://www.intuitiveeating.org/10-principles-of-intuitive-eating/    Book by Shereen Easley and Estefany Cortes     Another important part of this that we did not discuss today is mindfulness  Great resource on mindfulness with eating: https://www.[facility].edu/files/webfm-uploads/documents/outreach/im/handout_mindful_eating.pdf     2) Recommend establishing care with therapist or health . Referral placed for mental health. Information provided on health coaching    3) Trial plate method again (1/2 plate vegetables, 1/4 protein, 1/4 fruit/starchy vegetables/carbohydrates)    REE based on MSJ with no activity accounted for = 1688 kcal/day. 500 kcal deficient for 1 lb weight loss = REE of 1188 kcal/day +/- kcal for daily living (300-500) = ~1500 kcal/day      The Plate Method  [email or web address]    Protein Sources   [email or web address]     Follow-Up:  PRN, 1 month recommended     Time spent with patient: 30 minutes.  Risa Holguin, YURI, RD, LD

## 2021-09-05 ENCOUNTER — HEALTH MAINTENANCE LETTER (OUTPATIENT)
Age: 56
End: 2021-09-05

## 2021-10-13 ENCOUNTER — IMMUNIZATION (OUTPATIENT)
Dept: FAMILY MEDICINE | Facility: CLINIC | Age: 56
End: 2021-10-13
Payer: COMMERCIAL

## 2021-10-13 DIAGNOSIS — Z23 NEED FOR VACCINATION: ICD-10-CM

## 2021-10-13 DIAGNOSIS — Z23 NEED FOR PROPHYLACTIC VACCINATION AND INOCULATION AGAINST INFLUENZA: Primary | ICD-10-CM

## 2021-10-13 PROCEDURE — 90682 RIV4 VACC RECOMBINANT DNA IM: CPT

## 2021-10-13 PROCEDURE — 99207 PR NO CHARGE NURSE ONLY: CPT

## 2021-10-13 PROCEDURE — 90715 TDAP VACCINE 7 YRS/> IM: CPT

## 2021-10-13 PROCEDURE — 90472 IMMUNIZATION ADMIN EACH ADD: CPT

## 2021-10-13 PROCEDURE — 90471 IMMUNIZATION ADMIN: CPT

## 2021-10-13 NOTE — NURSING NOTE
Prior to immunization administration, verified patients identity using patient s name and date of birth. Please see Immunization Activity for additional information.     Screening Questionnaire for Adult Immunization    Are you sick today?   No   Do you have allergies to medications, food, a vaccine component or latex?   yes   Have you ever had a serious reaction after receiving a vaccination?   No   Do you have a long-term health problem with heart, lung, kidney, or metabolic disease (e.g., diabetes), asthma, a blood disorder, no spleen, complement component deficiency, a cochlear implant, or a spinal fluid leak?  Are you on long-term aspirin therapy?   No   Do you have cancer, leukemia, HIV/AIDS, or any other immune system problem?   No   Do you have a parent, brother, or sister with an immune system problem?   No   In the past 3 months, have you taken medications that affect  your immune system, such as prednisone, other steroids, or anticancer drugs; drugs for the treatment of rheumatoid arthritis, Crohn s disease, or psoriasis; or have you had radiation treatments?   No   Have you had a seizure, or a brain or other nervous system problem?   No   During the past year, have you received a transfusion of blood or blood    products, or been given immune (gamma) globulin or antiviral drug?   No   For women: Are you pregnant or is there a chance you could become       pregnant during the next month?   No   Have you received any vaccinations in the past 4 weeks?   No     Immunization questionnaire answers were all negative.        Per orders of sumi Macdonald, injection of TDAP and influenza given by Shraddha Bradley. Patient instructed to remain in clinic for 15 minutes afterwards, and to report any adverse reaction to me immediately.       Screening performed by Shraddha Bradley on 10/13/2021 at 9:54 AM.

## 2021-10-25 ENCOUNTER — VIRTUAL VISIT (OUTPATIENT)
Dept: PHARMACY | Facility: CLINIC | Age: 56
End: 2021-10-25
Attending: INTERNAL MEDICINE
Payer: COMMERCIAL

## 2021-10-25 DIAGNOSIS — E66.01 CLASS 3 SEVERE OBESITY DUE TO EXCESS CALORIES WITH BODY MASS INDEX (BMI) OF 40.0 TO 44.9 IN ADULT, UNSPECIFIED WHETHER SERIOUS COMORBIDITY PRESENT (H): Primary | ICD-10-CM

## 2021-10-25 DIAGNOSIS — E03.9 HYPOTHYROIDISM, UNSPECIFIED TYPE: ICD-10-CM

## 2021-10-25 DIAGNOSIS — Z78.9 TAKES DIETARY SUPPLEMENTS: ICD-10-CM

## 2021-10-25 DIAGNOSIS — E66.813 CLASS 3 SEVERE OBESITY DUE TO EXCESS CALORIES WITH BODY MASS INDEX (BMI) OF 40.0 TO 44.9 IN ADULT, UNSPECIFIED WHETHER SERIOUS COMORBIDITY PRESENT (H): Primary | ICD-10-CM

## 2021-10-25 DIAGNOSIS — R52 GENERALIZED PAIN: ICD-10-CM

## 2021-10-25 PROCEDURE — 99605 MTMS BY PHARM NP 15 MIN: CPT | Performed by: PHARMACIST

## 2021-10-25 PROCEDURE — 99607 MTMS BY PHARM ADDL 15 MIN: CPT | Performed by: PHARMACIST

## 2021-10-25 RX ORDER — NAPROXEN SODIUM 220 MG
220 TABLET ORAL 2 TIMES DAILY PRN
COMMUNITY

## 2021-10-25 NOTE — PROGRESS NOTES
Medication Therapy Management (MTM) Encounter    ASSESSMENT:                            Medication Adherence/Access: No issues identified    Obesity: Progressing.  Due to side effects improving over time, reasonable to continue Qsymia at this time.  Discussed patient continues MiraLAX 1 capful (17 g) daily as needed for constipation.  Discussed it may be beneficial to follow-up with sleep medicine due to issues of CPAP machine and mask that she is having to ensure calibrated appropriately and mask is fitting appropriately as this could be having a significant impact on her sleep as well.    Hypothyroidism: Stable.  TSH is within normal limits.    Supplements: Stable.    General Pain: Stable.    PLAN:                            1.  Continue current Qsymia dose at this time.    2.  Use MiraLAX 1 capful (17 g) once daily as needed for constipation.    3.  Patient to follow-up with sleep medicine regarding CPAP mask and machine.    Follow-up: Return in about 6 weeks (around 12/6/2021) for Medication Therapy Management Pharmacist Visit, (scheduled today).    Future: blood pressure check?     SUBJECTIVE/OBJECTIVE:                          Thelma Uribe is a 55 year old female called for an initial visit. She was referred to me from Dr. Graham.      Reason for visit: Qsymia follow up.    Allergies/ADRs: Reviewed in chart  Past Medical History: Reviewed in chart  Tobacco: She reports that she has never smoked. She has never used smokeless tobacco.  Alcohol: not currently using  Caffeine: 2 caffeine beverages per day.     Medication Adherence/Access: no issues reported    Obesity:   Qsymia (phentermine/topiramate) 7.5-46 mg once daily in AM     Followed by Dr. Graham seen 8/30/2021 for [facility].  Prescribed Qsymia at that time, started around 2 weeks after it was prescribed.  Patient is experiencing the follow side effects: dry mouth, insomnia and constipation. Tried magnesium, did find  "greater issues of diarrhea. She isn't having bowel movement as frequently, no straining when having a bowel movement. Bowel movement every 2-3 days. She reports that she can fall asleep, but reports waking up at 2 am and can't get back to sleep. Reports that it is sporadic that the sleep issues are occurring. If watching something on TV that is more stimulating she will notice these sleep issues. Now she is trying to improve with sleep hygiene, this is improving sleep. She does use a CPAP, noticing mask might be a little looser and she is almost hyperventilating. Reports CPAP settings are manual, hasn't followed up with Sleep Medicine in a while. Patient reports now has no interest in alcohol in the Qsymia.  She is typically exercising in the form of walking 2-3 times a week for about 30 minutes or more at a time. Drinking ~64 oz water daily.  Weight History: Patient reports she is concerned about her weight.  She became overweight in college.  She finds that several factors for weight contributed to her weight gain including eating too much it being genetic, stress.  She has tried watching portions or calories, exercise, weight watchers, Nutrisystem, Slim fast and other liquid diets.  Her highest weight in life is her current weight.  She reports that she has gained approximately 19 pounds over the last year.    Current weight today: 238 lb   Initial Consult Weight 8/30/2021: 245 lb   Cumulative Weight Loss: -7 lb, -2.9% from baseline   Wt Readings from Last 4 Encounters:   08/30/21 245 lb (111.1 kg)   04/13/21 246 lb 9.6 oz (111.9 kg)   06/30/20 231 lb (104.8 kg)   05/28/19 238 lb (108 kg)     Estimated body mass index is 42.05 kg/m  as calculated from the following:    Height as of 8/30/21: 5' 4\" (1.626 m).    Weight as of 8/30/21: 245 lb (111.1 kg).    BP Readings from Last 3 Encounters:   04/13/21 112/80   06/30/20 111/75   02/07/20 114/81     Hypothyroidism:   Levothyroxine 112 mcg daily.     Patient is having " the following symptoms: none.  Patient had thyroidectomy May 2012.  Has been on levothyroxine since then.  TSH   Date Value Ref Range Status   04/13/2021 1.16 0.40 - 4.00 mU/L Final     Supplements:   Multivitamin 1 tablet daily     No concerns.     General Pain:   Naproxen 220 mg twice daily as needed     No history of ulcer or gastrointestinal bleed. Effective when needed. Will use for aches and pains, doesn't use often.     ----------------      I spent 30 minutes with this patient today. A copy of the visit note was provided to the patient's referring provider.    The patient was sent via [employer] a summary of these recommendations.     Lauren Bloch, PharmD  Medication Therapy Management Pharmacist   [facility]    Telemedicine Visit Details  Type of service:  Telephone visit  Start Time: 10:02 AM  End Time: 10:32 AM<  Originating Location (patient location): Home  Distant Location (provider location):  [facility]     Medication Therapy Recommendations  Class 3 severe obesity due to excess calories with body mass index (BMI) of 40.0 to 44.9 in adult, unspecified whether serious comorbidity present (H)    Current Medication: Phentermine-Topiramate 7.5-46 MG CP24   Rationale: Undesirable effect - Adverse medication event - Safety   Recommendation: Start Medication - MiraLax 17 GM/SCOOP Powd - 1 capful daily as needed   Status: Accepted - no CPA Needed

## 2021-10-25 NOTE — Clinical Note
MWM - Qsymia start 6 weeks ago, lost ~2.9% body weight from baseline, had some side effects but improving. Think some is related to CPAP settings and mask fitting potentially as well. She will follow upw ith sleep medicine. I follow up with her in 6 weeks and you 4-6 weeks thereafter.

## 2021-12-06 ENCOUNTER — VIRTUAL VISIT (OUTPATIENT)
Dept: PHARMACY | Facility: CLINIC | Age: 56
End: 2021-12-06
Payer: COMMERCIAL

## 2021-12-06 DIAGNOSIS — E66.01 CLASS 3 SEVERE OBESITY DUE TO EXCESS CALORIES WITH BODY MASS INDEX (BMI) OF 40.0 TO 44.9 IN ADULT, UNSPECIFIED WHETHER SERIOUS COMORBIDITY PRESENT (H): Primary | ICD-10-CM

## 2021-12-06 DIAGNOSIS — E66.813 CLASS 3 SEVERE OBESITY DUE TO EXCESS CALORIES WITH BODY MASS INDEX (BMI) OF 40.0 TO 44.9 IN ADULT, UNSPECIFIED WHETHER SERIOUS COMORBIDITY PRESENT (H): Primary | ICD-10-CM

## 2021-12-06 PROCEDURE — 99606 MTMS BY PHARM EST 15 MIN: CPT | Performed by: PHARMACIST

## 2021-12-06 NOTE — PROGRESS NOTES
Medication Therapy Management (MTM) Encounter    ASSESSMENT:                            Medication Adherence/Access: No issues identified    Obesity: Needs improvement. May benefit from alternative to Qsymia due to unmanageable side effects. Could consider alternative such as Wegovy. If injectable weight loss medication(s) like Saxenda or Wegovy not covered, then can consider topiramate off label.      PLAN:                            1. Consider stopping Qsymia and starting alternative such as Wegovy.     Follow-up: 6 weeks with Dr. Graham and 3 month with Lauren Bloch, MT pharmacist [phone] to schedule.     SUBJECTIVE/OBJECTIVE:                          Thelma Uribe is a 56 year old female called for a follow-up visit. She was referred to me from Dr. Graham.  Today's visit is a follow-up MTM visit from 10/25/2021.      Reason for visit: Qsymia - issues tolerating, would like to discuss alterantive such as injectable.    Allergies/ADRs: Reviewed in chart  Past Medical History: Reviewed in chart  Tobacco: She reports that she has never smoked. She has never used smokeless tobacco.  Alcohol: not currently using    Medication Adherence/Access: no issues reported    Obesity:   Qsymia (phentermine/topiramate) 7.5-46 mg once daily in AM      Followed by Dr. Graham seen 8/30/2021 for [facility].  Prescribed Qsymia at that time, started around 2 weeks after it was prescribed.  Patient is experiencing the follow side effects: dry mouth, insomnia and constipation. She is finding that she is continuing to have issues of insomnia. She thinks that she feels more agitated on the regimen. She would like to try something else, thinks maybe the Wegovy may be better option.  Drinking ~64 oz water daily. No pancreatitis. No MTC or MEN2. She reports that she did find helpful for appetite and thinking about food less. She reports it has been difficult to make meaningful change with nutrition  "due to how busy she has been and the holidays but thinks she has progressed.   Weight History: Patient reports she is concerned about her weight.  She became overweight in college.  She finds that several factors for weight contributed to her weight gain including eating too much it being genetic, stress.  She has tried watching portions or calories, exercise, weight watchers, Nutrisystem, Slim fast and other liquid diets.  Her highest weight in life is her current weight.  She reports that she has gained approximately 19 pounds over the last year.     Current weight today: 235 lb   Initial Consult Weight 8/30/2021: 245 lb   Cumulative Weight Loss: -10 lb, -4.1% from baseline     Wt Readings from Last 4 Encounters:   08/30/21 245 lb (111.1 kg)   04/13/21 246 lb 9.6 oz (111.9 kg)   06/30/20 231 lb (104.8 kg)   05/28/19 238 lb (108 kg)     Estimated body mass index is 42.05 kg/m  as calculated from the following:    Height as of 8/30/21: 5' 4\" (1.626 m).    Weight as of 8/30/21: 245 lb (111.1 kg).    ----------------    I spent 15 minutes with this patient today. I offer these suggestions for consideration by Dr. Graham. A copy of the visit note was provided to the patient's referring provider.    The patient was sent via [employer] a summary of these recommendations.     Lauren Bloch, PharmD  Medication Therapy Management Pharmacist   [facility] [place]    Telemedicine Visit Details  Type of service:  Telephone visit  Start Time: 3:35 PM  End Time: 3:50 PM  Originating Location (patient location): Home  Distant Location (provider location):  [facility]NAG[facility] [place]     Medication Therapy Recommendations  Class 3 severe obesity due to excess calories with body mass index (BMI) of 40.0 to 44.9 in adult, unspecified whether serious comorbidity present (H)    Current Medication: Phentermine-Topiramate 7.5-46 MG CP24   Rationale: Undesirable effect - Adverse " medication event - Safety   Recommendation: Change Medication - Wegovy 0.25 MG/0.5ML Soaj   Status: Contact Provider - Awaiting Response

## 2021-12-07 NOTE — PATIENT INSTRUCTIONS
Recommendations from today's MTM visit:                                                         1. Consider stopping Qsymia and starting alternative such as Wegovy. I will reach out to Dr. Graham to see his thoughts. See information below about Wegovy.     WEGOVY (semaglutide)    What is Wegovy?    Wegovy (semaglutide) injection 2.4 mg is an injectable prescription medicine used for adults with obesity (BMI ?30) or overweight (excess weight) (BMI ?27) who also have weight-related medical problems to help them lose weight and keep the weight off.    1.  Start Wegovy: It is a subcutaneous injection that you inject once weekly and titrate the dose slowly over time. Make sure inject the same time each week (e.g. every Sunday AM).   Week 1-4: Inject 0.25 mg once weekly  Week 5-8: If tolerating, increase to 0.5 mg once weekly   Week 9-12: If tolerating, increase to 1 mg once weekly   Week 13-16: If tolerating, increase to 1.7 mg once weekly   Week 17 and on: If tolerating, increase to 2.4 mg once weekly   *If you are having some nausea to where you are hesitant to move up to the next dose, stay at the same dose you are on for an additional 4 weeks to see if nausea improves/resolves - let the clinic know if that is the case because an additional prescription will need to be sent in for that current dose for refill. Make sure to take this time to hydrate and ensure you are drinking at least 64 oz water per day.   -Each Wegovy pen is a once weekly single-dose prefilled pen with a pen injector already built within the pen. Discard the Wegovy pen after use in sharps container.     2. Storage: make sure that when you get the prescription that you store the prescription in the refrigerator until it is time to use the Wegovy pen.  Once it is time to use the Wegovy pen, you can keep the pen at room temperature and it is good for up to 28 days at room temperature.     3.  Potential common side effects: nausea, headache,  diarrhea, stomach upset.  If these become unmanageable or concerning symptoms, please make sure to call or mychart.    Go to site: Wegovy video to learn more and watch instruction videos.      Follow-up: 6 weeks with Dr. Graham and 3 month with Lauren Bloch, MTM pharmacist [phone] to schedule.     It was great to speak with you today.  I value your experience and would be very thankful for your time with providing feedback on our clinic survey. You may receive a survey via email or text message in the next few days.       My Clinical Pharmacist's contact information:                                                      Please feel free to contact me with any questions or concerns you have.      Lauren Bloch, PharmD  Medication Therapy Management Pharmacist   [facility] [place]

## 2022-02-20 ENCOUNTER — HEALTH MAINTENANCE LETTER (OUTPATIENT)
Age: 57
End: 2022-02-20

## 2022-04-26 ENCOUNTER — TRANSFERRED RECORDS (OUTPATIENT)
Dept: HEALTH INFORMATION MANAGEMENT | Facility: CLINIC | Age: 57
End: 2022-04-26
Payer: COMMERCIAL

## 2022-05-05 ENCOUNTER — TRANSFERRED RECORDS (OUTPATIENT)
Dept: HEALTH INFORMATION MANAGEMENT | Facility: CLINIC | Age: 57
End: 2022-05-05

## 2022-06-01 ENCOUNTER — TELEPHONE (OUTPATIENT)
Dept: FAMILY MEDICINE | Facility: CLINIC | Age: 57
End: 2022-06-01

## 2022-06-01 ENCOUNTER — VIRTUAL VISIT (OUTPATIENT)
Dept: FAMILY MEDICINE | Facility: CLINIC | Age: 57
End: 2022-06-01
Payer: COMMERCIAL

## 2022-06-01 ENCOUNTER — TELEPHONE (OUTPATIENT)
Dept: FAMILY MEDICINE | Facility: CLINIC | Age: 57
End: 2022-06-01
Payer: COMMERCIAL

## 2022-06-01 DIAGNOSIS — E89.0 POSTSURGICAL HYPOTHYROIDISM: ICD-10-CM

## 2022-06-01 DIAGNOSIS — E78.5 HYPERLIPIDEMIA LDL GOAL <130: ICD-10-CM

## 2022-06-01 PROCEDURE — 99214 OFFICE O/P EST MOD 30 MIN: CPT | Mod: 95 | Performed by: PHYSICIAN ASSISTANT

## 2022-06-01 RX ORDER — LEVOTHYROXINE SODIUM 112 MCG
112 TABLET ORAL DAILY
Qty: 90 TABLET | Refills: 0 | Status: SHIPPED | OUTPATIENT
Start: 2022-06-01 | End: 2022-07-20

## 2022-06-01 RX ORDER — LEVOTHYROXINE SODIUM 112 UG/1
112 TABLET ORAL DAILY
Qty: 90 TABLET | Refills: 0 | Status: SHIPPED | OUTPATIENT
Start: 2022-06-01 | End: 2022-06-01

## 2022-06-01 RX ORDER — ROSUVASTATIN CALCIUM 10 MG/1
10 TABLET, COATED ORAL DAILY
Qty: 90 TABLET | Refills: 0 | Status: SHIPPED | OUTPATIENT
Start: 2022-06-01 | End: 2022-07-20

## 2022-06-01 NOTE — PROGRESS NOTES
"RUBY is a 56 year old who is being evaluated via a billable video visit.      How would you like to obtain your AVS? MyChart  If the video visit is dropped, the invitation should be resent by: Send to e-mail at: [email or web address]  Will anyone else be joining your video visit? No      Video Start Time: 5:34 PM    Assessment & Plan     Hyperlipidemia LDL goal <130  Significant hyperlipidemia and family history of CAD.  Willing to retry crestor.  Labs in approximately 8 weeks  - rosuvastatin (CRESTOR) 10 MG tablet  Dispense: 90 tablet; Refill: 0  - Comprehensive metabolic panel (BMP + Alb, Alk Phos, ALT, AST, Total. Bili, TP)  - Lipid panel reflex to direct LDL Fasting    Postsurgical hypothyroidism  Prefers brand name prescription.  Will check tsh when comes in for physical and labs in approximately 8 weeks   - TSH with free T4 reflex  - SYNTHROID 112 MCG tablet  Dispense: 90 tablet; Refill: 0      Ordering of each unique test  Prescription drug management         BMI:   Estimated body mass index is 42.05 kg/m  as calculated from the following:    Height as of 8/30/21: 1.626 m (5' 4\").    Weight as of 8/30/21: 111.1 kg (245 lb).   Weight management plan: Discussed healthy diet and exercise guidelines    Patient Instructions   Please schedule physical with fasting labs (nothing to eat or drink except water and/or medications 9 hours prior to appointment).     If you prefer we can have you do labs a couple days prior to your appointment with me.   Plan to see me in approximately 8 weeks- then we should know if the crestor is working.            Follow up in 2 month(s) if not improving or any change in symptoms.      Tiana Macdonald PA-C  [facility]   RUBY is a 56 year old who presents for the following health issues     Hyperlipidemia    History of Present Illness       Reason for visit:  Renew meds for thyroid discuss cholesterol medShe consumes 1 sweetened beverage(s) " daily. She exercises with enough effort to increase her heart rate 3 or less days per week.   She is taking medications regularly.     Would prefer synthroid brand name vs generic- when saw endo at one time he said that there is a perceptible difference between the two and was taking brand name and somehow got switched to generic levothyroxine -112 mcg at some time    Willing to try crestor again for hyperlipidemia.   Had a trial of crestor 10 mg in past and unsure if it was side effects from crestor or leg pain related to previous traumatic leg injury- planning on having another foot and ankle reconstruction in November     Aware due for annual exam and pap smear - also due for fasting labs    Declines hepatitis C, HIV test, vitamin D level, not on chronic narcotics     Review of Systems   Constitutional, HEENT, cardiovascular, pulmonary, gi and gu systems are negative, except as otherwise noted.      Objective    Vitals - Patient Reported  Pain Score: No Pain (0)        Physical Exam   Pleasant, conversational in no acute distress, unable to perform physical exam since phone visit- unable to get video visit to work.    No cough or dyspnea,able to talk in complete sentences                Video-Visit Details    Type of service:  Video Visit    Video End Time:5:42 PM    Originating Location (pt. Location): Home    Distant Location (provider location):  [facility]     Platform used for Video Visit: Unable to complete video visit

## 2022-06-01 NOTE — PATIENT INSTRUCTIONS
Please schedule physical with fasting labs (nothing to eat or drink except water and/or medications 9 hours prior to appointment).     If you prefer we can have you do labs a couple days prior to your appointment with me.   Plan to see me in approximately 8 weeks- then we should know if the crestor is working.

## 2022-06-01 NOTE — TELEPHONE ENCOUNTER
LVM @ 4:51 pm for pt to call back to check in for video visit scheduled between 5:00 and 5:20 today.

## 2022-06-01 NOTE — TELEPHONE ENCOUNTER
Please call and work into my schedule for annual physical with fasting labs in approximately 8 weeks

## 2022-06-16 ENCOUNTER — TRANSFERRED RECORDS (OUTPATIENT)
Dept: HEALTH INFORMATION MANAGEMENT | Facility: CLINIC | Age: 57
End: 2022-06-16

## 2022-07-14 ENCOUNTER — LAB (OUTPATIENT)
Dept: LAB | Facility: CLINIC | Age: 57
End: 2022-07-14
Payer: COMMERCIAL

## 2022-07-14 DIAGNOSIS — E89.0 POSTSURGICAL HYPOTHYROIDISM: ICD-10-CM

## 2022-07-14 DIAGNOSIS — E78.5 HYPERLIPIDEMIA LDL GOAL <130: ICD-10-CM

## 2022-07-14 LAB
ALBUMIN SERPL-MCNC: 4.1 G/DL (ref 3.4–5)
ALP SERPL-CCNC: 72 U/L (ref 40–150)
ALT SERPL W P-5'-P-CCNC: 41 U/L (ref 0–50)
ANION GAP SERPL CALCULATED.3IONS-SCNC: 3 MMOL/L (ref 3–14)
AST SERPL W P-5'-P-CCNC: 17 U/L (ref 0–45)
BILIRUB SERPL-MCNC: 0.5 MG/DL (ref 0.2–1.3)
BUN SERPL-MCNC: 22 MG/DL (ref 7–30)
CALCIUM SERPL-MCNC: 9 MG/DL (ref 8.5–10.1)
CHLORIDE BLD-SCNC: 109 MMOL/L (ref 94–109)
CHOLEST SERPL-MCNC: 157 MG/DL
CO2 SERPL-SCNC: 29 MMOL/L (ref 20–32)
CREAT SERPL-MCNC: 0.75 MG/DL (ref 0.52–1.04)
FASTING STATUS PATIENT QL REPORTED: YES
GFR SERPL CREATININE-BSD FRML MDRD: >90 ML/MIN/1.73M2
GLUCOSE BLD-MCNC: 109 MG/DL (ref 70–99)
HDLC SERPL-MCNC: 69 MG/DL
LDLC SERPL CALC-MCNC: 70 MG/DL
NONHDLC SERPL-MCNC: 88 MG/DL
POTASSIUM BLD-SCNC: 4.4 MMOL/L (ref 3.4–5.3)
PROT SERPL-MCNC: 7.4 G/DL (ref 6.8–8.8)
SODIUM SERPL-SCNC: 141 MMOL/L (ref 133–144)
TRIGL SERPL-MCNC: 88 MG/DL
TSH SERPL DL<=0.005 MIU/L-ACNC: 1.73 MU/L (ref 0.4–4)

## 2022-07-14 PROCEDURE — 36415 COLL VENOUS BLD VENIPUNCTURE: CPT

## 2022-07-14 PROCEDURE — 80061 LIPID PANEL: CPT

## 2022-07-14 PROCEDURE — 80053 COMPREHEN METABOLIC PANEL: CPT

## 2022-07-14 PROCEDURE — 84443 ASSAY THYROID STIM HORMONE: CPT

## 2022-07-14 NOTE — RESULT ENCOUNTER NOTE
"Dear Luz Maria  Your thyroid function was normal.   Your blood sugar is borderline elevated.    This is in the \"prediabetes\" range.  You are not currently diabetic, but at risk for developing this disease in the future.   Exercise, weight loss,  and limiting carbohydrates and sugars in the diet can be helpful to avoid progression to diabetes.  At minimum we need to check your blood sugar annually.    Please be seen urgently if you develop any signs or symptoms of diabetes (increase thirst or urination, fatigue, blurry vision, unexplained weight loss).   Your cholesterol looks excellent on the crestor.  I hope you are tolerating it ok.    Please call or MyChart my office with any questions or concerns.   Tiana Macdonald, PAC            "

## 2022-07-20 ENCOUNTER — OFFICE VISIT (OUTPATIENT)
Dept: FAMILY MEDICINE | Facility: CLINIC | Age: 57
End: 2022-07-20
Payer: COMMERCIAL

## 2022-07-20 VITALS
SYSTOLIC BLOOD PRESSURE: 114 MMHG | TEMPERATURE: 98.3 F | HEART RATE: 71 BPM | HEIGHT: 64 IN | OXYGEN SATURATION: 98 % | DIASTOLIC BLOOD PRESSURE: 74 MMHG | BODY MASS INDEX: 41.83 KG/M2 | WEIGHT: 245 LBS

## 2022-07-20 DIAGNOSIS — Z00.00 ROUTINE GENERAL MEDICAL EXAMINATION AT A HEALTH CARE FACILITY: Primary | ICD-10-CM

## 2022-07-20 DIAGNOSIS — E78.5 HYPERLIPIDEMIA LDL GOAL <130: ICD-10-CM

## 2022-07-20 DIAGNOSIS — Z23 NEED FOR SHINGLES VACCINE: ICD-10-CM

## 2022-07-20 DIAGNOSIS — E66.01 CLASS 3 SEVERE OBESITY DUE TO EXCESS CALORIES WITH BODY MASS INDEX (BMI) OF 40.0 TO 44.9 IN ADULT, UNSPECIFIED WHETHER SERIOUS COMORBIDITY PRESENT (H): ICD-10-CM

## 2022-07-20 DIAGNOSIS — E66.813 CLASS 3 SEVERE OBESITY DUE TO EXCESS CALORIES WITH BODY MASS INDEX (BMI) OF 40.0 TO 44.9 IN ADULT, UNSPECIFIED WHETHER SERIOUS COMORBIDITY PRESENT (H): ICD-10-CM

## 2022-07-20 DIAGNOSIS — E89.0 POSTSURGICAL HYPOTHYROIDISM: ICD-10-CM

## 2022-07-20 DIAGNOSIS — Z12.4 CERVICAL CANCER SCREENING: ICD-10-CM

## 2022-07-20 PROCEDURE — 87624 HPV HI-RISK TYP POOLED RSLT: CPT | Performed by: PHYSICIAN ASSISTANT

## 2022-07-20 PROCEDURE — 90471 IMMUNIZATION ADMIN: CPT | Performed by: PHYSICIAN ASSISTANT

## 2022-07-20 PROCEDURE — G0145 SCR C/V CYTO,THINLAYER,RESCR: HCPCS | Performed by: PHYSICIAN ASSISTANT

## 2022-07-20 PROCEDURE — 90750 HZV VACC RECOMBINANT IM: CPT | Performed by: PHYSICIAN ASSISTANT

## 2022-07-20 PROCEDURE — 99396 PREV VISIT EST AGE 40-64: CPT | Mod: 25 | Performed by: PHYSICIAN ASSISTANT

## 2022-07-20 PROCEDURE — 99213 OFFICE O/P EST LOW 20 MIN: CPT | Mod: 25 | Performed by: PHYSICIAN ASSISTANT

## 2022-07-20 RX ORDER — LEVOTHYROXINE SODIUM 112 MCG
112 TABLET ORAL DAILY
Qty: 90 TABLET | Refills: 3 | Status: SHIPPED | OUTPATIENT
Start: 2022-07-20 | End: 2023-11-29

## 2022-07-20 RX ORDER — ROSUVASTATIN CALCIUM 10 MG/1
10 TABLET, COATED ORAL DAILY
Qty: 90 TABLET | Refills: 3 | Status: SHIPPED | OUTPATIENT
Start: 2022-07-20 | End: 2023-01-30

## 2022-07-20 ASSESSMENT — ENCOUNTER SYMPTOMS
JOINT SWELLING: 1
NAUSEA: 0
HEMATOCHEZIA: 0
HEADACHES: 0
MYALGIAS: 0
EYE PAIN: 0
HEARTBURN: 0
DYSURIA: 0
PALPITATIONS: 0
HEMATURIA: 0
SHORTNESS OF BREATH: 0
DIZZINESS: 0
COUGH: 0
SORE THROAT: 0
DIARRHEA: 0
ABDOMINAL PAIN: 0
FREQUENCY: 0
FEVER: 0
ARTHRALGIAS: 1
PARESTHESIAS: 0
CHILLS: 0
NERVOUS/ANXIOUS: 0
WEAKNESS: 0
CONSTIPATION: 0

## 2022-07-20 ASSESSMENT — PAIN SCALES - GENERAL: PAINLEVEL: NO PAIN (0)

## 2022-07-20 NOTE — PROGRESS NOTES
Assessment & Plan     Routine general medical examination at a health care facility  Benign exam except obesity and leg pain related to trauma 2019    Hyperlipidemia LDL goal <130  Lipids greatly controlled with med - unsure if muscle pain related- had stopped but restarted because no change  - rosuvastatin (CRESTOR) 10 MG tablet  Dispense: 90 tablet; Refill: 3    Postsurgical hypothyroidism  euthyorid at current dose   - SYNTHROID 112 MCG tablet  Dispense: 90 tablet; Refill: 3    Cervical cancer screening  Pap pending  - Pap Screen with HPV - recommended age 30 - 65 years    Need for shingles vaccine  Will get booster in 2-5 months   - ZOSTER VACCINE RECOMBINANT ADJUVANTED IM NJX    Class 3 severe obesity due to excess calories with body mass index (BMI) of 40.0 to 44.9 in adult, unspecified whether serious comorbidity present (H)  Has met with weight management specialists  Horrible side effect with phentermine-topamax.  With upcoming long term hoping to lose weight  Advised virtual visit if decides to try topamax alone         Review of the result(s) of each unique test - lipids, cmp, tsh  Prescription drug management         Patient Instructions   Schedule MA (MEDICAL ASSISTANT) visit in 2-6 months for second shingrix vaccine.    We can consider topamax alone if you would like for obesity-set up a virtual visit if desire     Patient Education    Preventive Health Recommendations  Female Ages 50 - 64    Yearly exam: See your health care provider every year in order to  o Review health changes.   o Discuss preventive care.    o Review your medicines if your doctor has prescribed any.      Get a Pap test every three years (unless you have an abnormal result and your provider advises testing more often).    If you get Pap tests with HPV test, you only need to test every 5 years, unless you have an abnormal result.     You do not need a Pap test if your uterus was removed (hysterectomy) and you have not had  cancer.    You should be tested each year for STDs (sexually transmitted diseases) if you're at risk.     Have a mammogram every 1 to 2 years.    Have a colonoscopy at age 50, or have a yearly FIT test (stool test). These exams screen for colon cancer.      Have a cholesterol test every 5 years, or more often if advised.    Have a diabetes test (fasting glucose) every three years. If you are at risk for diabetes, you should have this test more often.     If you are at risk for osteoporosis (brittle bone disease), think about having a bone density scan (DEXA).    Shots: Get a flu shot each year. Get a tetanus shot every 10 years.    Nutrition:     Eat at least 5 servings of fruits and vegetables each day.    Eat whole-grain bread, whole-wheat pasta and brown rice instead of white grains and rice.    Get adequate Calcium and Vitamin D.     Lifestyle    Exercise at least 150 minutes a week (30 minutes a day, 5 days a week). This will help you control your weight and prevent disease.    Limit alcohol to one drink per day.    No smoking.     Wear sunscreen to prevent skin cancer.     See your dentist every six months for an exam and cleaning.    See your eye doctor every 1 to 2 years.        Return in about 1 year (around 7/20/2023), or if symptoms worsen or fail to improve, for in person.    Tiana Macdonald PA-C  [facility]    Felice BELTRAN is a 56 year old, presenting for the following health issues:  No chief complaint on file.      Healthy Habits:     Getting at least 3 servings of Calcium per day:  Yes    Bi-annual eye exam:  Yes    Dental care twice a year:  Yes    Sleep apnea or symptoms of sleep apnea:  Sleep apnea    Diet:  Low fat/cholesterol and Carbohydrate counting    Frequency of exercise:  2-3 days/week    Duration of exercise:  15-30 minutes    Taking medications regularly:  Yes    Medication side effects:  None    PHQ-2 Total Score: 0    Additional concerns today:  No    "patient well known to me with history of sleep apnea (uses CPAP) hyperlipidemia, hypothyroidism, obesity and a very traumatic fall resulting in severe leg injuries and compartment syndrome and several surgeries and planning on last reconstruction in November-     Weight unchanged.   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   HENT: Negative for congestion, hearing loss and sore throat.    Eyes: Negative for pain and visual disturbance.   Respiratory: Negative for cough and shortness of breath.    Cardiovascular: Positive for peripheral edema. Negative for chest pain and palpitations.   Gastrointestinal: Negative for abdominal pain, constipation, diarrhea, heartburn, hematochezia and nausea.   Breasts:  Negative for tenderness and discharge.   Genitourinary: Negative for dysuria, frequency, genital sores, hematuria, pelvic pain, urgency, vaginal bleeding and vaginal discharge.   Musculoskeletal: Positive for arthralgias and joint swelling. Negative for myalgias.   Skin: Negative for rash.   Neurological: Negative for dizziness, weakness, headaches and paresthesias.   Psychiatric/Behavioral: Negative for mood changes. The patient is not nervous/anxious.             Objective    /74   Pulse 71   Temp 98.3  F (36.8  C) (Tympanic)   Ht 1.626 m (5' 4\")   Wt 111.1 kg (245 lb)   LMP 09/10/2012 (Exact Date)   SpO2 98%   BMI 42.05 kg/m    Body mass index is 42.05 kg/m .  Physical Exam   GENERAL: alert, no distress and obese  EYES: Eyes grossly normal to inspection, " PERRL and conjunctivae and sclerae normal  HENT: ear canals and TM's normal, nose and mouth without ulcers or lesions  NECK: no adenopathy, no asymmetry, masses, or scars and thyroid normal to palpation  RESP: lungs clear to auscultation - no rales, rhonchi or wheezes  BREAST: normal without masses, tenderness or nipple discharge and no palpable axillary masses or adenopathy  CV: regular rate and rhythm, normal S1 S2, no S3 or S4, no murmur, click or rub, no peripheral edema and peripheral pulses strong  ABDOMEN: soft, nontender, no hepatosplenomegaly, no masses and bowel sounds normal   (female): normal female external genitalia, normal urethral meatus, vaginal mucosa pink, moist, well rugated, and normal cervix/adnexa/uterus without masses or discharge  MS: right leg with impressive surgical scar   SKIN: no suspicious lesions or rashes  NEURO: Normal strength and tone, mentation intact and speech normal  PSYCH: mentation appears normal, affect normal/bright, judgement and insight intact and appearance well groomed  LYMPH: no cervical, supraclavicular, axillary, or inguinal adenopathy    Lab on 07/14/2022   Component Date Value Ref Range Status     TSH 07/14/2022 1.73  0.40 - 4.00 mU/L Final     Sodium 07/14/2022 141  133 - 144 mmol/L Final     Potassium 07/14/2022 4.4  3.4 - 5.3 mmol/L Final     Chloride 07/14/2022 109  94 - 109 mmol/L Final     Carbon Dioxide (CO2) 07/14/2022 29  20 - 32 mmol/L Final     Anion Gap 07/14/2022 3  3 - 14 mmol/L Final     Urea Nitrogen 07/14/2022 22  7 - 30 mg/dL Final     Creatinine 07/14/2022 0.75  0.52 - 1.04 mg/dL Final     Calcium 07/14/2022 9.0  8.5 - 10.1 mg/dL Final     Glucose 07/14/2022 109 (A) 70 - 99 mg/dL Final     Alkaline Phosphatase 07/14/2022 72  40 - 150 U/L Final     AST 07/14/2022 17  0 - 45 U/L Final     ALT 07/14/2022 41  0 - 50 U/L Final     Protein Total 07/14/2022 7.4  6.8 - 8.8 g/dL Final     Albumin 07/14/2022 4.1  3.4 - 5.0 g/dL Final     Bilirubin Total  07/14/2022 0.5  0.2 - 1.3 mg/dL Final     GFR Estimate 07/14/2022 >90  >60 mL/min/1.73m2 Final    Effective December 21, 2021 eGFRcr in adults is calculated using the 2021 CKD-EPI creatinine equation which includes age and gender (Tony et al., NEJ, DOI: 10.1056/SVMUpw3452222)     Cholesterol 07/14/2022 157  <200 mg/dL Final     Triglycerides 07/14/2022 88  <150 mg/dL Final     Direct Measure HDL 07/14/2022 69  >=50 mg/dL Final     LDL Cholesterol Calculated 07/14/2022 70  <=100 mg/dL Final     Non HDL Cholesterol 07/14/2022 88  <130 mg/dL Final     Patient Fasting > 8hrs? 07/14/2022 Yes   Final                   .  ..

## 2022-07-20 NOTE — NURSING NOTE
Prior to immunization administration, verified patients identity using patient s name and date of birth. Please see Immunization Activity for additional information.     Screening Questionnaire for Adult Immunization    Are you sick today?   No   Do you have allergies to medications, food, a vaccine component or latex?   No   Have you ever had a serious reaction after receiving a vaccination?   No   Do you have a long-term health problem with heart, lung, kidney, or metabolic disease (e.g., diabetes), asthma, a blood disorder, no spleen, complement component deficiency, a cochlear implant, or a spinal fluid leak?  Are you on long-term aspirin therapy?   No   Do you have cancer, leukemia, HIV/AIDS, or any other immune system problem?   No   Do you have a parent, brother, or sister with an immune system problem?   No   In the past 3 months, have you taken medications that affect  your immune system, such as prednisone, other steroids, or anticancer drugs; drugs for the treatment of rheumatoid arthritis, Crohn s disease, or psoriasis; or have you had radiation treatments?   No   Have you had a seizure, or a brain or other nervous system problem?   No   During the past year, have you received a transfusion of blood or blood    products, or been given immune (gamma) globulin or antiviral drug?   No   For women: Are you pregnant or is there a chance you could become       pregnant during the next month?   No   Have you received any vaccinations in the past 4 weeks?   No     Immunization questionnaire answers were all negative.        Per orders of Tiana Macdonald PA-C, injection of SHINGRIX given by Nina Weiss. Patient instructed to remain in clinic for 15 minutes afterwards, and to report any adverse reaction to me immediately.       Screening performed by Nina Weiss on 7/20/2022 at 5:01 PM.

## 2022-07-20 NOTE — PATIENT INSTRUCTIONS
Schedule MA (MEDICAL ASSISTANT) visit in 2-6 months for second shingrix vaccine.    We can consider topamax alone if you would like for obesity-set up a virtual visit if desire     Patient Education    Preventive Health Recommendations  Female Ages 50 - 64    Yearly exam: See your health care provider every year in order to  Review health changes.   Discuss preventive care.    Review your medicines if your doctor has prescribed any.    Get a Pap test every three years (unless you have an abnormal result and your provider advises testing more often).  If you get Pap tests with HPV test, you only need to test every 5 years, unless you have an abnormal result.   You do not need a Pap test if your uterus was removed (hysterectomy) and you have not had cancer.  You should be tested each year for STDs (sexually transmitted diseases) if you're at risk.   Have a mammogram every 1 to 2 years.  Have a colonoscopy at age 50, or have a yearly FIT test (stool test). These exams screen for colon cancer.    Have a cholesterol test every 5 years, or more often if advised.  Have a diabetes test (fasting glucose) every three years. If you are at risk for diabetes, you should have this test more often.   If you are at risk for osteoporosis (brittle bone disease), think about having a bone density scan (DEXA).    Shots: Get a flu shot each year. Get a tetanus shot every 10 years.    Nutrition:   Eat at least 5 servings of fruits and vegetables each day.  Eat whole-grain bread, whole-wheat pasta and brown rice instead of white grains and rice.  Get adequate Calcium and Vitamin D.     Lifestyle  Exercise at least 150 minutes a week (30 minutes a day, 5 days a week). This will help you control your weight and prevent disease.  Limit alcohol to one drink per day.  No smoking.   Wear sunscreen to prevent skin cancer.   See your dentist every six months for an exam and cleaning.  See your eye doctor every 1 to 2 years.

## 2022-07-25 LAB
BKR LAB AP GYN ADEQUACY: NORMAL
BKR LAB AP GYN INTERPRETATION: NORMAL
BKR LAB AP HPV REFLEX: NORMAL
BKR LAB AP PREVIOUS ABNORMAL: NORMAL
PATH REPORT.COMMENTS IMP SPEC: NORMAL
PATH REPORT.COMMENTS IMP SPEC: NORMAL
PATH REPORT.RELEVANT HX SPEC: NORMAL

## 2022-07-26 LAB
HUMAN PAPILLOMA VIRUS 16 DNA: NEGATIVE
HUMAN PAPILLOMA VIRUS 18 DNA: NEGATIVE
HUMAN PAPILLOMA VIRUS FINAL DIAGNOSIS: NORMAL
HUMAN PAPILLOMA VIRUS OTHER HR: NEGATIVE

## 2022-10-13 ENCOUNTER — OFFICE VISIT (OUTPATIENT)
Dept: FAMILY MEDICINE | Facility: CLINIC | Age: 57
End: 2022-10-13
Payer: COMMERCIAL

## 2022-10-13 VITALS
HEIGHT: 65 IN | SYSTOLIC BLOOD PRESSURE: 136 MMHG | TEMPERATURE: 97.2 F | BODY MASS INDEX: 41.07 KG/M2 | HEART RATE: 75 BPM | DIASTOLIC BLOOD PRESSURE: 87 MMHG | WEIGHT: 246.5 LBS | OXYGEN SATURATION: 99 % | RESPIRATION RATE: 17 BRPM

## 2022-10-13 DIAGNOSIS — Z01.818 PREOP GENERAL PHYSICAL EXAM: Primary | ICD-10-CM

## 2022-10-13 DIAGNOSIS — S92.902S CLOSED FRACTURE OF LEFT FOOT, SEQUELA: ICD-10-CM

## 2022-10-13 DIAGNOSIS — G47.33 OBSTRUCTIVE SLEEP APNEA SYNDROME: ICD-10-CM

## 2022-10-13 LAB
ANION GAP SERPL CALCULATED.3IONS-SCNC: 3 MMOL/L (ref 3–14)
BASOPHILS # BLD AUTO: 0 10E3/UL (ref 0–0.2)
BASOPHILS NFR BLD AUTO: 0 %
BUN SERPL-MCNC: 23 MG/DL (ref 7–30)
CALCIUM SERPL-MCNC: 9.5 MG/DL (ref 8.5–10.1)
CHLORIDE BLD-SCNC: 104 MMOL/L (ref 94–109)
CO2 SERPL-SCNC: 31 MMOL/L (ref 20–32)
CREAT SERPL-MCNC: 0.8 MG/DL (ref 0.52–1.04)
EOSINOPHIL # BLD AUTO: 0.1 10E3/UL (ref 0–0.7)
EOSINOPHIL NFR BLD AUTO: 3 %
ERYTHROCYTE [DISTWIDTH] IN BLOOD BY AUTOMATED COUNT: 12.4 % (ref 10–15)
GFR SERPL CREATININE-BSD FRML MDRD: 85 ML/MIN/1.73M2
GLUCOSE BLD-MCNC: 99 MG/DL (ref 70–99)
HCT VFR BLD AUTO: 45.7 % (ref 35–47)
HGB BLD-MCNC: 14.8 G/DL (ref 11.7–15.7)
IMM GRANULOCYTES # BLD: 0 10E3/UL
IMM GRANULOCYTES NFR BLD: 0 %
LYMPHOCYTES # BLD AUTO: 1.3 10E3/UL (ref 0.8–5.3)
LYMPHOCYTES NFR BLD AUTO: 24 %
MCH RBC QN AUTO: 28.3 PG (ref 26.5–33)
MCHC RBC AUTO-ENTMCNC: 32.4 G/DL (ref 31.5–36.5)
MCV RBC AUTO: 87 FL (ref 78–100)
MONOCYTES # BLD AUTO: 0.4 10E3/UL (ref 0–1.3)
MONOCYTES NFR BLD AUTO: 7 %
NEUTROPHILS # BLD AUTO: 3.6 10E3/UL (ref 1.6–8.3)
NEUTROPHILS NFR BLD AUTO: 66 %
PLATELET # BLD AUTO: 180 10E3/UL (ref 150–450)
POTASSIUM BLD-SCNC: 4.5 MMOL/L (ref 3.4–5.3)
RBC # BLD AUTO: 5.23 10E6/UL (ref 3.8–5.2)
SODIUM SERPL-SCNC: 138 MMOL/L (ref 133–144)
WBC # BLD AUTO: 5.5 10E3/UL (ref 4–11)

## 2022-10-13 PROCEDURE — 85025 COMPLETE CBC W/AUTO DIFF WBC: CPT | Performed by: FAMILY MEDICINE

## 2022-10-13 PROCEDURE — 80048 BASIC METABOLIC PNL TOTAL CA: CPT | Performed by: FAMILY MEDICINE

## 2022-10-13 PROCEDURE — 93000 ELECTROCARDIOGRAM COMPLETE: CPT | Performed by: FAMILY MEDICINE

## 2022-10-13 PROCEDURE — 36415 COLL VENOUS BLD VENIPUNCTURE: CPT | Performed by: FAMILY MEDICINE

## 2022-10-13 PROCEDURE — 90750 HZV VACC RECOMBINANT IM: CPT | Performed by: FAMILY MEDICINE

## 2022-10-13 PROCEDURE — 99214 OFFICE O/P EST MOD 30 MIN: CPT | Mod: 25 | Performed by: FAMILY MEDICINE

## 2022-10-13 PROCEDURE — 90471 IMMUNIZATION ADMIN: CPT | Performed by: FAMILY MEDICINE

## 2022-10-13 ASSESSMENT — PAIN SCALES - GENERAL: PAINLEVEL: MODERATE PAIN (5)

## 2022-10-13 NOTE — PATIENT INSTRUCTIONS
Preparing for Your Surgery  Getting started  A nurse will call you to review your health history and instructions. They will give you an arrival time based on your scheduled surgery time. Please be ready to share:    Your doctor's clinic name and phone number    Your medical, surgical and anesthesia history    A list of allergies and sensitivities    A list of medicines, including herbal treatments and over-the-counter drugs    Whether the patient has a legal guardian (ask how to send us the papers in advance)  Please tell us if you're pregnant--or if there's any chance you might be pregnant. Some surgeries may injure a fetus (unborn baby), so they require a pregnancy test. Surgeries that are safe for a fetus don't always need a test, and you can choose whether to have one.   If you have a child who's having surgery, please ask for a copy of Preparing for Your Child's Surgery.    Preparing for surgery    Within 10 to 30 days of surgery: Have a pre-op exam (sometimes called an H&P, or History and Physical). This can be done at a clinic or pre-operative center.  ? If you're having a , you may not need this exam. Talk to your care team.    At your pre-op exam, talk to your care team about all medicines you take. If you need to stop any medicines before surgery, ask when to start taking them again.  ? We do this for your safety. Many medicines can make you bleed too much during surgery. Some change how well surgery (anesthesia) drugs work.    Call your insurance company to let them know you're having surgery. (If you don't have insurance, call [phone].)    Call your clinic if there's any change in your health. This includes signs of a cold or flu (sore throat, runny nose, cough, rash, fever). It also includes a scrape or scratch near the surgery site.    If you have questions on the day of surgery, call your hospital or surgery center.  COVID testing  You may need to be tested for COVID-19 before having  surgery. If so, we will give you instructions (or click here).  Eating and drinking guidelines  For your safety: Unless your surgeon tells you otherwise, follow the guidelines below.    Eat and drink as usual until 8 hours before surgery. After that, no food or milk.    Drink clear liquids until 2 hours before surgery. These are liquids you can see through, like water, Gatorade and Propel Water. You may also have black coffee and tea (no cream or milk).    Nothing by mouth within 2 hours of surgery. This includes gum, candy and breath mints.    If you drink alcohol: Stop drinking it the night before surgery.    If your care team tells you to take medicine on the morning of surgery, it's okay to take it with a sip of water.  Preventing infection    Shower or bathe the night before and morning of your surgery. Follow the instructions your clinic gave you. (If no instructions, use regular soap.)    Don't shave or clip hair near your surgery site. We'll remove the hair if needed.    Don't smoke or vape the morning of surgery. You may chew nicotine gum up to 2 hours before surgery. A nicotine patch is okay.  ? Note: Some surgeries require you to completely quit smoking and nicotine. Check with your surgeon.    Your care team will make every effort to keep you safe from infection. We will:  ? Clean our hands often with soap and water (or an alcohol-based hand rub).  ? Clean the skin at your surgery site with a special soap that kills germs.  ? Give you a special gown to keep you warm. (Cold raises the risk of infection.)  ? Wear special hair covers, masks, gowns and gloves during surgery.  ? Give antibiotic medicine, if prescribed. Not all surgeries need antibiotics.  What to bring on the day of surgery    Photo ID and insurance card    Copy of your health care directive, if you have one    Glasses and hearing aides (bring cases)  ? You can't wear contacts during surgery    Inhaler and eye drops, if you use them (tell us  about these when you arrive)    CPAP machine or breathing device, if you use them    A few personal items, if spending the night    If you have . . .  ? A pacemaker, ICD (cardiac defibrillator) or other implant: Bring the ID card.  ? An implanted stimulator: Bring the remote control.  ? A legal guardian: Bring a copy of the certified (court-stamped) guardianship papers.  Please remove any jewelry, including body piercings. Leave jewelry and other valuables at home.  If you're going home the day of surgery    You must have a responsible adult drive you home. They should stay with you overnight as well.    If you don't have someone to stay with you, and you aren't safe to go home alone, we may keep you overnight. Insurance often won't pay for this.  After surgery  If it's hard to control your pain or you need more pain medicine, please call your surgeon's office.  Questions?   If you have any questions for your care team, list them here: _________________________________________________________________________________________________________________________________________________________________________ ____________________________________ ____________________________________ ____________________________________  For informational purposes only. Not to replace the advice of your health care provider. Copyright   2003, 2019 [facility]. All rights reserved. Clinically reviewed by Oralia Peng MD. [employer] [id number] - REV 07/22.

## 2022-10-13 NOTE — NURSING NOTE
Prior to immunization administration, verified patients identity using patient s name and date of birth. Please see Immunization Activity for additional information.     Screening Questionnaire for Adult Immunization    Are you sick today?   No   Do you have allergies to medications, food, a vaccine component or latex?   No   Have you ever had a serious reaction after receiving a vaccination?   No   Do you have a long-term health problem with heart, lung, kidney, or metabolic disease (e.g., diabetes), asthma, a blood disorder, no spleen, complement component deficiency, a cochlear implant, or a spinal fluid leak?  Are you on long-term aspirin therapy?   No   Do you have cancer, leukemia, HIV/AIDS, or any other immune system problem?   No   Do you have a parent, brother, or sister with an immune system problem?   No   In the past 3 months, have you taken medications that affect  your immune system, such as prednisone, other steroids, or anticancer drugs; drugs for the treatment of rheumatoid arthritis, Crohn s disease, or psoriasis; or have you had radiation treatments?   No   Have you had a seizure, or a brain or other nervous system problem?   No   During the past year, have you received a transfusion of blood or blood    products, or been given immune (gamma) globulin or antiviral drug?   No   For women: Are you pregnant or is there a chance you could become       pregnant during the next month?   No   Have you received any vaccinations in the past 4 weeks?   No     Immunization questionnaire answers were all negative.        Per orders of Dr. Prabhakar, injection of Shingrix given by Dorina Diana. Patient instructed to remain in clinic for 15 minutes afterwards, and to report any adverse reaction to me immediately.       Screening performed by Dorina Diana on 10/13/2022 at 10:40 AM.

## 2022-10-13 NOTE — PROGRESS NOTES
[facility]  [address], [address]  [place] [address]  Phone: [phone]  Fax: [phone]  Primary Provider: Tiana Macdonald  Pre-op Performing Provider: WES ESPINOSA    PREOPERATIVE EVALUATION:  Today's date: 10/13/2022    Thelma Uribe is a 56 year old female who presents for a preoperative evaluation.    Surgical Information:  Surgery/Procedure: LT Foot Surgery  Surgery Location: [facility]  Surgeon: Dr Basilio  Surgery Date: 11/2/2022  Time of Surgery: To be determined  Where patient plans to recover: At home with family  Fax number for surgical facility: [phone]    Type of Anesthesia Anticipated: General    Assessment & Plan     The proposed surgical procedure is considered INTERMEDIATE risk.    Preop general physical exam  56-year-old female here for preoperative evaluation prior to open fusing of left foot.  This will be her fifth surgery on that foot.  No issues with previous surgeries.  - EKG 12-lead complete w/read - Clinics  - CBC with platelets and differential; Future  - Basic metabolic panel  (Ca, Cl, CO2, Creat, Gluc, K, Na, BUN); Future  - CBC with platelets and differential  - Basic metabolic panel  (Ca, Cl, CO2, Creat, Gluc, K, Na, BUN)    Closed fracture of left foot, sequela  Previous fracture several months ago.    Obstructive sleep apnea  May use home CPAP if kept overnight    Risks and Recommendations:  The patient has the following additional risks and recommendations for perioperative complications:   - No identified additional risk factors other than previously addressed    Medication Instructions:   - naproxen (Aleve, Naprosyn): HOLD 4 days before surgery.     RECOMMENDATION:  APPROVAL GIVEN to proceed with proposed procedure, without further diagnostic evaluation.        Subjective     HPI related to upcoming procedure: Previous left foot fracture with malunion    Preop Questions 10/11/2022   1. Have you ever had  a heart attack or stroke? No   2. Have you ever had surgery on your heart or blood vessels, such as a stent placement, a coronary artery bypass, or surgery on an artery in your head, neck, heart, or legs? No   3. Do you have chest pain with activity? No   4. Do you have a history of  heart failure? No   5. Do you currently have a cold, bronchitis or symptoms of other infection? No   6. Do you have a cough, shortness of breath, or wheezing? No   7. Do you or anyone in your family have previous history of blood clots? No   8. Do you or does anyone in your family have a serious bleeding problem such as prolonged bleeding following surgeries or cuts? No   9. Have you ever had problems with anemia or been told to take iron pills? No   10. Have you had any abnormal blood loss such as black, tarry or bloody stools, or abnormal vaginal bleeding? No   11. Have you ever had a blood transfusion? YES    11a. Have you ever had a transfusion reaction? No   12. Are you willing to have a blood transfusion if it is medically needed before, during, or after your surgery? Yes   13. Have you or any of your relatives ever had problems with anesthesia? No   14. Do you have sleep apnea, excessive snoring or daytime drowsiness? YES    14a. Do you have a CPAP machine? Yes   15. Do you have any artifical heart valves or other implanted medical devices like a pacemaker, defibrillator, or continuous glucose monitor? No   16. Do you have artificial joints? No   17. Are you allergic to latex? No   18. Is there any chance that you may be pregnant? No       Health Care Directive:  Patient does not have a Health Care Directive or Living Will: Patient states has Advance Directive and will bring in a copy to clinic.    Preoperative Review of :   reviewed - no record of controlled substances prescribed.    Review of Systems  Constitutional, neuro, ENT, endocrine, pulmonary, cardiac, gastrointestinal, genitourinary, musculoskeletal, integument  and psychiatric systems are negative, except as otherwise noted.    Patient Active Problem List    Diagnosis Date Noted     Postsurgical hypothyroidism 06/01/2022     Priority: Medium     Morbid obesity (H) 05/28/2019     Priority: Medium     Hyperlipidemia LDL goal <130 05/28/2019     Priority: Medium     Family history of ischemic heart disease 02/19/2016     Priority: Medium     Thyroid nodule 05/01/2012     Priority: Medium     Endo (Dr. Kwon) thought benign cyst, L thyroidectomy confirmed benign. Dr. Kwon following thyroid levels s/p surgery. Dx w/ hashimoto's thyroiditis per pt.  2/16- has been stable on levothyroxine through Dr. Kwon, can start following here.    **2/18 consult-   Dr. White concerned about various sx's- fatigue, memory loss/concentration issues.  He reviewed her chart, and noted 'her TSH was never high in '15 when synthroid was started, which raises the question if she is truly dependent on thyroid hormone.  Given her lymphocytic thyroiditis, she may have another autoimmune condition as well'.  Will review at f/u appt.       GERD (gastroesophageal reflux disease)      Priority: Medium     prilosec (trial off of it in '12/'13), 2/16 has been off meds for awhile without meds       CARDIOVASCULAR SCREENING; LDL GOAL LESS THAN 160 10/31/2010     Priority: Medium      Past Medical History:   Diagnosis Date     Dysthymic disorder     much improved, took celexa x 1mo in '06     Elevated blood pressure reading without diagnosis of hypertension     in '10, much improved with diet/exercise     Exercise induced bronchospasm     takes albuterol very rarely     Female infertility of other specified origin     stopped trying     GERD (gastroesophageal reflux disease)     prilosec for awhile, stable off for yrs (2/16)     Thyroid disease      Past Surgical History:   Procedure Laterality Date     COLONOSCOPY WITH CO2 INSUFFLATION N/A 6/20/2016    Procedure: COLONOSCOPY WITH CO2 INSUFFLATION;  Surgeon:  Miguelito Erazo MD;  Location: [facility] OR     GYN SURGERY       HC REMOVE TONSILS/ADENOIDS,12+ Y/O  Age 14     left navicular fracture   12/17/2019     ORIF right tibial plateau fracture Right 12/26/2019     ORTHOPEDIC SURGERY       percutaneous pinning of foot Left 12/26/2019     right knee external fixator Right 12/17/2019     right leg fasciotomy with wound vac placement  12/17/2019     THYROIDECTOMY  5/8/2012    Procedure:THYROIDECTOMY; LEFT THYROID LOBECTOMY; Surgeon:STEPHANIA FERNÁNDEZ; Location:[facility] OR     UPPER GI ENDOSCOPY  2/12    bx's okay, rec different PPI     ZC APPENDECTOMY  Age 11     [facility] NONSPECIFIC PROCEDURE  1988    Right oophorectomy and dermoid cyst removal     [facility] NONSPECIFIC PROCEDURE  1997    Partial Left oophorectomy and dermoid cyst removal     [facility] NONSPECIFIC PROCEDURE  2002    L 4-5 herniated disc w/ laminectomy     [facility] NONSPECIFIC PROCEDURE  4/2003    Midcarpal Instability-Right wrist reconstruction     [facility] NONSPECIFIC PROCEDURE  1999    Left breast biopsy-benign     Current Outpatient Medications   Medication Sig Dispense Refill     Multiple Vitamin (THERAPEUTIC MULTIVITAMIN PO)        naproxen sodium (ANAPROX) 220 MG tablet Take 220 mg by mouth 2 times daily as needed for moderate pain       rosuvastatin (CRESTOR) 10 MG tablet Take 1 tablet (10 mg) by mouth daily 90 tablet 3     SYNTHROID 112 MCG tablet Take 1 tablet (112 mcg) by mouth daily 90 tablet 3     topiramate (TOPAMAX) 25 MG tablet Take 1 tablet (25 mg) by mouth daily for 7 days, THEN 1 tablet (25 mg) 2 times daily. 60 tablet 0       Allergies   Allergen Reactions     Hydrocodone-Acetaminophen Hives, Itching and Nausea and Vomiting     Oxycodone Hives, Itching, Nausea and Vomiting and Rash        Social History     Tobacco Use     Smoking status: Never     Smokeless tobacco: Never   Substance Use Topics     Alcohol use: Yes     Alcohol/week: 0.0 standard drinks     Comment: 2-3 Drinks Per Week     Family History   Problem Relation Age  of Onset     Cancer Mother 89        Pancreatic     Hypertension Mother      Arthritis Mother      Lipids Mother      Osteoporosis Mother         on meds, did have metarsal fx from slip     Thyroid Disease Mother         Hypothyroid     Coronary Artery Disease Mother         cardiac stents     Other Cancer Mother         pancreatic     C.A.D. Father 65        Quadruple bypass- age 65     Hypertension Father      Prostate Cancer Father      Blood Disease Father         Anti-cardiolipin syndrome, passed away from aneursym      Lipids Father      Thyroid Disease Father         Hypothyroid     Coronary Artery Disease Father         quad bypass     Hyperlipidemia Father      Diabetes Maternal Grandmother         Type 2     Diabetes Paternal Grandfather      Coronary Artery Disease Brother 54        Stent at 54 yrs     Other Cancer Brother         kidney cancer     Coronary Artery Disease Brother         quad bypass     Other Cancer Brother         kidney     Kidney Cancer Brother 49        smoker     Blood Disease Sister         Vonwillenbrand Syndrome     Thyroid Disease Sister      Cancer Sister 56         of pancreatic cancer w/in 3 mo of dx     Other Cancer Sister         pancreatic     Thyroid Disease Sister      Cerebrovascular Disease No family hx of      Breast Cancer No family hx of      Colon Cancer No family hx of      Depression No family hx of      Anxiety Disorder No family hx of      Mental Illness No family hx of      Substance Abuse No family hx of      Anesthesia Reaction No family hx of      Asthma No family hx of      Genetic Disorder No family hx of      Obesity No family hx of      Unknown/Adopted No family hx of      History   Drug Use No         Objective     LMP 09/10/2012 (Exact Date)     Physical Exam    GENERAL APPEARANCE: healthy, alert and no distress     EYES: EOMI, PERRL     HENT: ear canals and TM's normal and nose and mouth without ulcers or lesions     NECK: no adenopathy, no  asymmetry, masses, or scars and thyroid normal to palpation     RESP: lungs clear to auscultation - no rales, rhonchi or wheezes     CV: regular rates and rhythm, normal S1 S2, no S3 or S4 and no murmur, click or rub     ABDOMEN:  soft, nontender, no HSM or masses and bowel sounds normal     MS: extremities normal- no gross deformities noted, no evidence of inflammation in joints, FROM in all extremities.     SKIN: no suspicious lesions or rashes     NEURO: Normal strength and tone, sensory exam grossly normal, mentation intact and speech normal     PSYCH: mentation appears normal. and affect normal/bright     LYMPHATICS: No cervical adenopathy    Recent Labs   Lab Test 07/14/22  0724 04/13/21  0814    138   POTASSIUM 4.4 3.9   CR 0.75 0.68        Diagnostics:  Labs pending at this time.  Results will be reviewed when available.   EKG: appears normal, NSR, normal axis, normal intervals, no acute ST/T changes c/w ischemia, no LVH by voltage criteria    Revised Cardiac Risk Index (RCRI):  The patient has the following serious cardiovascular risks for perioperative complications:   - No serious cardiac risks = 0 points     RCRI Interpretation: 0 points: Class I (very low risk - 0.4% complication rate)           Signed Electronically by: Jj Prabhakar MD  Copy of this evaluation report is provided to requesting physician.

## 2022-11-01 ENCOUNTER — VIRTUAL VISIT (OUTPATIENT)
Dept: FAMILY MEDICINE | Facility: CLINIC | Age: 57
End: 2022-11-01
Payer: COMMERCIAL

## 2022-11-01 DIAGNOSIS — E66.01 MORBID OBESITY (H): Primary | ICD-10-CM

## 2022-11-01 PROCEDURE — 99213 OFFICE O/P EST LOW 20 MIN: CPT | Mod: 95 | Performed by: PHYSICIAN ASSISTANT

## 2022-11-01 RX ORDER — TOPIRAMATE 25 MG/1
25 TABLET, FILM COATED ORAL 2 TIMES DAILY
Qty: 180 TABLET | Refills: 1 | Status: SHIPPED | OUTPATIENT
Start: 2022-11-01 | End: 2023-04-06

## 2022-11-01 NOTE — PROGRESS NOTES
RUBY is a 56 year old who is being evaluated via a billable telephone visit.      What phone number would you like to be contacted at? [phone]  How would you like to obtain your AVS? MyChart    Assessment & Plan     Morbid obesity (H)  Working on diet.  topamax 25 mg daily has been helpful but interested in twice a day dosing - med refilled.  Follow up with us in approximately 6 months   - topiramate (TOPAMAX) 25 MG tablet  Dispense: 180 tablet; Refill: 1      Prescription drug management  10 minutes spent on the date of the encounter doing chart review, history and exam, documentation and further activities per the note       Patient Instructions   Increase topamax from 25 mg daily to 25 mg twice a day   Follow up with us in approximately 6 months.  Return urgently if any change in symptoms, side effects or concerns          Return in about 6 months (around 5/1/2023), or if symptoms worsen or fail to improve, for using a video visit.    Tiana Macdonald PA-C  [facility]   RUBY is a 56 year old, presenting for the following health issues:  Recheck Medication      History of Present Illness       Reason for visit:  Renew medication topamax at the bid doseShe consumes 0 sweetened beverage(s) daily. She exercises with enough effort to increase her heart rate 3 or less days per week.   She is taking medications regularly.       Medication Followup of topiramate    Taking Medication as prescribed: yes    Side Effects:  At first dealing with low blood sugars but this has improved and pt feels okay taking this now    Medication Helping Symptoms:  Yes feels like this medication helps control appetite  Will be having Surgery tomorrow   topamax 25 mg has been helpful but interested in twice a day dosing.   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      Objective    Vitals - Patient Reported  Pain Score: No Pain (0)        Physical Exam   healthy, alert and no distress  PSYCH: Alert and oriented times 3; coherent speech, normal   rate and volume, able to articulate logical thoughts, able   to abstract reason, no tangential thoughts, no hallucinations   or delusions  Her affect is normal and pleasant  RESP: No cough, no audible wheezing, able to talk in full sentences  Remainder of exam unable to be completed due to telephone visits                Phone call duration: 3 minutes

## 2022-11-01 NOTE — PATIENT INSTRUCTIONS
Increase topamax from 25 mg daily to 25 mg twice a day   Follow up with us in approximately 6 months.  Return urgently if any change in symptoms, side effects or concerns

## 2022-11-17 ENCOUNTER — TRANSFERRED RECORDS (OUTPATIENT)
Dept: HEALTH INFORMATION MANAGEMENT | Facility: CLINIC | Age: 57
End: 2022-11-17

## 2022-12-13 ENCOUNTER — TRANSFERRED RECORDS (OUTPATIENT)
Dept: HEALTH INFORMATION MANAGEMENT | Facility: CLINIC | Age: 57
End: 2022-12-13

## 2022-12-29 ENCOUNTER — TRANSFERRED RECORDS (OUTPATIENT)
Dept: HEALTH INFORMATION MANAGEMENT | Facility: CLINIC | Age: 57
End: 2022-12-29

## 2023-01-26 ENCOUNTER — TRANSFERRED RECORDS (OUTPATIENT)
Dept: HEALTH INFORMATION MANAGEMENT | Facility: CLINIC | Age: 58
End: 2023-01-26

## 2023-01-29 ENCOUNTER — TELEPHONE (OUTPATIENT)
Dept: FAMILY MEDICINE | Facility: CLINIC | Age: 58
End: 2023-01-29
Payer: COMMERCIAL

## 2023-01-29 NOTE — TELEPHONE ENCOUNTER
COVID Positive/Requesting COVID treatment    Patient is positive for COVID and requesting treatment options.    Date of positive COVID test (PCR or at home)? 1/26/23  Current COVID symptoms: fever or chills, cough, shortness of breath or difficulty breathing, fatigue, headache and congestion or runny nose  Date COVID symptoms began: 1/25/23    Message should be routed to clinic RN pool. Best phone number to use for call back: [phone]

## 2023-01-30 ENCOUNTER — VIRTUAL VISIT (OUTPATIENT)
Dept: FAMILY MEDICINE | Facility: CLINIC | Age: 58
End: 2023-01-30
Payer: COMMERCIAL

## 2023-01-30 DIAGNOSIS — U07.1 INFECTION DUE TO 2019 NOVEL CORONAVIRUS: Primary | ICD-10-CM

## 2023-01-30 PROCEDURE — 99213 OFFICE O/P EST LOW 20 MIN: CPT | Mod: CS | Performed by: FAMILY MEDICINE

## 2023-01-30 RX ORDER — BENZONATATE 100 MG/1
100 CAPSULE ORAL 3 TIMES DAILY PRN
Qty: 30 CAPSULE | Refills: 0 | Status: SHIPPED | OUTPATIENT
Start: 2023-01-30 | End: 2023-02-08

## 2023-01-30 NOTE — PATIENT INSTRUCTIONS
Consider using albuterol as needed for cough  Mucinex 600-1200 mg twice daily as needed for congestion.  Consider sleeping prone if able  Monitor oxygen. Be seen in person if <95%. Be seen in ER if < 90%        COVID-19 Outpatient Treatments  Your care team can help you find the best treatments for COVID-19. Talk to a health care provider or refer to the FDA medicine fact sheets below.  Important: You can't have Paxlovid or molnupiravir if you're starting the medicine more than 5 days after your symptoms have started.  Paxlovid: https://www.fda.gov/media/656685/download  Molnupiravir (Lagevrio): https://www.fda.gov/media/654424/download  Paxlovid (nimatrelvir and ritonavir)  How it works  Two medicines (nirmatrelvir and ritonavir) are taken together. They stop the virus from growing. Less amount of virus is easier for your body to fight.  Benefits  Lowers risk of a hospital stay or death from COVID-19.  How to take  Medicine comes in a daily container with both medicine tablets. Take by mouth twice daily (once in the morning, once at night) for 5 days.  The number of tablets to take varies by patient.  Don't chew or break capsules. Swallow whole.  When to take  Take as soon as possible after positive COVID-19 test result, and within 5 days of your first symptoms.  Who can take it  Patients must be 12 years or older, weigh at least 88 pounds, and have tested positive for COVID-19. Paxlovid is the preferred treatment for pregnant patients.  Possible side effects  Can cause altered sense of taste, diarrhea (loose, watery stools), high blood pressure, muscle aches.  Medicine conflicts  Some medicines may conflict with Paxlovid and may cause serious side effects.  Tell your care team about all the medicines you take, including prescription and over-the-counter medicines, vitamins, and herbal supplements.  Your care team will review your medicines to make sure that you can safely take Paxlovid.  Cautions  Paxlovid is not  advised for patients with severe kidney or liver disease. If you have kidney or liver problems, the dose may need to be changed.  If you're pregnant or breastfeeding, talk to your care team about your options.  If you take hormonal birth control (such as the Pill), then you or your partner should also use a non-hormonal form of birth control (such as a condom). Keep doing this for 1 menstrual cycle after your last dose of Paxlovid.  Molnupiravir (Lagevrio)  How it works  Stops the virus from growing. Less amount of virus is easier for your body to fight.  Benefits  Lowers risk of a hospital stay or death from COVID-19.  How to take  Take 4 capsules by mouth every 12 hours (4 in the morning and 4 at night) for 5 days. Don't chew or break capsules. Swallow whole.  When to take  Take as soon as possible after positive COVID-19 test result, and within 5 days of your first symptoms.  Who can take it  Patients must be 18 years or older and have tested positive for COVID-19.  Possible side effects  Diarrhea (loose, watery stools), nausea (feeling sick to your stomach), dizziness, headaches.  Medicine conflicts  Right now, there are no known conflicts with other drugs. But tell your care team about all medicines you take.  Cautions  This medicine is not advised for patients who are pregnant.  If you are someone who could become pregnant, use trusted birth control until 4 days after your last dose of molnupiravir.  If your partner could become pregnant, you should use trusted birth control until 3 months after your last dose of molnupiravir.  For informational purposes only. Not to replace the advice of your health care provider. Copyright   2022 [facility]. All rights reserved. Clinically reviewed by Ebony Palmer, PharmD, BCACP. [employer] [id number] - REV 12/22.    Instructions for Patients      What are the symptoms of COVID-19?  Symptoms can include fever, cough, shortness of breath, chills, headache, muscle  pain sore throat, fatigue, runny or stuffy nose, and loss of taste and smell. Other less common symptoms include nausea, vomiting, or diarrhea (watery stools).    Know when to call 911. Emergency warning signs include:  Trouble breathing or shortness of breath  Pain or pressure in the chest that doesn't go away  Feeling confused like you haven't felt before, or not being able to wake up  Bluish-colored lips or face    How can I take care of myself?  Get lots of rest. Drink extra fluids (unless a doctor has told you not to).  Take Tylenol (acetaminophen) for fever or pain. If you have liver or kidney problems, ask your family doctor if it's okay to take Tylenol   Adults:   650 mg (two 325 mg pills or tablets) every 4 to 6 hours, or...   1,000 mg (two 500 mg pills or tablets) every 8 hours as needed.  Note: Don't take more than 3,000 mg in one day. Acetaminophen is found in many medicines (both prescribed and over the counter). Read all labels to be sure you don't take too much.  For children, check the Tylenol bottle for the right dose. The dose is based on the child's age or weight.  Take over the counter medicines for your symptoms as needed. Talk to your pharmacist.  If you have other health problems (like cancer, heart failure, an organ transplant, or severe kidney disease): Call your specialty clinic if you don't feel better in the next 2 days.    Where can I get more information?  [facility] COVID-19 Resource Hub: [email or web address]   CDC Quarantine & Isolation: https://www.cdc.gov/coronavirus/2019-ncov/your-health/quarantine-isolation.html   CDC - What to Do If You're Sick: https://www.cdc.gov/coronavirus/2019-ncov/if-you-are-sick/index.html

## 2023-01-30 NOTE — PROGRESS NOTES
"RUBY is a 57 year old who is being evaluated via a billable telephone visit.      What phone number would you like to be contacted at? [phone]   How would you like to obtain your AVS? Skye    Distant Location (provider location):  Off-site    Assessment & Plan     Infection due to 2019 novel coronavirus  RF:  BMI, LETY, hyperlipidemia  Reviewed symptomatic management of symptoms. Patient education provided, including expected course of illness and symptoms that may occur which would require urgent evalution. All questions answered.   Patient understands and agrees with plan.    - benzonatate (TESSALON) 100 MG capsule; Take 1 capsule (100 mg) by mouth 3 times daily as needed for cough  - nirmatrelvir and ritonavir (PAXLOVID) therapy pack; Take 3 tablets by mouth 2 times daily for 5 days (Take 2 Nirmatrelvir tablets and 1 Ritonavir tablet twice daily for 5 days)         BMI:   Estimated body mass index is 41.07 kg/m  as calculated from the following:    Height as of 10/13/22: 1.65 m (5' 4.96\").    Weight as of 10/13/22: 111.8 kg (246 lb 8 oz).       See Patient Instructions  COVID-19 positive patient.  Encounter for consideration of medication intervention. Patient does qualify for a prescription. Full discussion with patient including medication options, risks and benefits. Potential drug interactions reviewed with patient.     Treatment Planned Paxlovid, Rx sent to [place] [facility]  [facility] [phone] [id number]  [place] [address]    Hours:  Mon-Fri: 9:00a - 5:00p    Curbside Pickup available  Temporary change to home medications:  None     Estimated body mass index is 41.07 kg/m  as calculated from the following:    Height as of 10/13/22: 1.65 m (5' 4.96\").    Weight as of 10/13/22: 111.8 kg (246 lb 8 oz).  GFR Estimate   Date Value Ref Range Status   10/13/2022 85 >60 mL/min/1.73m2 Final     Comment:     Effective December 21, 2021 eGFRcr in adults is calculated " using the 2021 CKD-EPI creatinine equation which includes age and gender (Tony et al., NE, DOI: 10.1056/AELKor9886587)   04/13/2021 >90 >60 mL/min/[1.73_m2] Final     Comment:     Non  GFR Calc  Starting 12/18/2018, serum creatinine based estimated GFR (eGFR) will be   calculated using the Chronic Kidney Disease Epidemiology Collaboration   (CKD-EPI) equation.       No results found for: [id number]    Return in about 3 days (around 2/2/2023), or if symptoms worsen or fail to improve.    Sona Scherer MD  [facility] [place]    Subjective   RUBY is a 57 year old, presenting for the following health issues:  Covid Concern      History of Present Illness       Reason for visit:  Covid symptoms  Symptom onset:  3-7 days ago  Symptoms include:  Cough,cough, cough congestion, fatigue, fever, sob  Symptom intensity:  Moderate  Symptom progression:  Improving  Had these symptoms before:  No  What makes it worse:  Lying down  What makes it better:  Warm liquids, tylenol, theraflu, hot shower all temp relief, use my capShe consumes 0 sweetened beverage(s) daily.She exercises with enough effort to increase her heart rate 20 to 29 minutes per day.  She exercises with enough effort to increase her heart rate 4 days per week.   She is taking medications regularly.         COVID-19 Symptom Review  How many days ago did these symptoms start? 3-7 days ago  Tested positive: 1/26/23  Are any of the following symptoms significant for you?  New or worsening difficulty breathing? Yes    Please describe what kind of difficulty you are having breathing:Mild dyspnea (able to do ADLs without difficulty, mild shortness of breath with activities such as climbing one or two flights of stairs or walking briskly)    Worsening cough? Yes, it's a dry cough.     Fever or chills? Yes, the highest temperature was 100.9    Headache: YES    Sore throat: YES    Chest pain: No    Diarrhea: No    Body aches?  YES    What treatments has patient tried?  Warm liquids, tylenol, hot shower, cap  Does patient live in a nursing home, group home, or shelter? No  Does patient have a way to get food/medications during quarantined? Yes, I have a friend or family member who can help me.          Wife was sick on Tues. Got paxlovid and got much better.   Patient's sx's Started on Thurs (4 days ago)  Sx's started with frontal headache and congestion, clear drainage  Now cough is the most problematic. Getting rib pain from so much coughing.   Uses cpap but needing to sleep in chair Hard to lay flat due to coughing.   Took some oral steroids (40 mg prednisone) last night- did help. Helped her to lay flat.     Not checked pulse ox  Minimal dyspnea (same as with other colds with climbing stairs).   No pleuritic/exertional cp  No peripheral edema            Objective           Vitals:  No vitals were obtained today due to virtual visit.    Physical Exam   alert and no distress  PSYCH: Alert and oriented times 3; coherent speech, normal   rate and volume, able to articulate logical thoughts, able   to abstract reason, no tangential thoughts, no hallucinations   or delusions  Her affect is normal  RESP: No cough, no audible wheezing, able to talk in full sentences  Remainder of exam unable to be completed due to telephone visits              Phone call duration: 14 minutes

## 2023-01-30 NOTE — TELEPHONE ENCOUNTER
RN called patient and discussed when her symptoms began and what treatment options are available. Patient had questions about side effects of the medication as well has how it will interact with her current medications. RN advised a telephone visit with a provider would be required to discuss these concerns further. Patient verbalized understanding and agreed to plan. Appointment scheduled for 01/30/23.    Leidy Begum RN    [facility]

## 2023-02-03 ENCOUNTER — TELEPHONE (OUTPATIENT)
Dept: ENDOCRINOLOGY | Facility: CLINIC | Age: 58
End: 2023-02-03
Payer: COMMERCIAL

## 2023-02-03 NOTE — TELEPHONE ENCOUNTER
Pt tested positive for covid on 1/26 and wants to know if she can still have in-person appt on 2/8    [phone]

## 2023-02-03 NOTE — TELEPHONE ENCOUNTER
Regarding positive Covid on 1/26: Pt called and told she can keep her in-person visit on 2/8 if she is afebrile and asymptomatic.

## 2023-02-08 ENCOUNTER — OFFICE VISIT (OUTPATIENT)
Dept: ENDOCRINOLOGY | Facility: CLINIC | Age: 58
End: 2023-02-08
Payer: COMMERCIAL

## 2023-02-08 ENCOUNTER — LAB (OUTPATIENT)
Dept: LAB | Facility: CLINIC | Age: 58
End: 2023-02-08
Payer: COMMERCIAL

## 2023-02-08 VITALS
WEIGHT: 238.6 LBS | OXYGEN SATURATION: 100 % | DIASTOLIC BLOOD PRESSURE: 85 MMHG | TEMPERATURE: 98.4 F | RESPIRATION RATE: 16 BRPM | SYSTOLIC BLOOD PRESSURE: 129 MMHG | BODY MASS INDEX: 40.74 KG/M2 | HEIGHT: 64 IN | HEART RATE: 79 BPM

## 2023-02-08 DIAGNOSIS — E66.01 CLASS 3 SEVERE OBESITY WITH SERIOUS COMORBIDITY AND BODY MASS INDEX (BMI) OF 40.0 TO 44.9 IN ADULT, UNSPECIFIED OBESITY TYPE (H): Primary | ICD-10-CM

## 2023-02-08 DIAGNOSIS — G47.33 OSA (OBSTRUCTIVE SLEEP APNEA): ICD-10-CM

## 2023-02-08 DIAGNOSIS — E66.813 CLASS 3 SEVERE OBESITY WITH SERIOUS COMORBIDITY AND BODY MASS INDEX (BMI) OF 40.0 TO 44.9 IN ADULT, UNSPECIFIED OBESITY TYPE (H): Primary | ICD-10-CM

## 2023-02-08 DIAGNOSIS — E66.01 CLASS 3 SEVERE OBESITY WITH SERIOUS COMORBIDITY AND BODY MASS INDEX (BMI) OF 40.0 TO 44.9 IN ADULT, UNSPECIFIED OBESITY TYPE (H): ICD-10-CM

## 2023-02-08 DIAGNOSIS — E66.813 CLASS 3 SEVERE OBESITY WITH SERIOUS COMORBIDITY AND BODY MASS INDEX (BMI) OF 40.0 TO 44.9 IN ADULT, UNSPECIFIED OBESITY TYPE (H): ICD-10-CM

## 2023-02-08 DIAGNOSIS — Z83.2 FAMILY HISTORY OF BLEEDING OR CLOTTING DISORDER: ICD-10-CM

## 2023-02-08 LAB
ALBUMIN SERPL BCG-MCNC: 4.6 G/DL (ref 3.5–5.2)
ALP SERPL-CCNC: 97 U/L (ref 35–104)
ALT SERPL W P-5'-P-CCNC: 34 U/L (ref 10–35)
ANION GAP SERPL CALCULATED.3IONS-SCNC: 9 MMOL/L (ref 7–15)
AST SERPL W P-5'-P-CCNC: 23 U/L (ref 10–35)
BILIRUB SERPL-MCNC: 0.3 MG/DL
BUN SERPL-MCNC: 19.3 MG/DL (ref 6–20)
CALCIUM SERPL-MCNC: 9.9 MG/DL (ref 8.6–10)
CHLORIDE SERPL-SCNC: 105 MMOL/L (ref 98–107)
CREAT SERPL-MCNC: 0.79 MG/DL (ref 0.51–0.95)
DEPRECATED CALCIDIOL+CALCIFEROL SERPL-MC: 38 UG/L (ref 20–75)
DEPRECATED HCO3 PLAS-SCNC: 26 MMOL/L (ref 22–29)
GFR SERPL CREATININE-BSD FRML MDRD: 86 ML/MIN/1.73M2
GLUCOSE SERPL-MCNC: 114 MG/DL (ref 70–99)
POTASSIUM SERPL-SCNC: 4.9 MMOL/L (ref 3.4–5.3)
PROT SERPL-MCNC: 8 G/DL (ref 6.4–8.3)
PTH-INTACT SERPL-MCNC: 43 PG/ML (ref 15–65)
SODIUM SERPL-SCNC: 140 MMOL/L (ref 136–145)

## 2023-02-08 PROCEDURE — 99205 OFFICE O/P NEW HI 60 MIN: CPT | Performed by: NURSE PRACTITIONER

## 2023-02-08 PROCEDURE — 83036 HEMOGLOBIN GLYCOSYLATED A1C: CPT | Performed by: NURSE PRACTITIONER

## 2023-02-08 PROCEDURE — 36415 COLL VENOUS BLD VENIPUNCTURE: CPT | Performed by: PATHOLOGY

## 2023-02-08 PROCEDURE — 83970 ASSAY OF PARATHORMONE: CPT | Performed by: PATHOLOGY

## 2023-02-08 PROCEDURE — 82306 VITAMIN D 25 HYDROXY: CPT | Performed by: NURSE PRACTITIONER

## 2023-02-08 PROCEDURE — 80053 COMPREHEN METABOLIC PANEL: CPT | Performed by: PATHOLOGY

## 2023-02-08 ASSESSMENT — PAIN SCALES - GENERAL: PAINLEVEL: NO PAIN (0)

## 2023-02-08 NOTE — PATIENT INSTRUCTIONS
"Thank you for allowing us the privilege of caring for you. We hope we provided you with the excellent service you deserve.   Please let us know if there is anything else we can do for you so that we can be sure you are completely satisfied with your care experience.    To ensure the quality of our services you may be receiving a patient satisfaction survey from an independent patient satisfaction monitoring company.    The greatest compliment you can give is a \"Likely to Recommend\"    Your visit was with Charis Fuller NP today.    Instructions per today's visit:     Braden Uribe, it was great to visit with you today.  Here is a review of our visit.  If our clinic scheduler is not able to reach you please call [phone] to schedule your next appointments.    -schedule psych eval  -schedule sleep consult  -schedule hematology consult  -continue topiramate  -start working with dietitian   -letter of support from primary care   -see Dr. Leyva in 1 month   -follow up with me in 3 months     Information about Video Visits with [employer]: video visit information  _________________________________________________________________________________________________________________________________________________________  If you are asked by your clinic team to have your blood pressure checked:  [facility] do offer several locations for blood pressure checks. Please follow the below link to schedule an appointment. Scheduling an appointment at the pharmacy for a blood pressure check is now preferred.    Appointment Plus (appointment-plus.[employer])  _________________________________________________________________________________________________________________________________________________________  Important contact and scheduling information:  Please call our contact center at [phone] to schedule your next appointments.  To find a lab location near you, please call [phone].  For any nursing " questions or concerns call Harika Betancourt LPN at [phone] or Filomena Quezada RN at [phone]  Please call during clinic hours Monday through Friday 8:00a - 4:00p if you have questions or you can contact us via Influxhart at anytime and we will reply during clinic hours.    Lab results will be communicated through My Chart or letter (if My Chart not used). Please call the clinic if you have not received communication after 1 week or if you have any questions.?  Clinic Fax: [phone]    _________________________________________________________________________________________________________________________________________________________  Meal Replacement Products:    Here is the link to our new e-store where you can purchase our meal replacement products    [facility] E-Store  [email or web address]/store    The one week starter kit is a great way to sample a variety of products and see what works for you.    If you want more information about the product go to: [employer]    If you are an employee or [facility] or  [facility] please contact your care team for a 10% estore discount    Free Shipping for orders over $75     Benefits of meal replacements products:    Portion and calorie control  Improved nutrition  Structured eating  Simplified food choices  Avoid contact with trigger foods  _________________________________________________________________________________________________________________________________________________________  Interested in working with a health ?  Health coaches work with you to improve your overall health and wellbeing.  They look at the whole person, and may involve discussion of different areas of life, including, but not limited to the four pillars of health (sleep, exercise, nutrition, and stress management). Discuss with your care team if you would like to start working a health .  Health Coaching-3 Pack:  Schedule by calling [phone]    $99 for three health coaching visits    Visits may be done in person or via phone    Coaching is a partnership between the  and the client; Coaches do not prescribe or diagnose    Coaching helps inspire the client to reach his/her personal goals   _________________________________________________________________________________________________________________________________________________________  24 Week Healthy Lifestyle Plan:    Our mission in the 24-week Healthy Lifestyle Plan is to provide you with individualized care by giving you the tools, education and support you need to lose weight and maintain a healthy lifestyle. In your 24-week journey, you ll be supported by a dedicated weight loss team that includes registered dietitians, medical weight management providers, health coaches, and nurses -- all with special expertise in weight loss -- to help you every step of the way.     Monthly meetings with your registered dietician or medical weight management provider help to review your progress, update your care plan, and make any adjustments needed to ensure success. Between these visits, weekly and bi-weekly health  visits will help you focus on the four pillars of weight loss -- stress, sleep, nutrition, and exercise -- and how you can best adapt each to achieve sustainable weight loss results.    In addition, you will be given exclusive access to online wellbeing classes through [employer].  Your initial visit will be with a medical weight management provider who will help to understand your weight loss goals and ensure this program is the right fit for you. Please let our team know if you are interested in the 24 week plan by sending a message to your care team or calling [phone] to  "schedule.  _________________________________________________________________________________________________________________________________________________________    COMPREHENSIVE WEIGHT MANAGEMENT PROGRAM  VIRTUAL SUPPORT GROUPS    For Support Group Information:      We offer support groups for patients who are working on weight loss and considering, preparing for or have had weight loss surgery.   There is no cost for this opportunity.  You are invited to attend the?Virtual Support Groups?provided by any of the following locations:    [facility] via Microsoft Teams with Dinorah Francisco RN  2.   [place] via [employer] with Miguelito Hammond, PhD, LP  3.   [place] via [employer] with Rebeka Birmingham RN  4.   [facility] via modu Teams with Rebeka Lu [facility]    The following Support Group information can also be found on our website:  [email or web address]    [facility] Weight Loss Surgery Support Group    [facility] Weight Loss Surgery Support Group  The support group is a patient-lead forum that meets monthly to share experiences, encouragement and education. It is open to those who have had weight loss surgery, are scheduled for surgery, and those who are considering surgery.   WHEN: This group meets on the 3rd Wednesday of each month from 5:00PM - 6:00PM virtually using Microsoft Teams.   FACILITATOR: Led by Dinorah Francisco, MARK, LD, RN, the program's Clinical Coordinator.   TO REGISTER: Please contact the clinic via Ag[employer] or call the nurse line directly at [phone] to inform our staff that you would like an invite sent to you and to let us know the email you would like the invite sent to. Prior to the meeting, a link with directions on how to join the meeting will be sent to you.    2022 Meetings  Jessica 15: \"Let's Talk\" a time for the group to share.  July 20: \"Let's Talk\" a time for the group to " "share.  August 17: \"Let's Talk\" a time for the group to share.  September 21: \"Let's Talk\" a time for the group to share.  October 19: Guest Speaker: Dr Jordan Mccarthy MD Pulmonologist and Sleep Medicine Physician, \"Getting a Good Night's Sleep\".  November 16: \"Let's Talk\" a time for the group to share.  December 21: \"Let's Talk\" a time for the group to share.    [facility] Support Groups    Connections: Bariatric Care Support Group?  This is open to all [facility] (and those external to this program) pre- and post- operative bariatric surgery patients as well as their support system.   WHEN: This group meets the 2nd Tuesday of each month from 6:30 PM - 8:00 PM virtually using Microsoft Teams.   FACILITATOR: Led by Miguelito Hammond, Ph.D who is a Licensed Psychologist with the [facility] Comprehensive Weight Management Program.   TO REGISTER: Please send an email to Miguelito Hammond, Ph.D., LP at?[email or web address]?if you would like an invitation to the group and to learn about using Microsoft Teams.    2022 Meetings  June 14: Connie Stout RD, LD at [facility], \"Nutritional Labeling\"  July 12 August 2 (Please Note Date Change)  September 13 October 11 November 8 December 13    Connections: Post-Operative Bariatric Surgery Support Group  This is a support group for [facility] bariatric patients (and those external to [facility]) who have had bariatric surgery and are at least 3 months post-surgery.  WHEN: This support group meets the 4th Wednesday of the month from 11:00 AM - 12:00 PM virtually using Microsoft Teams.   FACILITATOR: Led by Certified Bariatric Nurse, Rebeka Birmingham RN.   TO REGISTER: Please send an email to Rebeka at [email or web address] if you would like an invitation to the group and to learn about using Microsoft Teams.    2022 Meetings June 22 July 27 August 24 September 28 October 26 November 23 December " "28      [facility] Healthy Lifestyle Virtual Support Group    Healthy Lifestyle Virtual Support Group?  This is 60 minutes of small group guided discussion, support and resources. All are welcome who want a healthy lifestyle.  WHEN: This group meets monthly on a Friday from 12:30 PM - 1:30 PM virtually using Microsoft Teams.   FACILITATOR: Led by National Board Certified Health and , Rebeka Lu [facility].   TO REGISTER: Please send an email to Rebeka [email or web address] to receive monthly invites to the group or if you have any questions about having a health .  Prior to the meeting, a link with directions on how to join the meeting will be sent to you.    2022 Meetings  June 24: Rebeka Lu [facility], \"Setting Limits and Boundaries\".  Jul 29: Open Forum  August 26: Guest Speaker: Chio Montez Registered Dietitian  September 30: Open Forum  October 28th: Guest Speaker: Madeleine Small [facility], Health , \"Gratitude Practices\".  November 18: Guest Speaker: Risa Holguin RD Registered Dietitian, \"Navigating How to Eat around the Holidays\".  December 16: Guest Speaker: Mary Flowers [facility], \"Changing Your Relationship with Movement\".    ____________________________________________________________________________________________________________________________________________________________________________  [facility] Get Moving Program  Our team of physical therapists is trained to help you understand and take control of your condition. They will perform a thorough evaluation to determine your ability for activity and develop a customized plan to fit your goals and physical ability.  Scheduling: Unsure if the Get Moving program is right for you? Discuss the program with your medical provider or diabetes educator. You can also call us at [phone] to ask questions or schedule an appointment.   [facility] Get Moving " Program  ____________________________________________________________________________________________________________________________________________________________________________  [facility] Diabetes Prevention Program (DPP)  If you have prediabetes and Medicare please contact us via MobileProhart to learn more about the Diabetes Prevention Program (DPP)  Program Details:  [facility] offers the year-long Diabetes Prevention Program (DPP). The program helps you to make lifestyle changes that prevent or delay type 2 diabetes by supporting healthy eating, increased physical activity, stress reduction and use of coping skills.   On average, previous [facility] DPP cohorts have lost and maintained at least 5% of their starting weight throughout the program and averaged more than 150 minutes of physical activity per week.  Participants meet weekly for one-hour group sessions over sixteen weeks, every other week for the next 8 weeks, and monthly for the last six months.   A year-long maintenance program is also available for participants who complete the first year.   Location & Cost:   During the COVID-19 Public Health Emergency, the program is offered virtually. When in-person classes can resume, they will be held at [facility].  For people with Medicare, the program is covered in full. A self-pay option will also be available for those with non-Medicare insurance plans.   _________________________________________________________________________________________________________________________________________________________  _____________________________________________________    To work with a Behavioral Health Psychologist:    Call to schedule:    [place] - [phone]  [facility] - [phone]  Ayala Rivas - [phone]  Nani Sheppard - [phone]   Maisha Hogue PhD (cannot accept Medicare) [phone]        Thank you,   [facility]  Comprehensive Weight Management Team  Bariatric Task List    Fax:  Please fax all paperwork to:   -     Status:  Is patient a candidate for bariatric surgery?:    -     Cleared to schedule surgeon consult?:    -     Status:    -     Surgeon:   -     Tentative surgery month/year:   -        Insurance: Insurance:    -      Contact insurance to discuss coverage:   -       Cigna: PCP Recommendation and Medical Clearance:    -     HP Referral:    -      Advanced beneficiary notification (ABN) for Medicare patients for RD visits   and surgery:   -      Weight history:   -     Other:    -        Patient Info: Initial Weight:    -     Date of Initial Weight/Height:    -     Goal Weight (lbs):    -     Required Weight Loss:    -     Surgery Type:    -     Multidisciplinary Meeting:    -        Dietician Visits: Structured weight loss required by insurance?:    -     Dietician Visit 1:    -     Dietician Visit 2:    -     Dietician Visit 3:    -     Dietician Visit 4:    -     Dietician Visit 5:    -     Dietician Visit 6:    -     Dietician Visit additional:    -     Clearance from dietician to see surgeon?:    -     Dietician Notes:    -        Psychological Evaluation: Psych eval:    -     Therapist letter of support:    -     Psychiatrist letter of support:    -     Establish care with therapist:    -     Complete eating disorder evaluation:    -     Letter of clearance from therapist/eating disorder program:    -     Other:    -        Lab Work: Complete Blood Count:    -     Comprehensive Metabolic Panel:    -     Vitamin D:    -     PTH:    -     Hgb A1c:    -      Lipids:   -      TSH ([facility], [facility], [place] MA):   -       Ferritin:   -       Folate:   -       Testosterone, Total and Free:   -     Thiamine:   -     Vitamin A:   -     Vitamin B12:   -     Zinc:   -     C-peptide:   -     H. pylori:    -     MRSA (2 swabs, minimum 48 hours apart):   -     Nicotine Testing:    -     Recheck Vitamin D:   -     Other:    -       "  Consults/ Clearance Sleep Medicine:    -     Cardiac:    -     Pain:   -     Dental:    -     Endocrine:    -     Gastroenterology:    -     Vascular Medicine:    -     Hematology:    -     Medical Weight Management:   -     Physical Therapy/Exercise:    -     Nephrology:    -     Neurology:    -     Pulmonology:    -     Rheumatology:    -     Other:    -     Other:    -     Other:    -        Testing: UGI:    -     EGD:    -     Sleep Study:   -     Other:   -     Other:    -        PCP: Establish care with PCP:    -     Follow up with PCP:    -     PCP letter of support:    -        Stopping Smoking/ Alcohol Use: Quit tobacco use (3 months smoke free)?:    -     Quit date:    -     Quit alcohol use:   -     Quit date:   -     Other:   -     Quit date:   -        Patient Education:  Information Session:    -     Given \"Making your decision\" handout?:    -     Given \"A Roadmap to you Weight Loss Surgery\" handout?:   -     Given \"Get Well Loop\" information?:   -     Given support group information?:    -     Attended support group?:    -     Support plan in place?:    -     Research consents signed?:    -     Avoid NSAIDS/ Alternate Plan for Pain:   -        Additional Surgery Requirements: Review Coag plan:    -     HgA1c <8:    -     Inpatient pain consult:    -     Final nicotine screen:    -     Dental work complete:    -     Birth control plan:    -     Gallstone prevention plan (Actigall for 6 months postop):   -     Other:   -     Other:   -        Final Tasks:  Before surgery online class:    -     Before surgery online class website link:  [email or web address]   After surgery online class:    -     After surgery online class website link:  [email or web address]   Nurse visit per clinic:    -     History and Physical per clinic:   -     Final labs per clinic:   -     Chest xray per clinic:   -     Electrocardiogram (ECG) per clinic:   -     Other:   -        Notes:   -   "

## 2023-02-08 NOTE — LETTER
"2023       RE: Thelma Uribe  [address]  [facility] [address]     Dear Colleague,    Thank you for referring your patient, Thelma Uribe, to the [facility] [place] [facility]. Please see a copy of my visit note below.    New Bariatric Surgery Consultation Note    2023    RE: Thelma Uribe  MR#: [id number]  : 1965      Referring provider: No flowsheet data found.    Chief Complaint/Reason for visit: evaluation for possible weight loss surgery    Dear Tiana Macdonald PA-C (General),    I had the pleasure of seeing your patient, Thelma Uribe, to evaluate her obesity and consider her for possible weight loss surgery.  As you know, Thelma Uribe is 57 year old.  She has a height of 5' 4\"[pt reported[, a weight of 238 lbs 9.6 oz, and calculated Body mass index is 40.96 kg/m ..  Full intake/assessment was done to determine barriers to weight loss success and develop a treatment plan.    Assessment & Plan   Problem List Items Addressed This Visit        Digestive    Class 3 severe obesity with serious comorbidity and body mass index (BMI) of 40.0 to 44.9 in adult (H) - Primary     Adult onset obesity with numerous weight loss attempts over the years. Met with Dr. Graham in  and tried qsymia with adverse effects. Has been taking topiramate with primary care and working on dietary and lifestyle changes since 2022 and has lost 8lb (more on her own scale). She would like to consider bariatric surgery for more durable weight management given complicated lower extremity injuries which significantly impacts her tolerance to ambulate and exercise the way she would like. comorbidities include dyslipidemia, LETY, post thyroidectomy hypothyroidism, PCOS and infertility.     Personal history of hemorrhage after several past surgeries. Sister has Terrancellkimand and " father has clotting factor disorder. Seen by hematology after hemorrhage inpatient after ortho surgery but had never followed up once she was healthy. Would recommend hematology clearance prior to surgery.     LETY well controlled with CPAP. No recent follow up. Would recommend clearance/ review of CPAP compliance prior to surgery.     Reflux is non-existent. Was stressed induced years ago, EGD in 2012 is normal. EGD done for dysphagia which was secondary to thyroid enlargement.      We did discuss role of AOM and medication options. Discussed long term use of AOM for weight management. Family history of pancreatitic malignancy in mother and sister. Understandably hesitant about use of GLP1 for long term management. Discussed not absolute contraindication but understandable hesitation.       Plan:   -schedule psych eval  -schedule sleep consult  -schedule hematology consult  -labs today   -continue topiramate 25mg bid   -start working with dietitian   -letter of support from primary care   -see Dr. Leyva in 1 month   -follow up with me in 3 months          Relevant Orders    Adult Sleep Eval & Management  Referral    Adult Mental Health  Referral    Adult Oncology/Hematology  Referral    Comprehensive metabolic panel    Hemoglobin A1c    Parathyroid Hormone Intact    Vitamin D Deficiency   Other Visit Diagnoses     LETY (obstructive sleep apnea)        Relevant Orders    Adult Sleep Eval & Management  Referral    Adult Mental Health  Referral    Adult Oncology/Hematology  Referral    Family history of bleeding or clotting disorder        Relevant Orders    Adult Oncology/Hematology  Referral           Wt Readings from Last 5 Encounters:   02/08/23 108.2 kg (238 lb 9.6 oz)   10/13/22 111.8 kg (246 lb 8 oz)   07/20/22 111.1 kg (245 lb)   08/30/21 111.1 kg (245 lb)   04/13/21 111.9 kg (246 lb 9.6 oz)        HISTORY OF PRESENT ILLNESS:  Weight Loss History  Reviewed with Patient 2/6/2023   How long have you been overweight? Since late 20's to early 40's   What is the most that you have ever weighed? 250   What is the most weight you have lost? 25   I have tried the following methods to lose weight Watching portions or calories, Exercise, Weight Watchers, Pre packaged meals ex: Nutrisystem, Slimfast, Prescription Medications, Physician directed program   I have tried the following weight loss medications? (Check all that apply) Topamax/Topiramate, Qysmia (phentermine + topiramate), Metformin   Have you ever had weight loss surgery? No     Always struggled with weight. High weight in life December 2022 250lb.     New MWM 8/2021 Dr. Graham, started qsymia - 10/25/21 Lauren Bloch MTM Pharmacist  - side effects: dry mouth, insomnia, constipation     topiramate 25mg bid with PCP     CO-MORBIDITIES OF OBESITY INCLUDE:     2/6/2023   I have the following health issues associated with obesity: High Cholesterol, Sleep Apnea, Polycystic Ovarian Syndrome, Infertility, Fatty Liver, Osteoarthritis (joint disease), Hypothyroidism   I have the following symptoms associated with obesity: -     Reflux- EGD 3/2012 [facility]- dilation for dysphagia at that time (ended up being thyroid). Otherwise normal exam and bx.  Suggested alternative PPI given omeprazole was not beneficial.     PCOS      LETY well controlled with CPAP     Dyslipidemia- myalgia with statin     PAST MEDICAL HISTORY:  Past Medical History:   Diagnosis Date     Dysthymic disorder     much improved, took celexa x 1mo in '06     Elevated blood pressure reading without diagnosis of hypertension     in '10, much improved with diet/exercise     Exercise induced bronchospasm     takes albuterol very rarely     Female infertility of other specified origin     stopped trying     GERD (gastroesophageal reflux disease)     prilosec for awhile, stable off for yrs (2/16)     History of blood transfusion 12/19     hospitalization, 3 units     Thyroid disease        PAST SURGICAL HISTORY:  Past Surgical History:   Procedure Laterality Date     BACK SURGERY      lumbar discectomy     COLONOSCOPY  2018     COLONOSCOPY WITH CO2 INSUFFLATION N/A 2016    Procedure: COLONOSCOPY WITH CO2 INSUFFLATION;  Surgeon: Miguelito Erazo MD;  Location:  OR     GYN SURGERY       HC REMOVE TONSILS/ADENOIDS,12+ Y/O  Age 14     left navicular fracture   2019     ORIF right tibial plateau fracture Right 2019     ORTHOPEDIC SURGERY       percutaneous pinning of foot Left 2019     right knee external fixator Right 2019     right leg fasciotomy with wound vac placement  2019     SOFT TISSUE SURGERY      right leg fasciotomy     THYROIDECTOMY  2012    Procedure:THYROIDECTOMY; LEFT THYROID LOBECTOMY; Surgeon:STEPHANIA FERNÁNDEZ; Location: OR     UPPER GI ENDOSCOPY  2012    bx's okay, rec different PPI     ZZC APPENDECTOMY  Age 11     Z NONSPECIFIC PROCEDURE      Right oophorectomy and dermoid cyst removal     Z NONSPECIFIC PROCEDURE      Partial Left oophorectomy and dermoid cyst removal     [facility] NONSPECIFIC PROCEDURE      L 4-5 herniated disc w/ laminectomy     Z NONSPECIFIC PROCEDURE  2003    Midcarpal Instability-Right wrist reconstruction     Z NONSPECIFIC PROCEDURE      Left breast biopsy-benign     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 Hemorrhage after oophrectomy and foot surgery     Sister has clotting disorder   Father has von willbrand   Seen in hospital with hematology   Will follow up     FAMILY HISTORY:   Family History   Problem Relation Age of Onset     Cancer Mother 89        Pancreatic     Hypertension Mother      Arthritis Mother      Lipids Mother      Osteoporosis Mother         on meds, did have metarsal fx  from slip     Thyroid Disease Mother         Hypothyroid     Coronary Artery Disease Mother         cardiac stents     Other Cancer Mother         pancreatic     C.A.D. Father 65        Quadruple bypass- age 65     Hypertension Father      Prostate Cancer Father      Blood Disease Father         Anti-cardiolipin syndrome, passed away from aneursym      Lipids Father      Thyroid Disease Father         Hypothyroid     Coronary Artery Disease Father         quad bypass     Hyperlipidemia Father      Diabetes Maternal Grandmother         Type 2     Diabetes Paternal Grandfather      Coronary Artery Disease Brother 54        Stent at 54 yrs     Other Cancer Brother         kidney cancer     Coronary Artery Disease Brother         quad bypass     Other Cancer Brother         kidney     Kidney Cancer Brother 49        smoker     Blood Disease Sister         Vonwillenbrand Syndrome     Thyroid Disease Sister      Cancer Sister 56         of pancreatic cancer w/in 3 mo of dx     Other Cancer Sister         pancreatic     Thyroid Disease Sister      Cerebrovascular Disease No family hx of      Breast Cancer No family hx of      Colon Cancer No family hx of      Depression No family hx of      Anxiety Disorder No family hx of      Mental Illness No family hx of      Substance Abuse No family hx of      Anesthesia Reaction No family hx of      Asthma No family hx of      Genetic Disorder No family hx of      Obesity No family hx of      Unknown/Adopted No family hx of        SOCIAL HISTORY:   Social History Questions Reviewed With Patient 2023   Which best describes your employment status (select all that apply) I work full-time   If you work, 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 No   Who do you have in your support network that can be available to help you for the first 2 weeks after surgery? wife, sisters, friends, other  family   Who can you count on for support throughout your weight loss surgery journey? wife, sisters, friends   Can you afford 3 meals a day?  Yes   Can you afford 50-60 dollars a month for vitamins? 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 Monthly or less   If you do drink alcohol, how many drinks might you have in a day? (one drink = 5 oz. wine, 1 can/bottle of beer, 1 shot liquor) -   Have you ever used any of the following nicotine products? No   Have you or are you currently using street drugs or prescription strength medication for which you do not have a prescription for? No   Do you have a history of chemical dependency (alcohol or drug abuse)? No     Currently taking narcotic/opioids No    PSYCHOLOGICAL HISTORY:   Psychological History Reviewed With Patient 2/6/2023   Have you ever attempted suicide? Never.   Have you had thoughts of suicide in the past year? No   Have you ever been hospitalized for mental illness or a suicide attempt? Never.   Do you have a history of chronic pain? No   Have you ever been diagnosed with fibromyalgia? No   Are you currently seeing a therapist or counselor?  No   Are you currently seeing a psychiatrist? No       ROS:     2/6/2023   Skin:  None of the above   HEENT: None of these   Musculoskeletal: Joint Pain, Limited mobility   Cardiovascular: None of the above   Pulmonary: Snoring   Gastrointestinal: None of the above   Genitourinary: None of the above   Hematological: None of the above   Neurological: None of the above   Female only: Post-menopausal       EATING BEHAVIORS:     2/6/2023   Have you or anyone else thought that you had an eating disorder? No   Do you currently binge eat (eat a large amount of food in a short time)? No   Are you an emotional eater? Yes   Do you get up to eat after falling asleep? No       EXERCISE:     2/6/2023   How often do you exercise? 1 to 2 times  per week   What is the duration of your exercise (in minutes)? 45 Minutes   What types of exercise do you do? swimming, home gym, weightlifting   What keeps you from being more active?  I am as active as I can possbily be, I have just had surgery on one or more of my joints     Bilateral leg fractures in 2020   Swims a couple days a week   Can not walk more than 6,000 steps daily   Upper body weight training     MEDICATIONS:  Current Outpatient Medications   Medication Sig Dispense Refill     Multiple Vitamin (THERAPEUTIC MULTIVITAMIN PO)        naproxen sodium (ANAPROX) 220 MG tablet Take 220 mg by mouth 2 times daily as needed for moderate pain       SYNTHROID 112 MCG tablet Take 1 tablet (112 mcg) by mouth daily 90 tablet 3     topiramate (TOPAMAX) 25 MG tablet Take 1 tablet (25 mg) by mouth 2 times daily 180 tablet 1       Is patient on biologics or immunomodulators?     ALLERGIES:  Allergies   Allergen Reactions     Hydrocodone-Acetaminophen Hives, Itching and Nausea and Vomiting     Oxycodone Hives, Itching, Nausea and Vomiting and Rash       Office Visit on 10/13/2022   Component Date Value Ref Range Status     Sodium 10/13/2022 138  133 - 144 mmol/L Final     Potassium 10/13/2022 4.5  3.4 - 5.3 mmol/L Final     Chloride 10/13/2022 104  94 - 109 mmol/L Final     Carbon Dioxide (CO2) 10/13/2022 31  20 - 32 mmol/L Final     Anion Gap 10/13/2022 3  3 - 14 mmol/L Final     Urea Nitrogen 10/13/2022 23  7 - 30 mg/dL Final     Creatinine 10/13/2022 0.80  0.52 - 1.04 mg/dL Final     Calcium 10/13/2022 9.5  8.5 - 10.1 mg/dL Final     Glucose 10/13/2022 99  70 - 99 mg/dL Final     GFR Estimate 10/13/2022 85  >60 mL/min/1.73m2 Final    Effective December 21, 2021 eGFRcr in adults is calculated using the 2021 CKD-EPI creatinine equation which includes age and gender (Tony et al., NE, DOI: 10.1056/EYXQhy2909578)     WBC Count 10/13/2022 5.5  4.0 - 11.0 10e3/uL Final     RBC Count 10/13/2022 5.23 (H)  3.80 - 5.20  10e6/uL Final     Hemoglobin 10/13/2022 14.8  11.7 - 15.7 g/dL Final     Hematocrit 10/13/2022 45.7  35.0 - 47.0 % Final     MCV 10/13/2022 87  78 - 100 fL Final     MCH 10/13/2022 28.3  26.5 - 33.0 pg Final     MCHC 10/13/2022 32.4  31.5 - 36.5 g/dL Final     RDW 10/13/2022 12.4  10.0 - 15.0 % Final     Platelet Count 10/13/2022 180  150 - 450 10e3/uL Final     % Neutrophils 10/13/2022 66  % Final     % Lymphocytes 10/13/2022 24  % Final     % Monocytes 10/13/2022 7  % Final     % Eosinophils 10/13/2022 3  % Final     % Basophils 10/13/2022 0  % Final     % Immature Granulocytes 10/13/2022 0  % Final     Absolute Neutrophils 10/13/2022 3.6  1.6 - 8.3 10e3/uL Final     Absolute Lymphocytes 10/13/2022 1.3  0.8 - 5.3 10e3/uL Final     Absolute Monocytes 10/13/2022 0.4  0.0 - 1.3 10e3/uL Final     Absolute Eosinophils 10/13/2022 0.1  0.0 - 0.7 10e3/uL Final     Absolute Basophils 10/13/2022 0.0  0.0 - 0.2 10e3/uL Final     Absolute Immature Granulocytes 10/13/2022 0.0  <=0.4 10e3/uL Final       Fib4 0.86 (CMP from 7/2022)      Anti-obesity medication ROS:    Cardiovascular  CAD:Yes  HTN:No    Gastrointestinal  GERD:historically but not now   Liver Dz:No  Computed FIB-4 Calculation unavailable. Necessary lab results were not found in the last year.    H/O Pancreatitis:no, but family hx of pancreatitc cancer in two family members    Psychiatric  Bipolar: No  Anxiety:No  Depression:No  History of alcohol/drug abuse: No  Hx of eating disorder:No    Endocrine  Personal or family hx of MTC or MEN2:no but had viral infarct of thyroid with tyroidectomy  Diabetes/prediabetes: No    Neurologic:  Chronic pain/opioids use: No      History of kidney stones: No  Kidney disease: No  Current birth control: No      PHYSICAL EXAM:  Objective     Physical Exam   GENERAL: Healthy, alert and no distress  EYES: Eyes grossly normal to inspection.  No discharge or erythema, or obvious scleral/conjunctival abnormalities.  RESP: No audible  wheeze, cough, or visible cyanosis.  No visible retractions or increased work of breathing.    SKIN: Visible skin clear. No significant rash, abnormal pigmentation or lesions.  NEURO: Cranial nerves grossly intact.  Mentation and speech appropriate for age.  PSYCH: Mentation appears normal, affect normal/bright, judgement and insight intact, normal speech and appearance well-groomed.       In summary, Thelma Uribe has Class III obesity with a body mass index of Body mass index is 40.96 kg/m . kg/m2 and the comorbidities stated above. She completed an informational seminar and is a possible candidate for the laparoscopic gastric sleeve.  She will have to complete the following pre-requisites:    Today in the office we discussed gastric sleeve surgery. Preoperative, perioperative, and postoperative processes, management, and follow up were addressed.  Risks and benefits were outlined including the risk of death, staple line leak (1-2%), PE, DVT, ulcer, worsening GERD, N/V, stricture, hernia, wound infection, weight regain, and vitamin deficiencies. I emphasized exercise and activity along with appropriate food choice as the main foundation for weight loss with surgery providing surgical reinforcement of this.  All questions were answered.  A goal sheet and support group handout were given to the patient.      If you have not already watched our online seminar please go to [email or web address]    Weight loss requirement: 10 prior to surgery. Will have final weight check 2-3 weeks prior to surgery at anesthesia or nurse pre-op teaching visit.  GW 235lb  -Need current weight confirmation at primary clinic or weight management clinic No    Bariatric labs ordered, call for a lab only appointment at any [facility] lab. To find a lab location near you, please call [phone]. Please let us know if orders need to be faxed to a non [facility] lab.    Schedule bariatric psych eval as soon as  possible.  List of psychologists will be sent to you via [employer] or given to you in clinic.     Call Delta Lau at [phone] to discuss insurance coverage for bariatric surgery.  Please check with your insurance regarding bariatric surgery coverage also. Delta can also help you with scheduling psych eval if you are having difficulties.    The following clearance letters are needed: Letters will be sent to you via [employer] or given to you in clinic  - Letter of support from primary care provider. Provider can submit through electronic medical record or fax to [phone]  - hematology, sleep medicine, psych, primary care  Smoking cessation and nicotine test needed: No    Birth control after surgery discussed. Patient instructed that 2 forms of birth control required after surgery and to avoid pregnancy for at least 18 months after surgery: N/A    NEXT VISITS: A  should reach out to you to schedule the following appointments.  If they do not reach you please call [phone] to schedule the following appointments:        Once the patient has completed the requirements in their task list and there are no further recommendations, the pt will be allowed to see the surgeon of her choice for consultation on the laparoscopic gastric sleeve surgery. Patient verbalizes understanding of the process to surgery and expectations for the postoperative period including the need for lifelong lifestyle changes, vitamin supplementation, and laboratory monitoring.    Sincerely,     Charis Fuller NP      75 minutes spent on the date of the encounter doing chart review, history and exam, documentation and further activities per the note

## 2023-02-08 NOTE — Clinical Note
NBS. Tasklist done. Forgot to send packet and letters with patient. Please send. Classes are scheduled.   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 I'm afriad a consult is going to make her redo sleep study which I do not believe she needs. She needs clearance that things are well controlled. Told her to reach out if she has trouble on that end.

## 2023-02-08 NOTE — PROGRESS NOTES
"New Bariatric Surgery Consultation Note    2023    RE: Thelma Uribe  MR#: [id number]  : 1965      Referring provider: No flowsheet data found.    Chief Complaint/Reason for visit: evaluation for possible weight loss surgery    Dear Tiana Macdonald PA-C (General),    I had the pleasure of seeing your patient, Thelma Uribe, to evaluate her obesity and consider her for possible weight loss surgery.  As you know, Thelma Uribe is 57 year old.  She has a height of 5' 4\"[pt reported[, a weight of 238 lbs 9.6 oz, and calculated Body mass index is 40.96 kg/m ..  Full intake/assessment was done to determine barriers to weight loss success and develop a treatment plan.    Assessment & Plan   Problem List Items Addressed This Visit        Digestive    Class 3 severe obesity with serious comorbidity and body mass index (BMI) of 40.0 to 44.9 in adult (H) - Primary     Adult onset obesity with numerous weight loss attempts over the years. Met with Dr. Graham in  and tried qsymia with adverse effects. Has been taking topiramate with primary care and working on dietary and lifestyle changes since 2022 and has lost 8lb (more on her own scale). She would like to consider bariatric surgery for more durable weight management given complicated lower extremity injuries which significantly impacts her tolerance to ambulate and exercise the way she would like. comorbidities include dyslipidemia, LETY, post thyroidectomy hypothyroidism, PCOS and infertility.     Personal history of hemorrhage after several past surgeries. Sister has VonWillibrand and father has clotting factor disorder. Seen by hematology after hemorrhage inpatient after ortho surgery but had never followed up once she was healthy. Would recommend hematology clearance prior to surgery.     LETY well controlled with CPAP. No recent follow up. Would recommend clearance/ review of CPAP compliance prior to surgery. "     Reflux is non-existent. Was stressed induced years ago, EGD in 2012 is normal. EGD done for dysphagia which was secondary to thyroid enlargement.      We did discuss role of AOM and medication options. Discussed long term use of AOM for weight management. Family history of pancreatitic malignancy in mother and sister. Understandably hesitant about use of GLP1 for long term management. Discussed not absolute contraindication but understandable hesitation.       Plan:   -schedule psych eval  -schedule sleep consult  -schedule hematology consult  -labs today   -continue topiramate 25mg bid   -start working with dietitian   -letter of support from primary care   -see Dr. Leyva in 1 month   -follow up with me in 3 months          Relevant Orders    Adult Sleep Eval & Management  Referral    Adult Mental Health  Referral    Adult Oncology/Hematology  Referral    Comprehensive metabolic panel    Hemoglobin A1c    Parathyroid Hormone Intact    Vitamin D Deficiency   Other Visit Diagnoses     LETY (obstructive sleep apnea)        Relevant Orders    Adult Sleep Eval & Management  Referral    Adult Mental Health  Referral    Adult Oncology/Hematology  Referral    Family history of bleeding or clotting disorder        Relevant Orders    Adult Oncology/Hematology  Referral           Wt Readings from Last 5 Encounters:   02/08/23 108.2 kg (238 lb 9.6 oz)   10/13/22 111.8 kg (246 lb 8 oz)   07/20/22 111.1 kg (245 lb)   08/30/21 111.1 kg (245 lb)   04/13/21 111.9 kg (246 lb 9.6 oz)        HISTORY OF PRESENT ILLNESS:  Weight Loss History Reviewed with Patient 2/6/2023   How long have you been overweight? Since late 20's to early 40's   What is the most that you have ever weighed? 250   What is the most weight you have lost? 25   I have tried the following methods to lose weight Watching portions or calories, Exercise, Weight Watchers, Pre packaged meals ex:  Nutrisystem, Slimfast, Prescription Medications, Physician directed program   I have tried the following weight loss medications? (Check all that apply) Topamax/Topiramate, Qysmia (phentermine + topiramate), Metformin   Have you ever had weight loss surgery? No     Always struggled with weight. High weight in life December 2022 250lb.     New MWM 8/2021 Dr. Graham, started qsymia - 10/25/21 Lauren Bloch MTM Pharmacist  - side effects: dry mouth, insomnia, constipation     topiramate 25mg bid with PCP     CO-MORBIDITIES OF OBESITY INCLUDE:     2/6/2023   I have the following health issues associated with obesity: High Cholesterol, Sleep Apnea, Polycystic Ovarian Syndrome, Infertility,Osteoarthritis (joint disease), Hypothyroidism   I have the following symptoms associated with obesity: -     Reflux- EGD 3/2012 [facility]- dilation for dysphagia at that time (ended up being thyroid). Otherwise normal exam and bx.  Suggested alternative PPI given omeprazole was not beneficial.     PCOS      LETY well controlled with CPAP     Dyslipidemia- myalgia with statin     PAST MEDICAL HISTORY:  Past Medical History:   Diagnosis Date     Dysthymic disorder     much improved, took celexa x 1mo in '06     Elevated blood pressure reading without diagnosis of hypertension     in '10, much improved with diet/exercise     Exercise induced bronchospasm     takes albuterol very rarely     Female infertility of other specified origin     stopped trying     GERD (gastroesophageal reflux disease)     prilosec for awhile, stable off for yrs (2/16)     History of blood transfusion 12/19    hospitalization, 3 units     Thyroid disease        PAST SURGICAL HISTORY:  Past Surgical History:   Procedure Laterality Date     BACK SURGERY  2001    lumbar discectomy     COLONOSCOPY  2018     COLONOSCOPY WITH CO2 INSUFFLATION N/A 06/20/2016    Procedure: COLONOSCOPY WITH CO2 INSUFFLATION;  Surgeon: Miguelito Erazo MD;  Location:   OR     GYN SURGERY       HC REMOVE TONSILS/ADENOIDS,12+ Y/O  Age 14     left navicular fracture   2019     ORIF right tibial plateau fracture Right 2019     ORTHOPEDIC SURGERY       percutaneous pinning of foot Left 2019     right knee external fixator Right 2019     right leg fasciotomy with wound vac placement  2019     SOFT TISSUE SURGERY      right leg fasciotomy     THYROIDECTOMY  2012    Procedure:THYROIDECTOMY; LEFT THYROID LOBECTOMY; Surgeon:STEPHANIA FERNÁNDEZ; Location: OR     UPPER GI ENDOSCOPY  2012    bx's okay, rec different PPI     ZZC APPENDECTOMY  Age 11     Z NONSPECIFIC PROCEDURE      Right oophorectomy and dermoid cyst removal     [facility] NONSPECIFIC PROCEDURE      Partial Left oophorectomy and dermoid cyst removal     [facility] NONSPECIFIC PROCEDURE      L 4-5 herniated disc w/ laminectomy     [facility] NONSPECIFIC PROCEDURE  2003    Midcarpal Instability-Right wrist reconstruction     [facility] NONSPECIFIC PROCEDURE      Left breast biopsy-benign     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 Hemorrhage after oophrectomy and foot surgery     Sister has clotting disorder   Father has von willbrand   Seen in hospital with hematology   Will follow up     FAMILY HISTORY:   Family History   Problem Relation Age of Onset     Cancer Mother 89        Pancreatic     Hypertension Mother      Arthritis Mother      Lipids Mother      Osteoporosis Mother         on meds, did have metarsal fx from slip     Thyroid Disease Mother         Hypothyroid     Coronary Artery Disease Mother         cardiac stents     Other Cancer Mother         pancreatic     C.A.D. Father 65        Quadruple bypass- age 65     Hypertension Father      Prostate Cancer Father      Blood Disease Father         Anti-cardiolipin syndrome, passed away from  aneursym      Lipids Father      Thyroid Disease Father         Hypothyroid     Coronary Artery Disease Father         quad bypass     Hyperlipidemia Father      Diabetes Maternal Grandmother         Type 2     Diabetes Paternal Grandfather      Coronary Artery Disease Brother 54        Stent at 54 yrs     Other Cancer Brother         kidney cancer     Coronary Artery Disease Brother         quad bypass     Other Cancer Brother         kidney     Kidney Cancer Brother 49        smoker     Blood Disease Sister         Vonwillenbrand Syndrome     Thyroid Disease Sister      Cancer Sister 56         of pancreatic cancer w/in 3 mo of dx     Other Cancer Sister         pancreatic     Thyroid Disease Sister      Cerebrovascular Disease No family hx of      Breast Cancer No family hx of      Colon Cancer No family hx of      Depression No family hx of      Anxiety Disorder No family hx of      Mental Illness No family hx of      Substance Abuse No family hx of      Anesthesia Reaction No family hx of      Asthma No family hx of      Genetic Disorder No family hx of      Obesity No family hx of      Unknown/Adopted No family hx of        SOCIAL HISTORY:   Social History Questions Reviewed With Patient 2023   Which best describes your employment status (select all that apply) I work full-time   If you work, 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 No   Who do you have in your support network that can be available to help you for the first 2 weeks after surgery? wife, sisters, friends, other family   Who can you count on for support throughout your weight loss surgery journey? wife, sisters, friends   Can you afford 3 meals a day?  Yes   Can you afford 50-60 dollars a month for vitamins? 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 Monthly  or less   If you do drink alcohol, how many drinks might you have in a day? (one drink = 5 oz. wine, 1 can/bottle of beer, 1 shot liquor) -   Have you ever used any of the following nicotine products? No   Have you or are you currently using street drugs or prescription strength medication for which you do not have a prescription for? No   Do you have a history of chemical dependency (alcohol or drug abuse)? No     Currently taking narcotic/opioids No    PSYCHOLOGICAL HISTORY:   Psychological History Reviewed With Patient 2/6/2023   Have you ever attempted suicide? Never.   Have you had thoughts of suicide in the past year? No   Have you ever been hospitalized for mental illness or a suicide attempt? Never.   Do you have a history of chronic pain? No   Have you ever been diagnosed with fibromyalgia? No   Are you currently seeing a therapist or counselor?  No   Are you currently seeing a psychiatrist? No       ROS:     2/6/2023   Skin:  None of the above   HEENT: None of these   Musculoskeletal: Joint Pain, Limited mobility   Cardiovascular: None of the above   Pulmonary: Snoring   Gastrointestinal: None of the above   Genitourinary: None of the above   Hematological: None of the above   Neurological: None of the above   Female only: Post-menopausal       EATING BEHAVIORS:     2/6/2023   Have you or anyone else thought that you had an eating disorder? No   Do you currently binge eat (eat a large amount of food in a short time)? No   Are you an emotional eater? Yes   Do you get up to eat after falling asleep? No       EXERCISE:     2/6/2023   How often do you exercise? 1 to 2 times per week   What is the duration of your exercise (in minutes)? 45 Minutes   What types of exercise do you do? swimming, home gym, weightlifting   What keeps you from being more active?  I am as active as I can possbily be, I have just had surgery on one or more of my joints     Bilateral leg fractures in 2020   Swims a couple days a week    Can not walk more than 6,000 steps daily   Upper body weight training     MEDICATIONS:  Current Outpatient Medications   Medication Sig Dispense Refill     Multiple Vitamin (THERAPEUTIC MULTIVITAMIN PO)        naproxen sodium (ANAPROX) 220 MG tablet Take 220 mg by mouth 2 times daily as needed for moderate pain       SYNTHROID 112 MCG tablet Take 1 tablet (112 mcg) by mouth daily 90 tablet 3     topiramate (TOPAMAX) 25 MG tablet Take 1 tablet (25 mg) by mouth 2 times daily 180 tablet 1       Is patient on biologics or immunomodulators?     ALLERGIES:  Allergies   Allergen Reactions     Hydrocodone-Acetaminophen Hives, Itching and Nausea and Vomiting     Oxycodone Hives, Itching, Nausea and Vomiting and Rash       Office Visit on 10/13/2022   Component Date Value Ref Range Status     Sodium 10/13/2022 138  133 - 144 mmol/L Final     Potassium 10/13/2022 4.5  3.4 - 5.3 mmol/L Final     Chloride 10/13/2022 104  94 - 109 mmol/L Final     Carbon Dioxide (CO2) 10/13/2022 31  20 - 32 mmol/L Final     Anion Gap 10/13/2022 3  3 - 14 mmol/L Final     Urea Nitrogen 10/13/2022 23  7 - 30 mg/dL Final     Creatinine 10/13/2022 0.80  0.52 - 1.04 mg/dL Final     Calcium 10/13/2022 9.5  8.5 - 10.1 mg/dL Final     Glucose 10/13/2022 99  70 - 99 mg/dL Final     GFR Estimate 10/13/2022 85  >60 mL/min/1.73m2 Final    Effective December 21, 2021 eGFRcr in adults is calculated using the 2021 CKD-EPI creatinine equation which includes age and gender (Tony et al., NEJ, DOI: 10.1056/PISUrf9193313)     WBC Count 10/13/2022 5.5  4.0 - 11.0 10e3/uL Final     RBC Count 10/13/2022 5.23 (H)  3.80 - 5.20 10e6/uL Final     Hemoglobin 10/13/2022 14.8  11.7 - 15.7 g/dL Final     Hematocrit 10/13/2022 45.7  35.0 - 47.0 % Final     MCV 10/13/2022 87  78 - 100 fL Final     MCH 10/13/2022 28.3  26.5 - 33.0 pg Final     MCHC 10/13/2022 32.4  31.5 - 36.5 g/dL Final     RDW 10/13/2022 12.4  10.0 - 15.0 % Final     Platelet Count 10/13/2022 180  150 - 450  10e3/uL Final     % Neutrophils 10/13/2022 66  % Final     % Lymphocytes 10/13/2022 24  % Final     % Monocytes 10/13/2022 7  % Final     % Eosinophils 10/13/2022 3  % Final     % Basophils 10/13/2022 0  % Final     % Immature Granulocytes 10/13/2022 0  % Final     Absolute Neutrophils 10/13/2022 3.6  1.6 - 8.3 10e3/uL Final     Absolute Lymphocytes 10/13/2022 1.3  0.8 - 5.3 10e3/uL Final     Absolute Monocytes 10/13/2022 0.4  0.0 - 1.3 10e3/uL Final     Absolute Eosinophils 10/13/2022 0.1  0.0 - 0.7 10e3/uL Final     Absolute Basophils 10/13/2022 0.0  0.0 - 0.2 10e3/uL Final     Absolute Immature Granulocytes 10/13/2022 0.0  <=0.4 10e3/uL Final       Fib4 0.86 (CMP from 7/2022)      Anti-obesity medication ROS:    Cardiovascular  CAD:Yes  HTN:No    Gastrointestinal  GERD:historically but not now   Liver Dz:No  Computed FIB-4 Calculation unavailable. Necessary lab results were not found in the last year.    H/O Pancreatitis:no, but family hx of pancreatitc cancer in two family members    Psychiatric  Bipolar: No  Anxiety:No  Depression:No  History of alcohol/drug abuse: No  Hx of eating disorder:No    Endocrine  Personal or family hx of MTC or MEN2:no but had viral infarct of thyroid with tyroidectomy  Diabetes/prediabetes: No    Neurologic:  Chronic pain/opioids use: No      History of kidney stones: No  Kidney disease: No  Current birth control: No      PHYSICAL EXAM:  Objective    Physical Exam   GENERAL: Healthy, alert and no distress  EYES: Eyes grossly normal to inspection.  No discharge or erythema, or obvious scleral/conjunctival abnormalities.  RESP: No audible wheeze, cough, or visible cyanosis.  No visible retractions or increased work of breathing.    SKIN: Visible skin clear. No significant rash, abnormal pigmentation or lesions.  NEURO: Cranial nerves grossly intact.  Mentation and speech appropriate for age.  PSYCH: Mentation appears normal, affect normal/bright, judgement and insight intact, normal  speech and appearance well-groomed.       In summary, Thelma Uribe has Class III obesity with a body mass index of Body mass index is 40.96 kg/m . kg/m2 and the comorbidities stated above. She completed an informational seminar and is a possible candidate for the laparoscopic gastric sleeve.  She will have to complete the following pre-requisites:    Today in the office we discussed gastric sleeve surgery. Preoperative, perioperative, and postoperative processes, management, and follow up were addressed.  Risks and benefits were outlined including the risk of death, staple line leak (1-2%), PE, DVT, ulcer, worsening GERD, N/V, stricture, hernia, wound infection, weight regain, and vitamin deficiencies. I emphasized exercise and activity along with appropriate food choice as the main foundation for weight loss with surgery providing surgical reinforcement of this.  All questions were answered.  A goal sheet and support group handout were given to the patient.      If you have not already watched our online seminar please go to [email or web address]    Weight loss requirement: 10 prior to surgery. Will have final weight check 2-3 weeks prior to surgery at anesthesia or nurse pre-op teaching visit.  GW 235lb  -Need current weight confirmation at primary clinic or weight management clinic No    Bariatric labs ordered, call for a lab only appointment at any [facility] lab. To find a lab location near you, please call [phone]. Please let us know if orders need to be faxed to a non [facility] lab.    Schedule bariatric psych eval as soon as possible.  List of psychologists will be sent to you via [facility] or given to you in clinic.     Call Delta Lau at [phone] to discuss insurance coverage for bariatric surgery.  Please check with your insurance regarding bariatric surgery coverage also. Delta can also help you with scheduling psych eval if you are having difficulties.    The  following clearance letters are needed: Letters will be sent to you via [email or web address] or given to you in clinic  - Letter of support from primary care provider. Provider can submit through electronic medical record or fax to [phone]  - hematology, sleep medicine, psych, primary care  Smoking cessation and nicotine test needed: No    Birth control after surgery discussed. Patient instructed that 2 forms of birth control required after surgery and to avoid pregnancy for at least 18 months after surgery: N/A    NEXT VISITS: A  should reach out to you to schedule the following appointments.  If they do not reach you please call [phone] to schedule the following appointments:        Once the patient has completed the requirements in their task list and there are no further recommendations, the pt will be allowed to see the surgeon of her choice for consultation on the laparoscopic gastric sleeve surgery. Patient verbalizes understanding of the process to surgery and expectations for the postoperative period including the need for lifelong lifestyle changes, vitamin supplementation, and laboratory monitoring.    Sincerely,     Charis Fuller NP      75 minutes spent on the date of the encounter doing chart review, history and exam, documentation and further activities per the note

## 2023-02-08 NOTE — ASSESSMENT & PLAN NOTE
Adult onset obesity with numerous weight loss attempts over the years. Met with Dr. Graham in 2021 and tried qsymia with adverse effects. Has been taking topiramate with primary care and working on dietary and lifestyle changes since October 2022 and has lost 8lb (more on her own scale). She would like to consider bariatric surgery for more durable weight management given complicated lower extremity injuries which significantly impacts her tolerance to ambulate and exercise the way she would like. comorbidities include dyslipidemia, LETY, post thyroidectomy hypothyroidism, PCOS and infertility.     Personal history of hemorrhage after several past surgeries. Sister has VonWillibrand and father has clotting factor disorder. Seen by hematology after hemorrhage inpatient after ortho surgery but had never followed up once she was healthy. Would recommend hematology clearance prior to surgery.     LETY well controlled with CPAP. No recent follow up. Would recommend clearance/ review of CPAP compliance prior to surgery.     Reflux is non-existent. Was stressed induced years ago, EGD in 2012 is normal. EGD done for dysphagia which was secondary to thyroid enlargement.      We did discuss role of AOM and medication options. Discussed long term use of AOM for weight management. Family history of pancreatitic malignancy in mother and sister. Understandably hesitant about use of GLP1 for long term management. Discussed not absolute contraindication but understandable hesitation.       Plan:   -schedule psych eval  -schedule sleep consult  -schedule hematology consult  -labs today   -continue topiramate 25mg bid   -start working with dietitian   -letter of support from primary care   -see Dr. Leyva in 1 month   -follow up with me in 3 months

## 2023-02-08 NOTE — NURSING NOTE
"(   Chief Complaint   Patient presents with     Consult     New bariatric surgery consult    )    ( Weight: 238 lb 9.6 oz )  ( Height: 5' 4\" (pt reported) )  ( BMI (Calculated): 40.96 )  (   )  (   )  (   )  (   )  (   )  (   )    ( BP: 129/85 )  (   )  ( Temp: 98.4  F (36.9  C) )  ( Temp src: Oral )  ( Pulse: 79 )  ( Resp: 16 )  ( SpO2: 100 % )    (   Patient Active Problem List   Diagnosis     CARDIOVASCULAR SCREENING; LDL GOAL LESS THAN 160     Thyroid nodule     GERD (gastroesophageal reflux disease)     Family history of ischemic heart disease     Morbid obesity (H)     Hyperlipidemia LDL goal <130     Postsurgical hypothyroidism    )  (   Current Outpatient Medications   Medication Sig Dispense Refill     Multiple Vitamin (THERAPEUTIC MULTIVITAMIN PO)        naproxen sodium (ANAPROX) 220 MG tablet Take 220 mg by mouth 2 times daily as needed for moderate pain       SYNTHROID 112 MCG tablet Take 1 tablet (112 mcg) by mouth daily 90 tablet 3     topiramate (TOPAMAX) 25 MG tablet Take 1 tablet (25 mg) by mouth 2 times daily 180 tablet 1     benzonatate (TESSALON) 100 MG capsule Take 1 capsule (100 mg) by mouth 3 times daily as needed for cough 30 capsule 0    )  ( Diabetes Eval:    )    ( Pain Eval:  No Pain (0) )    ( Wound Eval:       )    (   History   Smoking Status     Never   Smokeless Tobacco     Never    )    ( Signed By:  Kathy Naranjo; February 8, 2023; 7:24 AM )    "

## 2023-02-09 ENCOUNTER — CARE COORDINATION (OUTPATIENT)
Dept: ENDOCRINOLOGY | Facility: CLINIC | Age: 58
End: 2023-02-09
Payer: COMMERCIAL

## 2023-02-09 ENCOUNTER — TELEPHONE (OUTPATIENT)
Dept: FAMILY MEDICINE | Facility: CLINIC | Age: 58
End: 2023-02-09
Payer: COMMERCIAL

## 2023-02-09 NOTE — PROGRESS NOTES
Bariatric Task List updated.  Bariatric information/clearance letters sent to patient via [employer].   Bariatric Task List    Fax:  Please fax all paperwork to: [phone] -     Status:  Is patient a candidate for bariatric surgery?:    -     Cleared to schedule surgeon consult?:    -     Status:    -     Surgeon: ana -     Tentative surgery month/year:   -        Insurance: Insurance:  [employer] -      Contact insurance to discuss coverage:   -       C[employer]: PCP Recommendation and Medical Clearance:  Needed -     HP Referral:    -      Advanced beneficiary notification (ABN) for Medicare patients for RD visits   and surgery:   -      Weight history:   -     Other:    -        Patient Info: Initial Weight:  245 - 10/2022 PCP visit   Date of Initial Weight/Height:  2/8/2023 -     Goal Weight (lbs):  238 -     Required Weight Loss:  10 -     Surgery Type:  sleeve gastrectomy -     Multidisciplinary Meeting:    -        Dietician Visits: Structured weight loss required by insurance?:    -     Dietician Visit 1:  Needed -     Dietician Visit 2:  Needed -     Dietician Visit 3:  Needed -     Dietician Visit 4:    -     Dietician Visit 5:    -     Dietician Visit 6:    -     Dietician Visit additional:  Needed - monthly until surgery   Clearance from dietician to see surgeon?:    -     Dietician Notes:    -        Psychological Evaluation: Psych eval:  Needed - Letter sent to pt RR   Therapist letter of support:    -     Psychiatrist letter of support:    -     Establish care with therapist:    -     Complete eating disorder evaluation:    -     Letter of clearance from therapist/eating disorder program:    -     Other:    -        Lab Work: Complete Blood Count:  Completed - completed 10/13/22 RR   Comprehensive Metabolic Panel:  Completed - completed 2/8/23 RR   Vitamin D:  Completed - completed 2/8/23 RR   PTH:  Completed - completed 2/8/23 RR   Hgb A1c:  Completed - completed 2/8/23 RR    Lipids: Completed -  "completed 2/8/23 RR    TSH ([facility], [facility], [place]): Completed - completed 2/8/23 RR     Ferritin:   -       Folate:   -       Testosterone, Total and Free:   -     Thiamine:   -     Vitamin A:   -     Vitamin B12:   -     Zinc:   -     C-peptide:   -     H. pylori:    -     MRSA (2 swabs, minimum 48 hours apart):   -     Nicotine Testing:    -     Recheck Vitamin D:   -     Other:    -        Consults/ Clearance Sleep Medicine:  Needed - letter sent to pt RR   Cardiac:    -     Pain:   -     Dental:    -     Endocrine:    -     Gastroenterology:    -     Vascular Medicine:    -     Hematology:  Needed - letter sent to pt RR   Medical Weight Management:   -     Physical Therapy/Exercise:    -     Nephrology:    -     Neurology:    -     Pulmonology:    -     Rheumatology:    -     Other:    -     Other:    -     Other:    -        Testing: UGI:    -     EGD:    -     Sleep Study:   -     Other:   -     Other:    -        PCP: Establish care with PCP:  Completed -     Follow up with PCP:    -     PCP letter of support:  Needed - letter sent to pt      Stopping Smoking/ Alcohol Use: Quit tobacco use (3 months smoke free)?:    -     Quit date:    -     Quit alcohol use:   -     Quit date:   -     Other:   -     Quit date:   -        Patient Education:  Information Session:    -     Given \"Making your decision\" handout?:    -     Given \"A Roadmap to you Weight Loss Surgery\" handout?:   -     Given \"Get Well Loop\" information?:   -     Given support group information?:    -     Attended support group?:    -     Support plan in place?:    -     Research consents signed?:    -     Avoid NSAIDS/ Alternate Plan for Pain:   -        Additional Surgery Requirements: Review Coag plan:    -     HgA1c <8:    -     Inpatient pain consult:    -     Final nicotine screen:    -     Dental work complete:    -     Birth control plan:    -     Gallstone prevention plan (Actigall for 6 months postop):   -     Other:   -     Other:   -      "   Final Tasks:  Before surgery online class:  Needed -     Before surgery online class website link:  [email or web address]   After surgery online class:  Needed -     After surgery online class website link:  [email or web address]   Nurse visit per clinic:  Needed -     History and Physical per clinic:   -     Final labs per clinic: Needed -     Chest xray per clinic:   -     Electrocardiogram (ECG) per clinic:   -     Other:   -        Notes:   -

## 2023-02-09 NOTE — TELEPHONE ENCOUNTER
Patient Returning Call    Reason for call:  Upcoming Appt 05/11/2023     Information relayed to patient:  yes    Patient has additional questions:  No    What are your questions/concerns:  Pt is confused because she is considering doing weight loss surgery and needs her CPAP checked out to make sure the settings are all good. And that she is actually using the machine. Scheduled pt for first available 05/11 with [facility]. PT thought that the referral from her dr was just to have the information downloaded off the machine. Please contact pt with clarification on what is needed.   PT was hoping to do surgery in May.    Could we send this information to you in [employer] or would you prefer to receive a phone call?:   Patient would prefer a phone call   Okay to leave a detailed message?: Yes at Cell number on file:    Telephone Information:   Mobile [phone]

## 2023-02-10 ENCOUNTER — OFFICE VISIT (OUTPATIENT)
Dept: ENDOCRINOLOGY | Facility: CLINIC | Age: 58
End: 2023-02-10
Payer: COMMERCIAL

## 2023-02-10 DIAGNOSIS — Z71.3 NUTRITIONAL COUNSELING: Primary | ICD-10-CM

## 2023-02-10 DIAGNOSIS — E66.813 CLASS 3 SEVERE OBESITY WITH SERIOUS COMORBIDITY AND BODY MASS INDEX (BMI) OF 40.0 TO 44.9 IN ADULT, UNSPECIFIED OBESITY TYPE (H): ICD-10-CM

## 2023-02-10 DIAGNOSIS — E66.01 CLASS 3 SEVERE OBESITY WITH SERIOUS COMORBIDITY AND BODY MASS INDEX (BMI) OF 40.0 TO 44.9 IN ADULT, UNSPECIFIED OBESITY TYPE (H): ICD-10-CM

## 2023-02-10 PROCEDURE — 99207 PR NO CHARGE LOS: CPT | Mod: VID

## 2023-02-10 PROCEDURE — 97804 MEDICAL NUTRITION GROUP: CPT | Mod: VID
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
## 2023-02-14 NOTE — PATIENT INSTRUCTIONS
GOALS:  Relating To Eating:  - Eat slowly (20-30 minutes per meal), chewing foods well (25 chews per bite/applesauce consistency)  - Focus on eating smaller portion sizes at meals and snacks    Relating to beverages:  - Reduce caffeine/carbonation/calorie containing beverages  - Separate fluids from meals by 30 minutes before, during, and after eating  - Drink 48-64 ounces of fluid per day. Small, frequent sips between meals.    Relating to activity:  Increase activity as able    Resources:  Protein Sources for Weight Loss  [email or web address]     Carbohydrates  [email or web address]     Mindful Eating  [email or web address]     Diet Guidelines after Weight Loss Surgery  [email or web address]     Seated Exercises for Arms and Legs (can be done before or after surgery)  [email or web address]    Post-op Diet Handouts:  Diet Guidelines after Weight-loss Surgery  [email or web address]     Your Stage 1 Diet: Clear Liquids  [email or web address]     Your Stage 2 Diet: Low-fat Full Liquids  [email or web address]     Your Stage 3 Diet: Pureed Foods  [email or web address]     Pureed Pleasures  [email or web address]    Your Stage 4 Diet: Soft Foods  [email or web address]    Your Stage 5 Diet: Regular Foods  [email or web address]    Supplements after Sleeve Gastrectomy, Gastric Bypass or Single Anastomosis Duodenal Switch  [email or web address]    Keeping Track of Fluids  [email or web address]    Follow up: Monthly until surgery     YURI Miramontes, RD, LD  Clinic #: [phone]

## 2023-02-15 ENCOUNTER — TELEPHONE (OUTPATIENT)
Dept: FAMILY MEDICINE | Facility: CLINIC | Age: 58
End: 2023-02-15
Payer: COMMERCIAL
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
## 2023-02-16 ENCOUNTER — TELEPHONE (OUTPATIENT)
Dept: ENDOCRINOLOGY | Facility: CLINIC | Age: 58
End: 2023-02-16
Payer: COMMERCIAL

## 2023-02-16 NOTE — TELEPHONE ENCOUNTER
MTM referral from: [facility] visit (referral by provider)    MTM referral outreach attempt #2 on February 16, 2023 at 11:09 AM      Outcome: Patient not reachable after several attempts, will route to MTM Pharmacist/Provider as an FYI.  MTM scheduling number is [phone].  Thank you for the referral.    Alexander Euceda, MTM coordinator    Pt is dsm

## 2023-02-17 NOTE — TELEPHONE ENCOUNTER
Spoke with patient. She will keep 5/11/23 appointment for now but will confirm with bariatric provider that this is within the acceptable time frame. Her surgery is not scheduled yet. She will call back if appointment needs to be changed.     Nanci WYATT RN  [facility]

## 2023-02-21 ENCOUNTER — TELEPHONE (OUTPATIENT)
Dept: ENDOCRINOLOGY | Facility: CLINIC | Age: 58
End: 2023-02-21

## 2023-02-21 ENCOUNTER — ALLIED HEALTH/NURSE VISIT (OUTPATIENT)
Dept: ENDOCRINOLOGY | Facility: CLINIC | Age: 58
End: 2023-02-21
Payer: COMMERCIAL

## 2023-02-21 DIAGNOSIS — E66.813 CLASS 3 SEVERE OBESITY WITH SERIOUS COMORBIDITY AND BODY MASS INDEX (BMI) OF 40.0 TO 44.9 IN ADULT, UNSPECIFIED OBESITY TYPE (H): Primary | ICD-10-CM

## 2023-02-21 DIAGNOSIS — E66.01 CLASS 3 SEVERE OBESITY WITH SERIOUS COMORBIDITY AND BODY MASS INDEX (BMI) OF 40.0 TO 44.9 IN ADULT, UNSPECIFIED OBESITY TYPE (H): Primary | ICD-10-CM

## 2023-02-21 PROCEDURE — 99207 PR NO CHARGE NURSE ONLY: CPT

## 2023-02-21 NOTE — TELEPHONE ENCOUNTER
PA Initiation    Medication: Wegovy PA  Insurance Company: [employer] ([facility]) - Phone [phone] Fax [phone]  Pharmacy Filling the Rx:    Filling Pharmacy Phone:    Filling Pharmacy Fax:    Start Date: 2/21/2023    Key: [id number]

## 2023-02-22 NOTE — PROGRESS NOTES
New Bariatric Patient Class    Thelma Uribe attended the New Consult WLS class today.     Patient's current comorbidities include:  Patient Active Problem List   Diagnosis     CARDIOVASCULAR SCREENING; LDL GOAL LESS THAN 160     Thyroid nodule     GERD (gastroesophageal reflux disease)     Family history of ischemic heart disease     Class 3 severe obesity with serious comorbidity and body mass index (BMI) of 40.0 to 44.9 in adult (H)     Hyperlipidemia LDL goal <130     Postsurgical hypothyroidism       Current Outpatient Medications   Medication Sig Dispense Refill     Multiple Vitamin (THERAPEUTIC MULTIVITAMIN PO)        naproxen sodium (ANAPROX) 220 MG tablet Take 220 mg by mouth 2 times daily as needed for moderate pain       Semaglutide-Weight Management (WEGOVY) 0.25 MG/0.5ML SOAJ Inject 0.25 mg Subcutaneous every 7 days 2 mL 0     Semaglutide-Weight Management (WEGOVY) 0.5 MG/0.5ML SOAJ Inject 0.5 mg Subcutaneous every 7 days 2 mL 1     SYNTHROID 112 MCG tablet Take 1 tablet (112 mcg) by mouth daily 90 tablet 3     topiramate (TOPAMAX) 25 MG tablet Take 1 tablet (25 mg) by mouth 2 times daily 180 tablet 1       The following items were reviewed and questions answered.  1. Goal weight  2. Labs  3. Psych clearance  4. Specialty Clearances  5. Task list  6. Preop diet and exercise changes  7. Postop diet requirements  8. Postop medication requirements  9. Postop exercise  10. Postop lifetime behavior and diet changes    Patient will continue to meet with PAMELA, Dietician and Surgeon as required preoperatively.    All questions answered.  Patient instructed to contact the office for any questions and to notify office of any updates/clearances completed.    Bariatric Task List    Fax:  Please fax all paperwork to: [phone] -     Status:  Is patient a candidate for bariatric surgery?:    -     Cleared to schedule surgeon consult?:    -     Status:    -     Surgeon: Autumn -     Tentative surgery  month/year:   -        Insurance: Insurance:  [employer] -      Contact insurance to discuss coverage:   -       [employer]: PCP Recommendation and Medical Clearance:  Needed -     HP Referral:    -      Advanced beneficiary notification (ABN) for Medicare patients for RD visits   and surgery:   -      Weight history:   -     Other:    -        Patient Info: Initial Weight:  245 - 10/2022 PCP visit   Date of Initial Weight/Height:  2/8/2023 -     Goal Weight (lbs):  238 -     Required Weight Loss:  10 -     Surgery Type:  sleeve gastrectomy -     Multidisciplinary Meeting:    -        Dietician Visits: Structured weight loss required by insurance?:    -     Dietician Visit 1:  Needed -     Dietician Visit 2:  Needed -     Dietician Visit 3:  Needed -     Dietician Visit 4:    -     Dietician Visit 5:    -     Dietician Visit 6:    -     Dietician Visit additional:  Needed - monthly until surgery   Clearance from dietician to see surgeon?:    -     Dietician Notes:    -        Psychological Evaluation: Psych eval:  Needed - Letter sent to pt RR   Therapist letter of support:    -     Psychiatrist letter of support:    -     Establish care with therapist:    -     Complete eating disorder evaluation:    -     Letter of clearance from therapist/eating disorder program:    -     Other:    -        Lab Work: Complete Blood Count:  Completed - completed 10/13/22 RR   Comprehensive Metabolic Panel:  Completed - completed 2/8/23 RR   Vitamin D:  Completed - completed 2/8/23 RR   PTH:  Completed - completed 2/8/23 RR   Hgb A1c:  Completed - completed 2/8/23 RR    Lipids: Completed - completed 2/8/23 RR    TSH (IMANI, S[facility], [place] MA): Completed - completed 2/8/23 RR     Ferritin:   -       Folate:   -       Testosterone, Total and Free:   -     Thiamine:   -     Vitamin A:   -     Vitamin B12:   -     Zinc:   -     C-peptide:   -     H. pylori:    -     MRSA (2 swabs, minimum 48 hours apart):   -     Nicotine Testing:    -    "  Recheck Vitamin D:   -     Other:    -        Consults/ Clearance Sleep Medicine:  Needed - letter sent to pt RR   Cardiac:    -     Pain:   -     Dental:    -     Endocrine:    -     Gastroenterology:    -     Vascular Medicine:    -     Hematology:  Needed - letter sent to pt RR   Medical Weight Management:   -     Physical Therapy/Exercise:    -     Nephrology:    -     Neurology:    -     Pulmonology:    -     Rheumatology:    -     Other:    -     Other:    -     Other:    -        Testing: UGI:    -     EGD:    -     Sleep Study:   -     Other:   -     Other:    -        PCP: Establish care with PCP:  Completed -     Follow up with PCP:    -     PCP letter of support:  Needed - letter sent to pt      Stopping Smoking/ Alcohol Use: Quit tobacco use (3 months smoke free)?:    -     Quit date:    -     Quit alcohol use:   -     Quit date:   -     Other:   -     Quit date:   -        Patient Education:  Information Session:    -     Given \"Making your decision\" handout?:    -     Given \"A Roadmap to you Weight Loss Surgery\" handout?:   -     Given \"Get Well Loop\" information?:   -     Given support group information?:    -     Attended support group?:    -     Support plan in place?:    -     Research consents signed?:    -     Avoid NSAIDS/ Alternate Plan for Pain:   -        Additional Surgery Requirements: Review Coag plan:    -     HgA1c <8:    -     Inpatient pain consult:    -     Final nicotine screen:    -     Dental work complete:    -     Birth control plan:    -     Gallstone prevention plan (Actigall for 6 months postop):   -     Other:   -     Other:   -        Final Tasks:  Before surgery online class:  Needed -     Before surgery online class website link:  [email or web address]   After surgery online class:  Needed -     After surgery online class website link:  [email or web address]   Nurse visit per clinic:  Needed -     History and Physical per clinic:   -   "   Final labs per clinic: Needed -     Chest xray per clinic:   -     Electrocardiogram (ECG) per clinic:   -     Other:   -        Notes:   -

## 2023-03-06 LAB — HBA1C MFR BLD: NORMAL %

## 2023-03-07 DIAGNOSIS — E66.813 CLASS 3 SEVERE OBESITY WITH SERIOUS COMORBIDITY AND BODY MASS INDEX (BMI) OF 40.0 TO 44.9 IN ADULT, UNSPECIFIED OBESITY TYPE (H): Primary | ICD-10-CM

## 2023-03-07 DIAGNOSIS — E66.01 CLASS 3 SEVERE OBESITY WITH SERIOUS COMORBIDITY AND BODY MASS INDEX (BMI) OF 40.0 TO 44.9 IN ADULT, UNSPECIFIED OBESITY TYPE (H): Primary | ICD-10-CM

## 2023-03-08 NOTE — PROGRESS NOTES
"Thelma Uribe is a 57 year old female who is being evaluated via a billable video visit.      The patient has been notified of following:     \"This video visit will be conducted via a call between you and your physician/provider. We have found that certain health care needs can be provided without the need for an in-person physical exam.  This service lets us provide the care you need with a video conversation.  If a prescription is necessary we can send it directly to your pharmacy.  If lab work is needed we can place an order for that and you can then stop by our lab to have the test done at a later time.    Video visits are billed at different rates depending on your insurance coverage.  Please reach out to your insurance provider with any questions.    If during the course of the call the physician/provider feels a video visit is not appropriate, you will not be charged for this service.\"    Patient has given verbal consent for Video visit? Yes  How would you like to obtain your AVS? MyChart  If you are dropped from the video visit, the video invite should be resent to: Text to cell phone: [phone]  Will anyone else be joining your video visit? No  {If patient encounters technical issues they should call [phone]      Video-Visit Details    Type of service:  Video Visit    Video Start Time: 2:30 PM  Video End Time: 2:52 PM    Originating Location (pt. Location): Home    Distant Location (provider location): Offsite (providers home)     Platform used for Video Visit: [employer] Teams    During this virtual visit the patient is located in [place], patient verifies this as the location during the entirety of this visit.     Return Bariatric Nutrition Consultation Note    Reason For Visit: Nutrition Assessment    Thelma Uribe is a 57 year old presenting today for return bariatric nutrition consult.   Pt is interested in laparoscopic sleeve gastrectomy.    This is pt's second required nutrition visits " "prior to surgery.      Patient referred by Dr. Graham on August 30, 2021 for MWM and by Charis Fuller NP Feb 2023 for WLS.     CO-MORBIDITIES OF OBESITY INCLUDE:       2/6/2023   I have the following health issues associated with obesity: High Cholesterol, Sleep Apnea, Polycystic Ovarian Syndrome, Infertility, Osteoarthritis (joint disease), Hypothyroidism   I have the following symptoms associated with obesity: -     PMH: hip pain, knee pain, fatigue    SUPPORT:  Support System Reviewed With Patient 2/6/2023   Who do you have in your support network that can be available to help you for the first 2 weeks after surgery? wife, sisters, friends, other family   Who can you count on for support throughout your weight loss surgery journey? wife, sisters, friends       ANTHROPOMETRICS:  Weight 8/30/21: 245 lbs with BMI 40.96    Estimated body mass index is 40.96 kg/m  as calculated from the following:    Height as of 2/8/23: 1.626 m (5' 4\").    Weight as of 2/8/23: 108.2 kg (238 lb 9.6 oz).     Current weight: 232 lbs per pt     Goal weight: 235 lbs       2/6/2023   I have tried the following methods to lose weight Watching portions or calories, Exercise, Weight Watchers, Pre packaged meals ex: Nutrisystem, Slimfast, Prescription Medications, Physician directed program       Weight Loss Questions Reviewed With Patient 2/6/2023   How long have you been overweight? Since late 20's to early 40's     Medications:  Wegovy     NUTRITION HISTORY:  Per RD note 8/30/21:     Worked with nutritionist in 2019 - recommended by . Pushed supplements and supplement testing. Did like nutrition part of it - did elimination diet to determine allergies.  Never got to add back in phase - had bad accident. Fell off a ladder - bed ridden 3 months.      Was doing 1/2 plate greens, protein  Got away from carbs, dairy, alcohol   Wasn't the best way to live - was really strict with but lost weight.   All or nothing person - off of " "eating plan currrently     Goals per pt: work on implementing an eating plan, discussion on dietary recommendations      Typical day  Breakfast: yogurt,  fruit overnight oats  Lunch: Salad, chicken, hard boiled eggs, veggies  Dinner: chicken, salads, potatoes, meats  Snacks: candy, chocolate malts     Not a fan of logging food. Discussed plate method and intuitive eating.     2/10/23:  Patient attended New S Nutrition Class    3/10/23: Pt states she has been doing well since the nutrition class. Samantha has been helping reduce appetite and cravings. Has continued to lose weight and has now met goal weight. Working through task list. Doing well with practicing pre-surgery diet guidelines.     Additional information:  FT Physician assistant      Lives with wife - Mayda  No children     \"serious accident dec 2019. Broke both legs, long recovery. I have permanent limits to mobility. I am concerned about my weight and how it will affect me long term. I need to get this weight off my joints.\"    Recall Diet Questions Reviewed With Patient 2/6/2023   Describe what you typically consume for breakfast (typical or most recent): don't eat, but would be yogurt, grains, fruit if did or eggs   Describe what you typically consume for lunch (typical or most recent): salad and a protein   Describe what you typically consume for supper (typical or most recent): 2 veggies, a protein, or pasta and protein, or veggies  sweet potatoes, protein   Describe what you typically consume as snacks (typical or most recent): hard boiled, protein bar, fruit, nuts, veggies   How many ounces of water, or other low calorie drinks, do you drink daily (8 oz=1 glass)? 64 oz or more   How many ounces of caffeine (coffee, tea, pop) do you drink daily (8 oz=1 glass)? 16 oz   How many ounces of carbonated (pop, beer, sparkling water) drinks do you drinky daily (8 oz=1 glass)? 0 oz   How many ounces of juice, pop, sweet tea, sports drinks, protein drinks, " other sweetened drinks, do you drink daily (8 oz=1 glass)? 8 oz   How many ounces of milk do you drink daily (8 oz=1 glass) 0 oz   How often do you drink alcohol? Monthly or less   If you do drink alcohol, how many drinks might you have in a day? (one drink = 5 oz. wine, 1 can/bottle of beer, 1 shot liquor) -       Eating Habits 2/6/2023   Do you have any dietary restrictions? No   Do you currently binge eat (eat a large amount of food in a short time)? No   Are you an emotional eater? Yes   Do you get up to eat after falling asleep? No   What foods do you crave? candy, sweet, Kyrgyz       Dining Out History Reviewed With Patient 2/6/2023   How often do you dine out? Rarely.   Where do you dine out? (select all that apply) take out   What types of food do you order when you dine out? Belarusian, Lao, Sushi       Physical Activity Reviewed With Patient 2/6/2023   How often do you exercise? 1 to 2 times per week   What is the duration of your exercise (in minutes)? 45 Minutes   What types of exercise do you do? swimming, home gym, weightlifting   What keeps you from being more active?  I am as active as I can possbily be, I have just had surgery on one or more of my joints       EXERCISE:       2/6/2023   How often do you exercise? 1 to 2 times per week   What is the duration of your exercise (in minutes)? 45 Minutes   What types of exercise do you do? swimming, home gym, weightlifting   What keeps you from being more active?  I am as active as I can possbily be, I have just had surgery on one or more of my joints     Progress on Previous Goals- improving, continues  Relating To Eating:  - Eat slowly (20-30 minutes per meal), chewing foods well (25 chews per bite/applesauce consistency)  - Focus on eating smaller portion sizes at meals and snacks    Relating to beverages:  - Reduce caffeine/carbonation/calorie containing beverages  - Separate fluids from meals by 30 minutes before, during, and after eating  - Drink 48-64  ounces of fluid per day. Small, frequent sips between meals.    Relating to activity:  - Increase activity as able      NUTRITION DIAGNOSIS:  Obesity r/t long history of positive energy balance aeb BMI >30 kg/m2.    INTERVENTION:  Intervention Provided/Education Provided on post-op diet guidelines, vitamins/minerals essential post-operatively, GI anatomy of bariatric surgeries, ways to help prepare for post-op diet guidelines pre-operatively, portion/calorie-control, mindful eating and sources of protein.  Patient demonstrates understanding. Provided pt with list of goals RD contact information.      Questions Reviewed With Patient 2/6/2023   How ready are you to make changes regarding your weight? Number 1 = Not ready at all to make changes up to 10 = very ready. 10   How confident are you that you can change? 1 = Not confident that you will be successful making changes up to 10 = very confident. 10       Expected Engagement: good    GOALS:  Relating To Eating:  - Eat slowly (20-30 minutes per meal), chewing foods well (25 chews per bite/applesauce consistency)  - Focus on eating smaller portion sizes at meals and snacks    Relating to beverages:  - Reduce caffeine/carbonation/calorie containing beverages  - Separate fluids from meals by 30 minutes before, during, and after eating  - Drink 48-64 ounces of fluid per day. Small, frequent sips between meals.    Relating to activity:  - Increase activity as able      Resources:  Protein Sources for Weight Loss  [email or web address]     Carbohydrates  [email or web address]     Mindful Eating  [email or web address]     Diet Guidelines after Weight Loss Surgery  [email or web address]     Seated Exercises for Arms and Legs (can be done before or after surgery)  [email or web address]    Post-op Diet Handouts:  Diet Guidelines after Weight-loss Surgery  [email or web address]     Your Stage 1 Diet: Clear  Liquids  [email or web address]     Your Stage 2 Diet: Low-fat Full Liquids  [email or web address]     Your Stage 3 Diet: Pureed Foods  [email or web address]     Pureed Pleasures  [email or web address]    Your Stage 4 Diet: Soft Foods  [email or web address]    Your Stage 5 Diet: Regular Foods  [email or web address]    Supplements after Sleeve Gastrectomy, Gastric Bypass or Single Anastomosis Duodenal Switch  [email or web address]    Keeping Track of Fluids  [email or web address]    Follow up: Monthly until surgery     Time spent with patient: 22 minutes  Chio Montez RD, LD

## 2023-03-08 NOTE — PROGRESS NOTES
[facility]  [address] [place] [id number]  Phone: [phone], Fax: [phone]    Outpatient Visit Note:    Patient: Thelma Uribe  MRN: [id number]  : 1965  LUIS: Mar 9, 2023    Reason for Consultation:  Thelma Uribe is a referred for evaluation of bleeding diathesis, family history of von Willebrand disease.    Assessment:  In summary, Luz Maria Uribe is a 57 year old female with past medical history significant for hypothyroidism and obesity who presents today in consultation for hematological presurgical clearance as she has a family history of von Willebrand disease and endorses personal history of easy bruising, bleeding tendency and history of trauma related and post surgical bleeding. Luz Maria repots longstanding history of epistaxis, menorrhagia, and easy bruising since adolescence. She notes postoperative bleeding complications following tonsillectomy with POD1 rebleed, abdominal hematoma formation after dermoid cyst removal, and development of compartment syndrome after tibial plateau fracture with acute blood loss requiring 3 U of PRBCs. Her von Willebrand panel was normal in setting of acute trauma during hospitalization for tibial plateau fracture. It has not been repeated. Her baseline PTT and INR are normal and her platelet function closure back in 2019 was normal as well. She has had no evidence of thrombocytopenia.  No offending supplements or medications were identified. She is currently undergoing evaluation for possible future gastric sleeve and was referred for further evaluation of bleeding diathesis prior to surgery.      Luz Maria's history is most concerning for primary hemostatic defect. We discussed repeat baseline coagulation labs and von Willebrand panel. Will await results. If vW panel negative then would pursue additional platelet function testing including platelet aggregometry and electron microscopy. This would need to be  "arranged with special coagulation lab.     Will arrange follow up pending labs.     Patient understands and agrees with the above plan and recommendation.      Sharonda Livingston, MPH, PA-C  [facility]    45 minutes spent on the date of the encounter doing chart review, review of outside records, review of test results, interpretation of tests, patient visit and documentation     ----------------------------------------------------------------------------------------------------------------------  History of Present Illness:  Thelma \"Luz Maria\" Jojo is a 57 year old female with past medical history significant for hypothyroidism and obesity who presents today in consultation for hematological presurgical clearance as she has a family history of von Willebrand disease. She also reports longstanding history of easy bruising and post surgical bleeding.    Luz Maria reports that her sister was diagnosed with type 1 von Willebrand disease following post partum hemorrhage. She has reported mild phenotype and has been treated with DDAVP. Her sister did not have significant history of easy bruising, menorrhagia, or surgical bleeding.  She has five other siblings without bleeding tendency. Luz Maria's father had a history of venous thromboembolism in setting of positive cardiolipin antibodies and unfortunately  after intracranial hemorrhage from aneurysm in setting of supra-therapuetic INR.     Luz Maria notes that she had recurrent epistaxis in childhood and some in adulthood. She reports history of easy bruising without significant trauma. She notes bruising of her abdomen and scarce bruising of arms and legs. When she cuts herself it takes longer to stop bleeding. She has history of menorrhagia since entering menarche in her teen years. She reports menses lasting 5 days with heavy flow requiring changing of super tampons and pads every 2 hours with overnight leaking and " awakenings to change products. She notes that it became less heavy over the years. She is no longer menstruating. She denies history of hematuria. She does have a history of iron deficiency dating back to 2012.     In 12/2019, she had a traumatic fall 8-10 feet from a ladder resulting in right tibial plateau fracture with compartment syndrome and navicular fracture of the left foot. She required fasciotomy and had wound vac placement however had significant bleeding from fasciotomy sites requiring 3 unit(s) PRBC transfusion. Hematology was consulted given family history of von Willebrand disease. Her levels were checked while inpatient several times are were well in the normal range. Her PTT and INR were normal as well. A  was checked and was also in the normal range. She was recommended to have repeat testing as an outpatient however this has not happened yet. She underwent external fixation and subsequent ORIF of tibial plateau fracture and reduction of left foot dislocation. She was placed on 6 weeks of prophylactic intensity Lovenox after surgery. She reports a single bout of melena while on Lovenox.  She reports being on bed rest for about 3-4 months after surgery. She denies personal history of venous thromboembolism.     Other surgical history includes T&A with postoperative bleeding on POD1, laminectomy (without bleeding complication), partial thyroidectomy, left oopherectomy and dermoid cyst removal x2 with reported hematoma formation after second surgery, appendectomy (without bleeding complication), and right wrist reconstruction (without bleeding complication).     Most recently she had a left foot fusion [facility] in 11/2022. The procedure was uncomplicated however she noted that she had incisional oozing that was found 5-6 days later when cast was removed.     She was briefly on estrogen patch from 2016 - 2018 however was not on any hormone therapy in 2019 at the time her vW levels were checked.      She was taking 2 tabs of Aleve per day for pain management however she has been off this for the last three months. She denies any other significant herbal supplement use. She is a non smoker. She does not drink alcohol.     She is currently undergoing evaluation for bariatric surgery and was recommended to see hematology for vWD evaluation prior to possible future sleeve gastrectomy. She is currently on Wegovy and has already had 6 lb weight loss in a little over a week.       Past Medical History:  Past Medical History:   Diagnosis Date     Dysthymic disorder     much improved, took celexa x 1mo in '06     Elevated blood pressure reading without diagnosis of hypertension     in '10, much improved with diet/exercise     Exercise induced bronchospasm     takes albuterol very rarely     Female infertility of other specified origin     stopped trying     GERD (gastroesophageal reflux disease)     prilosec for awhile, stable off for yrs (2/16)     History of blood transfusion 12/19    hospitalization, 3 units     Thyroid disease        Past Surgical History:  Past Surgical History:   Procedure Laterality Date     BACK SURGERY  2001    lumbar discectomy     COLONOSCOPY  2018     COLONOSCOPY WITH CO2 INSUFFLATION N/A 06/20/2016    Procedure: COLONOSCOPY WITH CO2 INSUFFLATION;  Surgeon: Miguelito Erazo MD;  Location: [facility] OR     GYN SURGERY       HC REMOVE TONSILS/ADENOIDS,12+ Y/O  Age 14     left navicular fracture   12/17/2019     ORIF right tibial plateau fracture Right 12/26/2019     ORTHOPEDIC SURGERY       percutaneous pinning of foot Left 12/26/2019     right knee external fixator Right 12/17/2019     right leg fasciotomy with wound vac placement  12/17/2019     SOFT TISSUE SURGERY  12/19    right leg fasciotomy     THYROIDECTOMY  05/08/2012    Procedure:THYROIDECTOMY; LEFT THYROID LOBECTOMY; Surgeon:STEPHANIA FERNÁNDEZ; Location: OR     UPPER GI ENDOSCOPY  02/2012    bx's okay, rec different PPI     ZZC  APPENDECTOMY  Age 11     [facility] NONSPECIFIC PROCEDURE  1988    Right oophorectomy and dermoid cyst removal     [facility] NONSPECIFIC PROCEDURE  1997    Partial Left oophorectomy and dermoid cyst removal     [facility] NONSPECIFIC PROCEDURE  2002    L 4-5 herniated disc w/ laminectomy     [facility] NONSPECIFIC PROCEDURE  04/2003    Midcarpal Instability-Right wrist reconstruction     [facility] NONSPECIFIC PROCEDURE  1999    Left breast biopsy-benign       Medications:  Current Outpatient Medications   Medication Sig Dispense Refill     Multiple Vitamin (THERAPEUTIC MULTIVITAMIN PO)        naproxen sodium (ANAPROX) 220 MG tablet Take 220 mg by mouth 2 times daily as needed for moderate pain       Semaglutide-Weight Management (WEGOVY) 0.25 MG/0.5ML SOAJ Inject 0.25 mg Subcutaneous every 7 days 2 mL 0     Semaglutide-Weight Management (WEGOVY) 0.5 MG/0.5ML SOAJ Inject 0.5 mg Subcutaneous every 7 days 2 mL 1     SYNTHROID 112 MCG tablet Take 1 tablet (112 mcg) by mouth daily 90 tablet 3     topiramate (TOPAMAX) 25 MG tablet Take 1 tablet (25 mg) by mouth 2 times daily 180 tablet 1        Allergies:  Allergies   Allergen Reactions     Hydrocodone-Acetaminophen Hives, Itching and Nausea and Vomiting     Oxycodone Hives, Itching, Nausea and Vomiting and Rash       ROS:  Remainder of a comprehensive 14 point ROS is negative unless noted above.    Social History:  Patient recently retired from [employer], [employer]  Denies any tobacco use. No significant alcohol use. Denies any illicit drug use.     Family History:  Sister with Type 1 VWD   Father with anticardiolipin +, DVT and aneurysmal bleed in setting of supratherapeutic INR    Objectives:  Vitals: /84 (BP Location: Right arm, Patient Position: Sitting, Cuff Size: Adult Large)   Pulse 83   Temp 98.6  F (37  C) (Oral)   Wt 106.6 kg (235 lb)   LMP 09/10/2012 (Exact Date)   SpO2 98%   BMI 40.34 kg/m     Exam:   Constitutional: Appears well, no distress  HEENT: Pupils equal and round. No scleral  icterus or hemorrhage.   Respiratory: no increased work of breathing.   Mus/Skele: no edema of lower extremities  Skin: no petechiae, no ecchymosis on exposed dermis.  Neuro: CN II-XII intact. Normal gait. AOx3      Labs:  CBC RESULTS:   Recent Labs   Lab Test 10/13/22  1054   WBC 5.5   RBC 5.23*   HGB 14.8   HCT 45.7   MCV 87   MCH 28.3   MCHC 32.4   RDW 12.4        Last Comprehensive Metabolic Panel:  Sodium   Date Value Ref Range Status   02/08/2023 140 136 - 145 mmol/L Final   04/13/2021 138 133 - 144 mmol/L Final     Potassium   Date Value Ref Range Status   02/08/2023 4.9 3.4 - 5.3 mmol/L Final   10/13/2022 4.5 3.4 - 5.3 mmol/L Final   04/13/2021 3.9 3.4 - 5.3 mmol/L Final     Chloride   Date Value Ref Range Status   02/08/2023 105 98 - 107 mmol/L Final   10/13/2022 104 94 - 109 mmol/L Final   04/13/2021 106 94 - 109 mmol/L Final     Carbon Dioxide   Date Value Ref Range Status   04/13/2021 31 20 - 32 mmol/L Final     Carbon Dioxide (CO2)   Date Value Ref Range Status   02/08/2023 26 22 - 29 mmol/L Final   10/13/2022 31 20 - 32 mmol/L Final     Anion Gap   Date Value Ref Range Status   02/08/2023 9 7 - 15 mmol/L Final   10/13/2022 3 3 - 14 mmol/L Final   04/13/2021 1 (L) 3 - 14 mmol/L Final     Glucose   Date Value Ref Range Status   02/08/2023 114 (H) 70 - 99 mg/dL Final   10/13/2022 99 70 - 99 mg/dL Final   04/13/2021 114 (H) 70 - 99 mg/dL Final     Comment:     Fasting specimen     Urea Nitrogen   Date Value Ref Range Status   02/08/2023 19.3 6.0 - 20.0 mg/dL Final   10/13/2022 23 7 - 30 mg/dL Final   04/13/2021 19 7 - 30 mg/dL Final     Creatinine   Date Value Ref Range Status   02/08/2023 0.79 0.51 - 0.95 mg/dL Final   04/13/2021 0.68 0.52 - 1.04 mg/dL Final     GFR Estimate   Date Value Ref Range Status   02/08/2023 86 >60 mL/min/1.73m2 Final     Comment:     eGFR calculated using 2021 CKD-EPI equation.   04/13/2021 >90 >60 mL/min/[1.73_m2] Final     Comment:     Non  GFR  Calc  Starting 12/18/2018, serum creatinine based estimated GFR (eGFR) will be   calculated using the Chronic Kidney Disease Epidemiology Collaboration   (CKD-EPI) equation.       Calcium   Date Value Ref Range Status   02/08/2023 9.9 8.6 - 10.0 mg/dL Final   04/13/2021 8.7 8.5 - 10.1 mg/dL Final     Liver Function Studies -   Recent Labs   Lab Test 02/08/23  0836   PROTTOTAL 8.0   ALBUMIN 4.6   BILITOTAL 0.3   ALKPHOS 97   AST 23   ALT 34     12/18/2019 12/20/2019 12/22/2019 12/2019: OSH LABs reviewed   INR 1.1   PTT 28   TT 13   PFA WNL         Imaging:  US 10/24/2022 of right lower extremities showed patent deep vein system with competency of right GSV and LSV.

## 2023-03-09 ENCOUNTER — OFFICE VISIT (OUTPATIENT)
Dept: HEMATOLOGY | Facility: CLINIC | Age: 58
End: 2023-03-09
Attending: PHYSICIAN ASSISTANT
Payer: COMMERCIAL

## 2023-03-09 ENCOUNTER — TELEPHONE (OUTPATIENT)
Dept: FAMILY MEDICINE | Facility: CLINIC | Age: 58
End: 2023-03-09
Payer: COMMERCIAL

## 2023-03-09 VITALS
TEMPERATURE: 98.6 F | SYSTOLIC BLOOD PRESSURE: 119 MMHG | WEIGHT: 235 LBS | BODY MASS INDEX: 40.34 KG/M2 | DIASTOLIC BLOOD PRESSURE: 84 MMHG | OXYGEN SATURATION: 98 % | HEART RATE: 83 BPM

## 2023-03-09 DIAGNOSIS — Z83.2 FAMILY HISTORY OF BLEEDING OR CLOTTING DISORDER: ICD-10-CM

## 2023-03-09 DIAGNOSIS — G47.33 OSA (OBSTRUCTIVE SLEEP APNEA): ICD-10-CM

## 2023-03-09 DIAGNOSIS — E66.01 CLASS 3 SEVERE OBESITY WITH SERIOUS COMORBIDITY AND BODY MASS INDEX (BMI) OF 40.0 TO 44.9 IN ADULT, UNSPECIFIED OBESITY TYPE (H): ICD-10-CM

## 2023-03-09 DIAGNOSIS — D69.9 HEMORRHAGIC DIATHESIS (H): Primary | ICD-10-CM

## 2023-03-09 DIAGNOSIS — E66.813 CLASS 3 SEVERE OBESITY WITH SERIOUS COMORBIDITY AND BODY MASS INDEX (BMI) OF 40.0 TO 44.9 IN ADULT, UNSPECIFIED OBESITY TYPE (H): ICD-10-CM

## 2023-03-09 LAB
APTT PPP: 29 SECONDS (ref 22–38)
INR PPP: 1.06 (ref 0.85–1.15)

## 2023-03-09 PROCEDURE — 85240 CLOT FACTOR VIII AHG 1 STAGE: CPT | Performed by: PHYSICIAN ASSISTANT

## 2023-03-09 PROCEDURE — 85245 CLOT FACTOR VIII VW RISTOCTN: CPT | Performed by: PHYSICIAN ASSISTANT

## 2023-03-09 PROCEDURE — G0463 HOSPITAL OUTPT CLINIC VISIT: HCPCS | Performed by: PHYSICIAN ASSISTANT

## 2023-03-09 PROCEDURE — 85246 CLOT FACTOR VIII VW ANTIGEN: CPT | Performed by: PHYSICIAN ASSISTANT

## 2023-03-09 PROCEDURE — 85390 FIBRINOLYSINS SCREEN I&R: CPT | Mod: 26 | Performed by: PATHOLOGY

## 2023-03-09 PROCEDURE — 99215 OFFICE O/P EST HI 40 MIN: CPT | Performed by: PHYSICIAN ASSISTANT

## 2023-03-09 PROCEDURE — 85610 PROTHROMBIN TIME: CPT | Performed by: PHYSICIAN ASSISTANT

## 2023-03-09 PROCEDURE — 85247 CLOT FACTOR VIII MULTIMETRIC: CPT | Performed by: PHYSICIAN ASSISTANT

## 2023-03-09 PROCEDURE — 36415 COLL VENOUS BLD VENIPUNCTURE: CPT | Performed by: PHYSICIAN ASSISTANT

## 2023-03-09 PROCEDURE — 85730 THROMBOPLASTIN TIME PARTIAL: CPT | Performed by: PHYSICIAN ASSISTANT

## 2023-03-09 NOTE — TELEPHONE ENCOUNTER
Forms/Letter Request    Type of form/letter: other    Have you been seen for this request: No    Do we have the form/letter: Yes:     When is form/letter needed by: Monday 2/13    How would you like the form/letter returned: Fax    Patient Notified form requests are processed in 3-5 business days:Yes    Could we send this information to you in [employer] or would you prefer to receive a phone call?:   Patient would prefer a phone call   Okay to leave a detailed message?: Yes at Cell number on file:    Telephone Information:   Mobile [phone]

## 2023-03-09 NOTE — PATIENT INSTRUCTIONS
[facility]  [facility]  [facility], [address], [place] [address]  Main: [phone], Fax: [phone]    Thelma,   It was a pleasure seeing you today.  Thank you for allowing us to be involved in your care.  Please let us know if there is anything else we can do for you, so that we can be sure you are leaving completely satisfied with your care experience. Below is the plan that we discussed.     I will send you a Smart Panel message with your results and an explanation of findings. If normal von Willebrand levels, we should proceed with additional platelet function testing to be arranged at a later date.   2. Avoid platelet altering medications:   Common Medications that Impair Platelet Function     Aspirin and aspirin containing products (i.e. Ecotrin, Bufferin, Anacin, Excedrin)     Most Nonsteroidal Anti-inflammatory Agents (i.e. Ibuprofen (Motrin, Advil), Naproxen sodium (Aleve, Naprosyn ), Ketoprofen (Orudis KT), Indomethacin (Indocin ),Ketorolac (Acular , Toradol )    **for pain management, Tylenol or Celebrex (an NSAID that does not alter platelet function) are preferred.     Selective serotonin reuptake inhibitors (i.e. Fluxetine (Prozac ), Sertaline (Zoloft ), Paroxetine (Paxil ).   **Antidepressants from another class (such as SNRIs) would be preferred. Talk with your provider about your options.     Various Vitamins/Herbal Medicines: Ginko (Ginkgo Biloba), Licorice root, Tumeric, high doses of Vitamin E,      The following medications DO NOT impair platelet function    Acetaminophen (Tylenol , Liquiprin , Genapap , Panadol , Tempra , etc.) may be used for headache, pain or fever control and will not increase bleeding.    Celebrex  is a NUNO-2 non-steroidal, used for pain and inflammation that does not affect platelet function. Salsalate is a non-steroidal used for pain and inflammation that does not affect platelet function.        Your  nurse clinician is Irene Brooks, MSN, RN, [phone].   If they are unavailable and you have immediate concerns, please call [phone] and ask for a nurse.     Sharonda Livingston, MPH, PA-C  [facility]

## 2023-03-10 ENCOUNTER — VIRTUAL VISIT (OUTPATIENT)
Dept: ENDOCRINOLOGY | Facility: CLINIC | Age: 58
End: 2023-03-10

## 2023-03-10 DIAGNOSIS — Z71.3 NUTRITIONAL COUNSELING: Primary | ICD-10-CM

## 2023-03-10 DIAGNOSIS — E66.9 OBESITY: ICD-10-CM

## 2023-03-10 PROCEDURE — 99207 PR NO CHARGE LOS: CPT | Mod: VID | Performed by: DIETITIAN, REGISTERED

## 2023-03-10 PROCEDURE — 97803 MED NUTRITION INDIV SUBSEQ: CPT | Mod: VID | Performed by: DIETITIAN, REGISTERED

## 2023-03-10 NOTE — LETTER
"3/10/2023       RE: Thelma Uribe  [address]  [facility] [address]     Dear Colleague,    Thank you for referring your patient, Thelma Uribe, to the [facility] [place] at [facility]. Please see a copy of my visit note below.    Thelma Uribe is a 57 year old female who is being evaluated via a billable video visit.      The patient has been notified of following:     \"This video visit will be conducted via a call between you and your physician/provider. We have found that certain health care needs can be provided without the need for an in-person physical exam.  This service lets us provide the care you need with a video conversation.  If a prescription is necessary we can send it directly to your pharmacy.  If lab work is needed we can place an order for that and you can then stop by our lab to have the test done at a later time.    Video visits are billed at different rates depending on your insurance coverage.  Please reach out to your insurance provider with any questions.    If during the course of the call the physician/provider feels a video visit is not appropriate, you will not be charged for this service.\"    Patient has given verbal consent for Video visit? Yes  How would you like to obtain your AVS? MyChart  If you are dropped from the video visit, the video invite should be resent to: Text to cell phone: [phone]  Will anyone else be joining your video visit? No  {If patient encounters technical issues they should call [phone]      Video-Visit Details    Type of service:  Video Visit    Video Start Time: 2:30 PM  Video End Time: 2:52 PM    Originating Location (pt. Location): Home    Distant Location (provider location): Offsite (providers home)     Platform used for Video Visit: Microsoft Teams    During this virtual visit the patient is located in [place], patient verifies this as the " "location during the entirety of this visit.     Return Bariatric Nutrition Consultation Note    Reason For Visit: Nutrition Assessment    Thelma Uribe is a 57 year old presenting today for return bariatric nutrition consult.   Pt is interested in laparoscopic sleeve gastrectomy.    This is pt's second required nutrition visits prior to surgery.      Patient referred by Dr. Graham on August 30, 2021 for MWM and by Charis Fuller NP Feb 2023 for WLS.     CO-MORBIDITIES OF OBESITY INCLUDE:       2/6/2023   I have the following health issues associated with obesity: High Cholesterol, Sleep Apnea, Polycystic Ovarian Syndrome, Infertility, Osteoarthritis (joint disease), Hypothyroidism   I have the following symptoms associated with obesity: -     PMH: hip pain, knee pain, fatigue    SUPPORT:  Support System Reviewed With Patient 2/6/2023   Who do you have in your support network that can be available to help you for the first 2 weeks after surgery? wife, sisters, friends, other family   Who can you count on for support throughout your weight loss surgery journey? wife, sisters, friends       ANTHROPOMETRICS:  Weight 8/30/21: 245 lbs with BMI 40.96    Estimated body mass index is 40.96 kg/m  as calculated from the following:    Height as of 2/8/23: 1.626 m (5' 4\").    Weight as of 2/8/23: 108.2 kg (238 lb 9.6 oz).     Current weight: 232 lbs per pt     Goal weight: 235 lbs       2/6/2023   I have tried the following methods to lose weight Watching portions or calories, Exercise, Weight Watchers, Pre packaged meals ex: Nutrisystem, Slimfast, Prescription Medications, Physician directed program       Weight Loss Questions Reviewed With Patient 2/6/2023   How long have you been overweight? Since late 20's to early 40's     Medications:  Wegovy     NUTRITION HISTORY:  Per RD note 8/30/21:     Worked with nutritionist in 2019 - recommended by . Pushed supplements and supplement testing. Did like " "nutrition part of it - did elimination diet to determine allergies.  Never got to add back in phase - had bad accident. Fell off a ladder - bed ridden 3 months.      Was doing 1/2 plate greens, protein  Got away from carbs, dairy, alcohol   Wasn't the best way to live - was really strict with but lost weight.   All or nothing person - off of eating plan currrently     Goals per pt: work on implementing an eating plan, discussion on dietary recommendations      Typical day  Breakfast: yogurt,  fruit overnight oats  Lunch: Salad, chicken, hard boiled eggs, veggies  Dinner: chicken, salads, potatoes, meats  Snacks: candy, chocolate malts     Not a fan of logging food. Discussed plate method and intuitive eating.     2/10/23:  Patient attended [facility] Nutrition Class    3/10/23: Pt states she has been doing well since the nutrition class. Samantha has been helping reduce appetite and cravings. Has continued to lose weight and has now met goal weight. Working through task list. Doing well with practicing pre-surgery diet guidelines.     Additional information:  FT Physician assistant      Lives with wife - Mayda  No children     \"serious accident dec 2019. Broke both legs, long recovery. I have permanent limits to mobility. I am concerned about my weight and how it will affect me long term. I need to get this weight off my joints.\"    Recall Diet Questions Reviewed With Patient 2/6/2023   Describe what you typically consume for breakfast (typical or most recent): don't eat, but would be yogurt, grains, fruit if did or eggs   Describe what you typically consume for lunch (typical or most recent): salad and a protein   Describe what you typically consume for supper (typical or most recent): 2 veggies, a protein, or pasta and protein, or veggies  sweet potatoes, protein   Describe what you typically consume as snacks (typical or most recent): hard boiled, protein bar, fruit, nuts, veggies   How many ounces of water, or other " low calorie drinks, do you drink daily (8 oz=1 glass)? 64 oz or more   How many ounces of caffeine (coffee, tea, pop) do you drink daily (8 oz=1 glass)? 16 oz   How many ounces of carbonated (pop, beer, sparkling water) drinks do you drinky daily (8 oz=1 glass)? 0 oz   How many ounces of juice, pop, sweet tea, sports drinks, protein drinks, other sweetened drinks, do you drink daily (8 oz=1 glass)? 8 oz   How many ounces of milk do you drink daily (8 oz=1 glass) 0 oz   How often do you drink alcohol? Monthly or less   If you do drink alcohol, how many drinks might you have in a day? (one drink = 5 oz. wine, 1 can/bottle of beer, 1 shot liquor) -       Eating Habits 2/6/2023   Do you have any dietary restrictions? No   Do you currently binge eat (eat a large amount of food in a short time)? No   Are you an emotional eater? Yes   Do you get up to eat after falling asleep? No   What foods do you crave? candy, sweet, Irish       Dining Out History Reviewed With Patient 2/6/2023   How often do you dine out? Rarely.   Where do you dine out? (select all that apply) take out   What types of food do you order when you dine out? [place]er, , Sushi       Physical Activity Reviewed With Patient 2/6/2023   How often do you exercise? 1 to 2 times per week   What is the duration of your exercise (in minutes)? 45 Minutes   What types of exercise do you do? swimming, home gym, weightlifting   What keeps you from being more active?  I am as active as I can possbily be, I have just had surgery on one or more of my joints       EXERCISE:       2/6/2023   How often do you exercise? 1 to 2 times per week   What is the duration of your exercise (in minutes)? 45 Minutes   What types of exercise do you do? swimming, home gym, weightlifting   What keeps you from being more active?  I am as active as I can possdeshawny be, I have just had surgery on one or more of my joints     Progress on Previous Goals- improving, continues  Relating To  Eating:  - Eat slowly (20-30 minutes per meal), chewing foods well (25 chews per bite/applesauce consistency)  - Focus on eating smaller portion sizes at meals and snacks    Relating to beverages:  - Reduce caffeine/carbonation/calorie containing beverages  - Separate fluids from meals by 30 minutes before, during, and after eating  - Drink 48-64 ounces of fluid per day. Small, frequent sips between meals.    Relating to activity:  - Increase activity as able      NUTRITION DIAGNOSIS:  Obesity r/t long history of positive energy balance aeb BMI >30 kg/m2.    INTERVENTION:  Intervention Provided/Education Provided on post-op diet guidelines, vitamins/minerals essential post-operatively, GI anatomy of bariatric surgeries, ways to help prepare for post-op diet guidelines pre-operatively, portion/calorie-control, mindful eating and sources of protein.  Patient demonstrates understanding. Provided pt with list of goals RD contact information.      Questions Reviewed With Patient 2/6/2023   How ready are you to make changes regarding your weight? Number 1 = Not ready at all to make changes up to 10 = very ready. 10   How confident are you that you can change? 1 = Not confident that you will be successful making changes up to 10 = very confident. 10       Expected Engagement: good    GOALS:  Relating To Eating:  - Eat slowly (20-30 minutes per meal), chewing foods well (25 chews per bite/applesauce consistency)  - Focus on eating smaller portion sizes at meals and snacks    Relating to beverages:  - Reduce caffeine/carbonation/calorie containing beverages  - Separate fluids from meals by 30 minutes before, during, and after eating  - Drink 48-64 ounces of fluid per day. Small, frequent sips between meals.    Relating to activity:  - Increase activity as able      Resources:  Protein Sources for Weight Loss  [email or web address]     Carbohydrates  [email or web address]     Mindful  Eating  [email or web address]     Diet Guidelines after Weight Loss Surgery  [email or web address]     Seated Exercises for Arms and Legs (can be done before or after surgery)  [email or web address]    Post-op Diet Handouts:  Diet Guidelines after Weight-loss Surgery  [email or web address]     Your Stage 1 Diet: Clear Liquids  [email or web address]     Your Stage 2 Diet: Low-fat Full Liquids  [email or web address]     Your Stage 3 Diet: Pureed Foods  [email or web address]     Pureed Pleasures  [email or web address]    Your Stage 4 Diet: Soft Foods  [email or web address]    Your Stage 5 Diet: Regular Foods  [email or web address]    Supplements after Sleeve Gastrectomy, Gastric Bypass or Single Anastomosis Duodenal Switch  [email or web address]    Keeping Track of Fluids  [email or web address]    Follow up: Monthly until surgery     Time spent with patient: 22 minutes  Chio Montez RD, LD

## 2023-03-13 LAB
FACT VIII ACT/NOR PPP: 130 % (ref 55–200)
VWF AG ACT/NOR PPP IA: 191 % (ref 50–200)
VWF MULTIMERS PPP IB: NORMAL
VWF:AC ACT/NOR PPP IA: 119 % (ref 50–180)

## 2023-03-13 NOTE — PATIENT INSTRUCTIONS
Hi Luz Maria!    Follow-up with RD in 1 month    Thank you,    Chio Montez, RD, LD  If you would like to schedule or reschedule an appointment with the RD, please call [phone]    Nutrition Goals  Relating To Eating:  - Eat slowly (20-30 minutes per meal), chewing foods well (25 chews per bite/applesauce consistency)  - Focus on eating smaller portion sizes at meals and snacks    Relating to beverages:  - Reduce caffeine/carbonation/calorie containing beverages  - Separate fluids from meals by 30 minutes before, during, and after eating  - Drink 48-64 ounces of fluid per day. Small, frequent sips between meals.    Relating to activity:  - Increase activity as able    Interested in working with a health ? Health coaches work with you to improve your overall health and wellbeing. They look at the whole person, and may involve discussion of different areas of life, including, but not limited to the four pillars of health (sleep, exercise, nutrition, and stress management). Discuss with your care team if you would like to start working a health .    Health Coaching-3 Pack:    $99 for three health coaching visits    Visits may be done in person or via phone    Coaching is a partnership between the  and the client; Coaches do not prescribe or diagnose    Coaching helps inspire the client to reach his/her personal goals    COMPREHENSIVE WEIGHT MANAGEMENT PROGRAM  VIRTUAL SUPPORT GROUPS    For Support Group Information:      We offer support groups for patients who are working on weight loss and considering, preparing for or have had weight loss surgery.   There is no cost for this opportunity.  You are invited to attend the?Virtual Support Groups?provided by any of the following locations:    [facility] via [place] Teams with Dinorah Palacios RN  2.   [facility] via [place] Teams with Miguelito Hammond, PhD, LP  3.   [facility] via [place] with Rebeka Birmingham RN  4.   [facility] via  "[place] Teams with Rebeka Lu [facility]    The following Support Group information can also be found on our website:  [email or web address]    [email or web address]    [facility] Weight Loss Surgery Support Group    [facility] Weight Loss Surgery Support Group  The support group is a patient-lead forum that meets monthly to share experiences, encouragement and education. It is open to those who have had weight loss surgery, are scheduled for surgery, or are considering surgery.   WHEN: This group meets on the 3rd Wednesday of each month from 5:00PM - 6:00PM virtually using Microsoft Teams.   FACILITATOR: Led by Dinorah Francisco, MARK, LD, RN, the program's Clinical Coordinator.   TO REGISTER: Please contact the clinic via [email or web address].in or call the nurse line directly at [phone] to inform our staff that you would like an invite sent to you and to let us know the email you would like the invite sent to. Prior to the meeting, a link with directions on how to join the meeting will be sent to you.    2023 Meetings (speakers to be determined)  January 18: \"Let's Talk\" a time for the group to share.  February 15: \"Let's Talk\" a time for the group to share.  March 15: \"Let's Talk\" a time for the group to share.  April 19: \"Let's Talk\" a time for the group to share.  May 17: \"Let's Talk\" a time for the group to share.  June 21: \"Let's Talk\" a time for the group to share.    [facility] Support Groups    Connections Bariatric Care Support Group?  This is open to all pre- and post- operative bariatric surgery patients as well as their support system.   WHEN: This group meets the 2nd Tuesday of each month from 6:30 PM - 8:00 PM virtually using Microsoft Teams.   FACILITATOR: Led by Miguelito Hammond, Ph.D who is a Licensed Psychologist with " "the [facility] Comprehensive Weight Management Program.   TO REGISTER: Please send an email to Miguelito Hammond, Ph.D., LP at?[email or web address]?if you would like an invitation to the group and to learn about using Microsoft Teams.    2023 Meetings  January 10: Demario Montoya, PharmD, Pharmacy Resident, \"Medications and Bariatric Surgery\"  February 14:  March 14:  April 11:  May 9:  June 11:    Connections Post-Operative Bariatric Surgery Support Group  This is a support group for [facility] bariatric patients (and those external to [facility]) who have had bariatric surgery and are at least 3 months post-surgery.  WHEN: This support group meets the 4th Wednesday of the month from 11:00 AM - 12:00 PM virtually using Microsoft Teams.   FACILITATOR: Led by Certified Bariatric Nurse, Rebeka Birmingham RN.   TO REGISTER: Please send an email to Rebeka at [email or web address] if you would like an invitation to the group and to learn about using Microsoft Teams.    2023 Meetings  January 25  February 22  March 22  April 26  May 24  Jessica 28      [facility] Healthy Lifestyle Virtual Support Group    Healthy Lifestyle Virtual Support Group?  This is 60 minutes of small group guided discussion, support and resources. All are welcome who want a healthy lifestyle.  WHEN: This group meets monthly on a Friday from 12:30 PM - 1:30 PM virtually using Microsoft Teams.   FACILITATOR: Led by National Board Certified Health and , Rebeka Lu [facility].   TO REGISTER: Please send an email to Rebeka [email or web address] to receive monthly invites to the group or if you have any questions about having a health .  Prior to the meeting, a link with directions on how to join the meeting will be sent to you.    2023 Meetings January 20: \"Let's Talk\" a time for the group to share  February 17: Guest Speaker, Risa Holguin RD, Registered Dietician, \"Tips to " "Maximize Your Metabolism\"  March 17: Let's Talk\" a time for the group to share  April 14: Guest Speaker, Chio Montez RD, Registered Dietician, \"Heart Health\"  May 19: \"Let's Talk\" a time for the group to share  June: To be announced.      "

## 2023-03-16 ENCOUNTER — TELEPHONE (OUTPATIENT)
Dept: FAMILY MEDICINE | Facility: CLINIC | Age: 58
End: 2023-03-16
Payer: COMMERCIAL

## 2023-03-16 NOTE — TELEPHONE ENCOUNTER
Forms/Letter Request    Type of form/letter: other    Have you been seen for this request: No    Do we have the form/letter: Yes:     When is form/letter needed by: ASAP    How would you like the form/letter returned: Fax    Patient Notified form requests are processed in 3-5 business days:Yes    Could we send this information to you in [employer] or would you prefer to receive a phone call?:   Patient would prefer a phone call   Okay to leave a detailed message?: Yes at Cell number on file:    Telephone Information:   Mobile [phone]

## 2023-03-16 NOTE — TELEPHONE ENCOUNTER
Pt asking if another provider would be able to complete form today ASAP. Will send to another provider pt has seen in the past to see if able to complete for pt.  Silvia Villalba CMA

## 2023-03-17 DIAGNOSIS — Z83.2 FAMILY HISTORY OF BLEEDING OR CLOTTING DISORDER: ICD-10-CM

## 2023-03-17 DIAGNOSIS — D69.9 HEMORRHAGIC DIATHESIS (H): Primary | ICD-10-CM

## 2023-03-17 LAB
VON WILLEBRAND EVAL PPP-IMP: NORMAL
VWF MULTIMERS PPP QL: NORMAL

## 2023-04-04 ENCOUNTER — TELEPHONE (OUTPATIENT)
Dept: HEMATOLOGY | Facility: CLINIC | Age: 58
End: 2023-04-04
Payer: COMMERCIAL

## 2023-04-04 NOTE — TELEPHONE ENCOUNTER
[id number]  Thelma Uribe  57 year old female  CBCD Diagnosis: Family history of Von Willebrand Disease  CBCD Provider: Sharonda Escobar PA-C     Incoming call from patient. She is scheduled to come to clinic tomorrow for platelet aggregation and platelet electron microscopy labs. These labs will be drawn by special coagulation laboratory team.     Luz Maria accidentally took an Aleve yesterday. RN informed Sharonda Escobar PA-C who recommended that patient reschedule special coag lab visit for a time when she has not taken any NSAIDs for 7 days. Patient agreed to reschedule and cancelled appointment. RN left a voicemail for special coag team to please reach out to patient to reschedule the lab draw. Patient will call clinic back if she does not hear back from special coag team in the next two days.    Priyanka Naranjo RN, BSN, PCCN  Nurse Clinician    [facility]  [address] [place] [address], [address], [place] [address]   Office, direct: [phone]  Main office number: [phone]  Pronouns: She, her, hers

## 2023-04-10 ENCOUNTER — VIRTUAL VISIT (OUTPATIENT)
Dept: PHARMACY | Facility: CLINIC | Age: 58
End: 2023-04-10
Attending: NURSE PRACTITIONER
Payer: COMMERCIAL

## 2023-04-10 DIAGNOSIS — E66.813 CLASS 3 SEVERE OBESITY DUE TO EXCESS CALORIES WITH BODY MASS INDEX (BMI) OF 40.0 TO 44.9 IN ADULT, UNSPECIFIED WHETHER SERIOUS COMORBIDITY PRESENT (H): Primary | ICD-10-CM

## 2023-04-10 DIAGNOSIS — E66.01 CLASS 3 SEVERE OBESITY DUE TO EXCESS CALORIES WITH BODY MASS INDEX (BMI) OF 40.0 TO 44.9 IN ADULT, UNSPECIFIED WHETHER SERIOUS COMORBIDITY PRESENT (H): Primary | ICD-10-CM

## 2023-04-10 PROCEDURE — 99207 PR NO CHARGE LOS: CPT | Performed by: PHARMACIST

## 2023-04-10 NOTE — PROGRESS NOTES
"Disease State Management Encounter:                          Thelma Uribe is a 57 year old female called for for an initial visit. She was referred to me from Charis Fuller CNP.      Reason for visit: Wegovy start follow up. Cost issues with Wegovy.     Obesity:   Wegovy 0.5 mg once weekly    Followed by Charis Fuller NP, seen 2/2023 for New Pre-Bariatric Surgery Consult . Patient is experiencing the follow side effects: None other than headache 1-2 days after injecting. She is noticing fullness both at and between meals. She is happy she has lost 10 lb since starting Wegovy thus far. Patient reports high cost with the Wegovy despite meeting her $3000 deductible at this point. Reports last month cost of Wegovy when deductible was already met was over $800. Patient reports she did have the Wegovy savings card and thought it was used. She thought the cost for Wegovy through insurance was $250 for 1 month or 3 months before copay card.   Diet/Nutrition: She finds that she is able to make more healthful decisions since her hunger isn't driving her food choices. She had been doing intermittent fasting prior to Wegovy, but now doing 3 meals per Wegovy. Patient reports largest/highest fiber meal at lunch and smaller portion at dinnertime. Drinking 80+ oz water daily.   Exercise/Activity: Patient reports 4 days per week goes for 2 hours (water aerobics classes back to back). She finds that this works well due to her lower extremity issues. Has been doing this for 6 weeks now.     Current weight today: 228 lb   Wt Readings from Last 4 Encounters:   03/09/23 235 lb (106.6 kg)   02/08/23 238 lb 9.6 oz (108.2 kg)   10/13/22 246 lb 8 oz (111.8 kg)   07/20/22 245 lb (111.1 kg)     Estimated body mass index is 40.34 kg/m  as calculated from the following:    Height as of 2/8/23: 5' 4\" (1.626 m).    Weight as of 3/9/23: 235 lb (106.6 kg).     Assessment/Plan:  Patient would benefit from increasing to Wegovy 1 mg weekly, sent in " the RX to pharmacy.   Called [facility] and both pharmacies (the pharmacy that filled the 0.25 mg and the 0.5 mg) ran the RX through the insurance then savings card. Pharmacy reports that from what the breakdown looked like that patient had not quite met deductible before filling this most recent RX. When discussing with [facility], the Wegovy 1 mg RX now ran under insurance and copay card is $25. Called patient to let her know that now her deductible is met and copay.     Follow-up: Charis Fuller CNP  In 1 month.     I spent 30 minutes with this patient today and calling pharmacies cumulatively. All changes were made via collaborative practice agreement with Charis Fuller CNP . A copy of the visit note was provided to the patient's provider(s).    A summary of these recommendations was declined by the patient.    Lauren Bloch, PharmD, [facility] Center[facility]   Medication Therapy Management Pharmacist   [facility] [place]       Medication Therapy Recommendations  Class 3 severe obesity due to excess calories with body mass index (BMI) of 40.0 to 44.9 in adult, unspecified whether serious comorbidity present (H)    Current Medication: Phentermine-Topiramate 7.5-46 MG CP24 (Discontinued)   Rationale: Undesirable effect - Adverse medication event - Safety   Recommendation: Change Medication - Wegovy 0.25 MG/0.5ML Soaj   Status: Accepted per Provider          Current Medication: Semaglutide-Weight Management (WEGOVY) 0.5 MG/0.5ML SOAJ (Discontinued)   Rationale: Dose too low - Dosage too low - Effectiveness   Recommendation: Increase Dose - Wegovy 1 MG/0.5ML Soaj   Status: Accepted per CPA

## 2023-04-10 NOTE — PROGRESS NOTES
SUBJECTIVE:                                                   Thelma Uribe is a 57 year old female who presents to clinic today for the following health issue(s):  Patient presents with:  Consult: Discuss reassessing and restarting HRT. It's been over 10 years since she was on anything. She is having trouble sleeping, low libido, vaginal dryness and night sweats.      HPI: Patient is wanting to discuss menopausal symptoms and HRT.  She had both her ovaries out 10-12 years ago, for dermoid cysts.  STates has a very small portion of left ovary, but did go into menopause at the time.  Did the estrogen patch, progesterone at time, did help symptoms, but gained weight on the estrogen patch and stopped.   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   3 years ago she fell off a ladder and broke both her legs.  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  She states she is the best at this point.  She does water aerobics 3-4 days a week.    has changed her diet , other lifestyle things to help improve her health.  She is being worked up for a bleeding disorder.  There is a history of  Clotting and bleeding disorders in family, although her tests so far all negative.  She is meeting with that physician next week for all her final results.  She states sleeps well till about 3 am than wide awake.  She has tried melantonin, other OC things and do not help.      Patient's last menstrual period was 09/10/2012 (exact date)..    reports that she has never smoked. She has never used smokeless tobacco.  STD testing offered?  Declined  Health maintenance updated:  reviewed    Today's PHQ-2 Score:       1/30/2023    10:23 AM   PHQ-2 ( 1999 [employer])   Q1: Little interest or pleasure in doing things 0   Q2: Feeling down, depressed or hopeless 0   PHQ-2 Score 0   Q1: Little  interest or pleasure in doing things Not at all   Q2: Feeling down, depressed or hopeless Not at all   PHQ-2 Score 0     Today's PHQ-9 Score:       9/25/2012     2:45 PM   PHQ-9 SCORE   PHQ-9 Total Score 1     Today's KORIN-7 Score:        View : No data to display.                Problem list and histories reviewed & adjusted, as indicated.  Additional history: as documented.    Patient Active Problem List   Diagnosis     CARDIOVASCULAR SCREENING; LDL GOAL LESS THAN 160     Thyroid nodule     GERD (gastroesophageal reflux disease)     Family history of ischemic heart disease     Class 3 severe obesity with serious comorbidity and body mass index (BMI) of 40.0 to 44.9 in adult (H)     Hyperlipidemia LDL goal <130     Postsurgical hypothyroidism     Past Surgical History:   Procedure Laterality Date     BACK SURGERY  2001    lumbar discectomy     COLONOSCOPY  2018     COLONOSCOPY WITH CO2 INSUFFLATION N/A 06/20/2016    Procedure: COLONOSCOPY WITH CO2 INSUFFLATION;  Surgeon: Miguelito Erazo MD;  Location:  OR     GYN SURGERY       HC REMOVE TONSILS/ADENOIDS,12+ Y/O  Age 14     left navicular fracture   12/17/2019     ORIF right tibial plateau fracture Right 12/26/2019     ORTHOPEDIC SURGERY       percutaneous pinning of foot Left 12/26/2019     right knee external fixator Right 12/17/2019     right leg fasciotomy with wound vac placement  12/17/2019     SOFT TISSUE SURGERY  12/19    right leg fasciotomy     THYROIDECTOMY  05/08/2012    Procedure:THYROIDECTOMY; LEFT THYROID LOBECTOMY; Surgeon:STEPHANIA FERNÁNDEZ; Location: OR     UPPER GI ENDOSCOPY  02/2012    bx's okay, rec different PPI     ZZC APPENDECTOMY  Age 11     Z NONSPECIFIC PROCEDURE  1988    Right oophorectomy and dermoid cyst removal     Z NONSPECIFIC PROCEDURE  1997    Partial Left oophorectomy and dermoid cyst removal     [facility] NONSPECIFIC PROCEDURE  2002    L 4-5 herniated disc w/ laminectomy     Z NONSPECIFIC PROCEDURE  04/2003    Midcarpal  Instability-Right wrist reconstruction     [facility] NONSPECIFIC PROCEDURE      Left breast biopsy-benign      Social History     Tobacco Use     Smoking status: Never     Smokeless tobacco: Never   Vaping Use     Vaping status: Never Used     Passive vaping exposure: Yes   Substance Use Topics     Alcohol use: Yes     Comment: 1-3/week      Problem (# of Occurrences) Relation (Name,Age of Onset)    Arthritis (1) Mother (Mari)    Blood Disease (2) Father (Asif Martin.): Anti-cardiolipin syndrome, passed away from aneursym , Sister (Seema): Vonwillenbrand Syndrome    Cancer (2) Mother (Mari, 89): Pancreatic, Sister (56):  of pancreatic cancer w/in 3 mo of dx    Diabetes (2) Maternal Grandmother (Isabela): Type 2, Paternal Grandfather (Don)    Hypertension (2) Mother (Mari), Father (Asif Sr.)    Lipids (2) Mother (Mari), Father (Asif Sr.)    Osteoporosis (1) Mother (Mari): on meds, did have metarsal fx from slip    Thyroid Disease (4) Mother (Mari): Hypothyroid, Father (Asif Sr.): Hypothyroid, Sister (Seema), Sister (Dalia)    Prostate Cancer (1) Father (Asif Martin.)    C.A.D. (1) Father (Asif Martin., 65): Quadruple bypass- age 65    Other Cancer (4) Mother (aMri): pancreatic, Brother: kidney cancer, Brother (Asif Loya): kidney, Sister (Dalia): pancreatic    Hyperlipidemia (1) Father (Asif Martin.)    Coronary Artery Disease (4) Mother (Mari): cardiac stents, Father (Asif Sr.): quad bypass, Brother (54): Stent at 54 yrs, Brother (Asif Loya): quad bypass    Kidney Cancer (1) Brother (49): smoker       Negative family history of: Cerebrovascular Disease, Breast Cancer, Colon Cancer, Depression, Anxiety Disorder, Mental Illness, Substance Abuse, Anesthesia Reaction, Asthma, Genetic Disorder, Obesity, Unknown/Adopted            Current Outpatient Medications   Medication Sig     Multiple Vitamin (THERAPEUTIC MULTIVITAMIN PO)      naproxen sodium (ANAPROX) 220 MG tablet Take 220 mg by mouth 2  "times daily as needed for moderate pain     Semaglutide-Weight Management (WEGOVY) 1 MG/0.5ML pen Inject 1 mg Subcutaneous once a week     SYNTHROID 112 MCG tablet Take 1 tablet (112 mcg) by mouth daily     topiramate (TOPAMAX) 25 MG tablet TAKE 1 TABLET BY MOUTH TWICE A DAY     No current facility-administered medications for this visit.     Allergies   Allergen Reactions     Hydrocodone-Acetaminophen Hives, Itching and Nausea and Vomiting     Oxycodone Hives, Itching, Nausea and Vomiting and Rash       ROS:  12 point review of systems negative other than symptoms noted below or in the HPI.  Genitourinary: Vaginal Dryness  No urinary frequency or dysuria, bladder or kidney problems      OBJECTIVE:     /84   Ht 1.626 m (5' 4\")   Wt 104.8 kg (231 lb)   LMP 09/10/2012 (Exact Date)   BMI 39.65 kg/m    Body mass index is 39.65 kg/m .    Exam:  Constitutional:  Appearance: Well nourished, well developed alert, in no acute distress  Neurologic:  Mental Status:  Oriented X3.  Normal strength and tone, sensory exam grossly normal, mentation intact and speech normal.    Psychiatric:  Mentation appears normal and affect normal/bright.     In-Clinic Test Results:  Results for orders placed or performed in visit on 04/12/23 (from the past 24 hour(s))   Testosterone Free and Total    Narrative    The following orders were created for panel order Testosterone Free and Total.  Procedure                               Abnormality         Status                     ---------                               -----------         ------                     Sex Hormone Binding Glob...[[id number]]                      In process                 Testosterone Free and Total[[id number]]                      In process                   Please view results for these tests on the individual orders.       ASSESSMENT/PLAN:                                                        ICD-10-CM    1. Symptomatic menopausal or female climacteric " states  N95.1 Progesterone     Testosterone Free and Total     Estradiol     Progesterone     Testosterone Free and Total     Estradiol            Discussed HRT methods, risks and benefits, but if bleeding disorder is found may not be option.  Patient understands, really wanting to know where her hormone levels are.  Will notify patient with lab results when available.  Return prn.    KARI Kowalski CNP [facility] [place]

## 2023-04-12 ENCOUNTER — OFFICE VISIT (OUTPATIENT)
Dept: OBGYN | Facility: CLINIC | Age: 58
End: 2023-04-12
Payer: COMMERCIAL

## 2023-04-12 VITALS
HEIGHT: 64 IN | WEIGHT: 231 LBS | DIASTOLIC BLOOD PRESSURE: 84 MMHG | SYSTOLIC BLOOD PRESSURE: 124 MMHG | BODY MASS INDEX: 39.44 KG/M2

## 2023-04-12 DIAGNOSIS — N95.1 SYMPTOMATIC MENOPAUSAL OR FEMALE CLIMACTERIC STATES: Primary | ICD-10-CM

## 2023-04-12 LAB
ESTRADIOL SERPL-MCNC: 24 PG/ML
PROGEST SERPL-MCNC: 0.3 NG/ML
SHBG SERPL-SCNC: 60 NMOL/L (ref 30–135)

## 2023-04-12 PROCEDURE — 84144 ASSAY OF PROGESTERONE: CPT | Performed by: NURSE PRACTITIONER

## 2023-04-12 PROCEDURE — 82670 ASSAY OF TOTAL ESTRADIOL: CPT | Performed by: NURSE PRACTITIONER

## 2023-04-12 PROCEDURE — 84270 ASSAY OF SEX HORMONE GLOBUL: CPT | Performed by: NURSE PRACTITIONER

## 2023-04-12 PROCEDURE — 99203 OFFICE O/P NEW LOW 30 MIN: CPT | Performed by: NURSE PRACTITIONER

## 2023-04-12 PROCEDURE — 36415 COLL VENOUS BLD VENIPUNCTURE: CPT | Performed by: NURSE PRACTITIONER

## 2023-04-12 PROCEDURE — 84403 ASSAY OF TOTAL TESTOSTERONE: CPT | Performed by: NURSE PRACTITIONER

## 2023-04-12 NOTE — RESULT ENCOUNTER NOTE
Thelma      Your estrogen and progesterone results are okay for menopause.  If further questions please give me a call.    Alondra Goode RNC

## 2023-04-13 ENCOUNTER — MEDICAL CORRESPONDENCE (OUTPATIENT)
Dept: HEALTH INFORMATION MANAGEMENT | Facility: CLINIC | Age: 58
End: 2023-04-13

## 2023-04-13 ENCOUNTER — ALLIED HEALTH/NURSE VISIT (OUTPATIENT)
Dept: HEMATOLOGY | Facility: CLINIC | Age: 58
End: 2023-04-13
Payer: COMMERCIAL

## 2023-04-13 DIAGNOSIS — D69.9 HEMORRHAGIC DIATHESIS (H): ICD-10-CM

## 2023-04-13 DIAGNOSIS — Z83.2 FAMILY HISTORY OF BLEEDING OR CLOTTING DISORDER: ICD-10-CM

## 2023-04-13 LAB
Lab: NORMAL
PA AA BLD-ACNC: 75 %
PA ADP BLD-ACNC: 1.94 NM
PA ADP BLD-ACNC: 77 %
PA COLL PRP QL: 86 %
PA EPINEPH PRP QL: 71 %
PA RIST PRP QL: 6 %
PA RIST PRP QL: 79 %
PA RIST PRP QL: 85 %
PA RIST PRP QL: NORMAL
PA THROMBIN PRP: 2.12 NM
PERFORMING LABORATORY: NORMAL
SPECIMEN STATUS: NORMAL
TEST NAME: NORMAL
TESTOST FREE SERPL-MCNC: 0.24 NG/DL
TESTOST SERPL-MCNC: 20 NG/DL (ref 8–60)

## 2023-04-13 PROCEDURE — 85390 FIBRINOLYSINS SCREEN I&R: CPT

## 2023-04-13 PROCEDURE — 999N000103 HC STATISTIC NO CHARGE FACILITY FEE

## 2023-04-13 PROCEDURE — 85576 BLOOD PLATELET AGGREGATION: CPT | Mod: 26 | Performed by: PATHOLOGY

## 2023-04-13 PROCEDURE — 88348 ELECTRON MICROSCOPY DX: CPT

## 2023-04-13 PROCEDURE — 84999 UNLISTED CHEMISTRY PROCEDURE: CPT

## 2023-04-13 PROCEDURE — 36415 COLL VENOUS BLD VENIPUNCTURE: CPT

## 2023-04-13 PROCEDURE — 85576 BLOOD PLATELET AGGREGATION: CPT | Performed by: PATHOLOGY

## 2023-04-13 NOTE — RESULT ENCOUNTER NOTE
Thelma      Your  testosterone levels are normal.  If further questions please give me a call.    Alondra Goode RNC

## 2023-04-19 LAB — MAYO MISC RESULT: NORMAL

## 2023-04-20 ASSESSMENT — SLEEP AND FATIGUE QUESTIONNAIRES
HOW LIKELY ARE YOU TO NOD OFF OR FALL ASLEEP WHILE SITTING AND READING: SLIGHT CHANCE OF DOZING
HOW LIKELY ARE YOU TO NOD OFF OR FALL ASLEEP WHILE SITTING AND TALKING TO SOMEONE: WOULD NEVER DOZE
HOW LIKELY ARE YOU TO NOD OFF OR FALL ASLEEP WHILE SITTING INACTIVE IN A PUBLIC PLACE: WOULD NEVER DOZE
HOW LIKELY ARE YOU TO NOD OFF OR FALL ASLEEP IN A CAR, WHILE STOPPED FOR A FEW MINUTES IN TRAFFIC: WOULD NEVER DOZE
HOW LIKELY ARE YOU TO NOD OFF OR FALL ASLEEP WHEN YOU ARE A PASSENGER IN A CAR FOR AN HOUR WITHOUT A BREAK: WOULD NEVER DOZE
HOW LIKELY ARE YOU TO NOD OFF OR FALL ASLEEP WHILE SITTING QUIETLY AFTER LUNCH WITHOUT ALCOHOL: WOULD NEVER DOZE
HOW LIKELY ARE YOU TO NOD OFF OR FALL ASLEEP WHILE LYING DOWN TO REST IN THE AFTERNOON WHEN CIRCUMSTANCES PERMIT: SLIGHT CHANCE OF DOZING
HOW LIKELY ARE YOU TO NOD OFF OR FALL ASLEEP WHILE WATCHING TV: SLIGHT CHANCE OF DOZING

## 2023-04-27 ENCOUNTER — TRANSFERRED RECORDS (OUTPATIENT)
Dept: HEALTH INFORMATION MANAGEMENT | Facility: CLINIC | Age: 58
End: 2023-04-27

## 2023-04-27 ENCOUNTER — OFFICE VISIT (OUTPATIENT)
Dept: SLEEP MEDICINE | Facility: CLINIC | Age: 58
End: 2023-04-27
Attending: NURSE PRACTITIONER
Payer: COMMERCIAL

## 2023-04-27 VITALS
OXYGEN SATURATION: 98 % | BODY MASS INDEX: 39.76 KG/M2 | RESPIRATION RATE: 16 BRPM | WEIGHT: 232.9 LBS | HEIGHT: 64 IN | SYSTOLIC BLOOD PRESSURE: 142 MMHG | HEART RATE: 70 BPM | DIASTOLIC BLOOD PRESSURE: 97 MMHG

## 2023-04-27 DIAGNOSIS — G47.00 INSOMNIA, UNSPECIFIED TYPE: ICD-10-CM

## 2023-04-27 DIAGNOSIS — E66.01 CLASS 3 SEVERE OBESITY WITH SERIOUS COMORBIDITY AND BODY MASS INDEX (BMI) OF 40.0 TO 44.9 IN ADULT, UNSPECIFIED OBESITY TYPE (H): ICD-10-CM

## 2023-04-27 DIAGNOSIS — E66.813 CLASS 3 SEVERE OBESITY WITH SERIOUS COMORBIDITY AND BODY MASS INDEX (BMI) OF 40.0 TO 44.9 IN ADULT, UNSPECIFIED OBESITY TYPE (H): ICD-10-CM

## 2023-04-27 DIAGNOSIS — G47.33 OSA (OBSTRUCTIVE SLEEP APNEA): Primary | ICD-10-CM

## 2023-04-27 PROCEDURE — 99203 OFFICE O/P NEW LOW 30 MIN: CPT | Performed by: NURSE PRACTITIONER

## 2023-04-27 NOTE — PATIENT INSTRUCTIONS
"  Your blood pressure was checked while you were in clinic today.  Please read the guidelines below about what these numbers mean and what you should do about them.  Your systolic blood pressure is the top number.  This is the pressure when the heart is pumping.  Your diastolic blood pressure is the bottom number.  This is the pressure in between beats.  If your systolic blood pressure is less than 120 and your diastolic blood pressure is less than 80, then your blood pressure is normal. There is nothing more that you need to do about it  If your systolic blood pressure is 120-139 or your diastolic blood pressure is 80-89, your blood pressure may be higher than it should be.  You should have your blood pressure re-checked within a year by a primary care provider.  If your systolic blood pressure is 140 or greater or your diastolic blood pressure is 90 or greater, you may have high blood pressure.  High blood pressure is treatable, but if left untreated over time it can put you at risk for heart attack, stroke, or kidney failure.  You should have your blood pressure re-checked by a primary care provider within the next four weeks.    MY INFORMATION ON SLEEP APNEA-  Thelma Uribe    DOCTOR : KARI Perez CNP  SLEEP CENTER :      MY CONTACT NUMBER:   [facility]  [phone]  [facility]   [phone]  [facility]   [phone]      [facility]  [phone]  [facility]   [phone]    [facility] [place] [address], [address]  [place] [address]  Phone: [phone]    Hours:  Mon - Fri: 8:00 a.m. - 4:30 p.m.  Sat: Closed  Sun: Closed      Key Points:  1. What is Obstructive Sleep Apnea (LETY)? LETY is the most common type of sleep apnea. Apnea literally means, \"without breath.\" It is characterized by repetitive pauses in breathing, despite " continued effort to breathe, and is usually associated with a reduction in blood oxygen saturation. Apneas can last 10 to over 60 seconds. It is caused by narrowing or collapse of the upper airway as muscles relax during sleep.   2. What are the consequences of LETY? Symptoms include: daytime sleepiness- possibly increasing the risk of falling asleep while driving, unrefreshing/restless sleep, snoring, insomnia, waking frequently to urinate, waking with heartburn or reflux, reduced concentration and memory, and morning headaches. Other health consequences may include development of high blood pressure. Untreated LETY also can contribute to heart disease, stroke and diabetes.   3. What are the treatment options? In most situations, sleep apnea is a lifelong disease that must be managed with daily therapy. Continuous Positive Airway (CPAP) is the most reliable treatment. A mouthguard to hold your jaw forward is usually the next most reliable option. Other options include postioning devices (to keep you off your back), nasal valves, tongue retaining device, weight loss, surgery. There is more detail about these options toward the end of this document.  4. What are the most important things to remember about using CPAP?     WHERE CAN I FIND MORE INFORMATION?    American Academy of Sleep Medicine Patient information on sleep disorders:  http://yoursleep.aasmnet.org    CPAP -  WHY AND HOW?                 Continuous positive airway pressure, or CPAP, is the most effective treatment for obstructive sleep apnea. It works by blowing room air, through a mask, to hold your throat open. A decision to use CPAP is a major step forward in the pursuit of a healthier life. The successful use of CPAP will help you breathe easier, sleep better and live healthier. Using CPAP can be a positive experience if you keep these nunez points in mind:  Commitment  CPAP is not a quick fix for your problem. It involves a long-term commitment to  "improve your sleep and your health.    Communication  Stay in close communication with both your sleep doctor and your CPAP supplier. Ask lots of questions and seek help when you need it.    Consistency  Use CPAP all night, every night and for every nap. You will receive the maximum health benefits from CPAP when you use it every time that you sleep. This will also make it easier for your body to adjust to the treatment.    Correction  The first machine and mask that you try may not be the best ones for you. Work with your sleep doctor and your CPAP supplier to make corrections to your equipment selection. Ask about trying a different type of machine or mask if you have ongoing problems. Make sure that your mask is a good fit and learn to use your equipment properly.    Challenge  Tell a family member or close friend to ask you each morning if you used your CPAP the previous night. Have someone to challenge you to give it your best effort.    Connection   Your adjustment to CPAP will be easier if you are able to connect with others who use the same treatment. Ask your sleep doctor if there is a support group in your area for people who have sleep apnea, or look for one on the Internet.  Comfort   Increase your level of comfort by using a saline spray, decongestant or heated humidifier if CPAP irritates your nose, mouth or throat. Use your unit's \"ramp\" setting to slowly get used to the air pressure level. There may be soft pads you can buy that will fit over your mask straps. Look on www.CPAP.com for accessories that can help make CPAP use more comfortable.  Cleaning   Clean your mask, tubing and headgear on a regular basis. Put this time in your schedule so that you don't forget to do it. Check and replace the filters for your CPAP unit and humidifier.    Completion   Although you are never finished with CPAP therapy, you should reward yourself by celebrating the completion of your first month of treatment. Expect " this first month to be your hardest period of adjustment. It will involve some trial and error as you find the machine, mask and pressure settings that are right for you.    Continuation  After your first month of treatment, continue to make a daily commitment to use your CPAP all night, every night and for every nap.    CPAP-Tips to starting with success:  Begin using your CPAP for short periods of time during the day while you watch TV or read.    Use CPAP every night and for every nap. Using it less often reduces the health benefits and makes it harder for your body to get used to it.    Newer CPAP models are virtually silent; however, if you find the sound of your CPAP machine to be bothersome, place the unit under your bed to dampen the sound.     Make small adjustments to your mask, tubing, straps and headgear until you get the right fit. Tightening the mask may actually worsen the leak.  If it leaves significant marks on your face or irritates the bridge of your nose, it may not be the best mask for you.  Speak with the person who supplied the mask and consider trying other masks. Insurances will allow you to try different masks during the first month of starting CPAP.  Insurance also covers a new mask, hose and filter about every 6 months.    Use a saline nasal spray to ease mild nasal congestion. Neti-Pot or saline nasal rinses may also help. Nasal gel sprays can help reduce nasal dryness.  Biotene mouthwash can be helpful to protect your teeth if you experience frequent dry mouth.  Dry mouth may be a sign of air escaping out of your mouth or out of the mask in the case of a full face mask.  Speak with your provider if you expect that is the case.     Take a nasal decongestant to relieve more severe nasal or sinus congestion.  Do not use Afrin (oxymetazoline) nasal spray more than 3 days in a row.  Speak with your sleep doctor if your nasal congestion is chronic.    Use a heated humidifier that fits your  CPAP model to enhance your breathing comfort. Adjust the heat setting up if you get a dry nose or throat, down if you get condensation in the hose or mask.  Position the CPAP lower than you so that any condensation in the hose drains back into the machine rather than towards the mask.    Try a system that uses nasal pillows if traditional masks give you problems.    Clean your mask, tubing and headgear once a week. Make sure the equipment dries fully.    Regularly check and replace the filters for your CPAP unit and humidifier.    Work closely with your sleep provider and your CPAP supplier to make sure that you have the machine, mask and air pressure setting that works best for you. It is better to stop using it and call your provider to solve problems than to lay awake all night frustrated with the device.  Weight Loss:    Weight loss decreases severity of sleep apnea in most people with obesity. For those with mild obesity who have developed snoring with weight gain, even 15-30 pound weight loss can improve and occasionally eliminate sleep apnea.  Structured and life-long dietary and health habits are necessary to lose weight and keep healthier weight levels.     Though there are significant health benefits from weight loss, long-term weight loss is very difficult to achieve- studies show success with dietary management in less than 10% of people. In addition, substantial weight loss may require years of dietary control and may be difficult if patients have severe obesity. In these cases, surgical management may be considered.    If you are interested in methods for weight loss, you should review the options discussed at the National Institutes of Health patient information sites:     http://win.niddk.nih.gov/publications/index.htm  http:/www.health.nih.gov/topic/WeightLossDieting    Bariatric programs offer counseling in all methods of weight  loss:    Http:/www.uofedicalcenter.org/Specialties/WeightLossSurgeryandMedicalMgmt/htm    Your BMI is Body mass index is 39.98 kg/m .    Weight management plan: Specific weight management program called [facility] Medicine discussed - following    Body mass index (BMI) is one way to tell whether you are at a healthy weight, overweight, or obese. It measures your weight in relation to your height.  A BMI of 18.5 to 24.9 is in the healthy range. A person with a BMI of 25 to 29.9 is considered overweight, and someone with a BMI of 30 or greater is considered obese.  Another way to find out if you are at risk for health problems caused by overweight and obesity is to measure your waist. If you are a woman and your waist is more than 35 inches, or if you are a man and your waist is more than 40 inches, your risk of disease may be higher.  More than two-thirds of American adults are considered overweight or obese. Being overweight or obese increases the risk for further weight gain.  Excess weight may lead to heart disease and diabetes. Creating and following plans for healthy eating and physical activity may help you improve your health.    Methods for maintaining or losing weight.    Weight control is part of healthy lifestyle and includes exercise, emotional health, and healthy eating habits.  Careful eating habits lifelong is the mainstay of weight control.  Though there are significant health benefits from weight loss, long-term weight loss with diet alone may be very difficult to achieve- studies show long-term success with dietary management in less than 10% of people. Attaining a healthy weight may be especially difficult to achieve in those with severe obesity. In some cases, medications, devices and surgical management might be considered.    What can you do?    If you are overweight or obese and are interested in methods for weight loss, you should discuss this with your provider. In  addition, we recommend that you review healthy life styles and methods for weight loss available through the National Institutes of Health patient information sites:     http://win.niddk.nih.gov/publications/index.htm

## 2023-04-27 NOTE — NURSING NOTE
Patient pressure chances were not completed in clinic. Due to patient needing to leave for another appointment p[atient stated she will go into [facility] to get the pressure changes, and initiate transfer. Patient declined second BP testing due to time.    Allison [facility] Facilitator

## 2023-04-27 NOTE — Clinical Note
Preop clearance is approved for possible bariatric sleeve surgery from sleep medicine standpoint.  Thanks,  KARI Perez CNP Sleep Medicine

## 2023-04-27 NOTE — PROGRESS NOTES
Outpatient Sleep Medicine Consultation:      Name: Thelma Uribe MRN# [id number]   Age: 57 year old YOB: 1965     Date of Consultation: April 27, 2023  Consultation is requested by: Charis Fuller, WHITLEY  [address] [place] [address] Charis Fuller  Primary care provider: Tiana Macdonald       Reason for Sleep Consult:     Thelma Uribe is sent by Charis Fuller for a sleep consultation regarding LETY evaluation/management, preop clearance for bariatric surgery.    Patient s Reason for visit  Thelma Uribe main reason for visit: Required for weight loss team  Patient states problem(s) started: 1.5 year ago  Thelma Villagrankelli's goals for this visit: Assess cpap which I will bring to be sure settings are correct           Assessment and Plan:     Summary Sleep Diagnoses:  1. LETY (obstructive sleep apnea)  2. Insomnia, unspecified type  3. Class 3 severe obesity with serious comorbidity and body mass index (BMI) of 40.0 to 44.9 in adult, unspecified obesity type (H)  - Adult Sleep Eval & Management  Referral  - Comprehensive DME      Comorbid Diagnoses:  1.  HLD  2.  Postsurgical thyroidectomy hypothyroidism  3.  PCOS  4.  Obesity      Summary Recommendations:  1.  Establish care for mild LETY on PAP therapy.  Patient is doing well on PAP therapy, however, since she obtained her new replacement PAP device through the [employer] recall she feels that the pressure has been different and sometimes not enough at times which may be waking her at night.  Currently in a fixed pressure setting of 9 cm H2O with adequate control of LETY with residual AHI of 2.9/h.  We discussed pressure adjustments to auto-PAP mode with pressure setting 9-12 cm H2O to see if this will help improve nighttime awakenings, which may be associated with REM/REM supine breakthrough apneic events awakening her.  2.  Her insomnia severity index score is 17 today which suggests moderate clinical  insomnia.  This is likely associated with external noise stimuli, ongoing flank pain symptoms, nightmares, and possible breakthrough apneic events waking her at night.  She does use melatonin at times with some benefit.  3.  Clearance for possible bariatric sleeve surgery is approved from Sleep Medicine perspective and recommend continued use of her APAP device pre and postsurgically.  Patient was instructed to bring this with her if she is hospitalized or after her procedure to maintain good oxygenation and improved sleep.  4.  Follow-up in approximately 1 year, sooner should she experience any difficulties.    Orders Placed This Encounter   Procedures     Comprehensive DME         Summary Counseling:    Previous Sleep Testing and PAP download data reviewed  Obstructive Sleep Apnea Reviewed  Complications of Untreated Sleep Apnea Reviewed  Previous recent chart notes    Medical Decision-making:   Educational materials provided in instructions    Total time spent reviewing medical records, history and physical examination, review of previous testing and interpretation as well as documentation on this date: 40 minutes    CC: Charis Fuller          History of Present Illness:     Thelma Uribe is a 57-year-old female with a PMH pertinent for HLD, postsurgical thyroidectomy hypothyroidism, PCOS, LETY, and obesity who presents today for establish care, preop clearance for bariatric surgery in the setting of LETY on CPAP therapy. She reports a feeling of waking up short of breath and waking in middle of night at times. Previously evaluated and followed with [facility] in 2018.  She had been doing well on PAP therapy and has not had follow-up in some time.  She was referred by [facility] for preop clearance for possible gastric sleeve bariatric surgery.    Past Sleep Evaluations: Yes  Previous Study Results: [facility]  Date: 11/29/2018.  Weight 237  pounds.  AHI: 14.2/hr. RDI 14.2/hr. KATHLEEN: 13.0/hr. O2 abhilash 80%.    PAP Download Data:  Respironics DreamStation 2  CPAP 9.0 cmH2O 30 day usage data: 3/28/23 - 4/26/23  100% of days with > 4 hours of use. 0/30 days with no use.   Average use 7 hours 30 minutes per day.   Average Time in Large Leak Per Day: 0 secs.    AHI 2.9 events per hour.     MASK: Nasal mask    DME: online        SLEEP-WAKE SCHEDULE:     Work/School Days: Patient goes to school/work: No   Usually gets into bed at 10 pm  Takes patient about 20-30 mins to fall asleep  Has trouble falling asleep 2 nights per week  Wakes up in the middle of the night Multiple greater than 4 times.  Wakes up due to External stimuli (bed partner, pets, noise, etc);Other  She has trouble falling back asleep Nightly times a week.   It usually takes 1 hour sometimes I can t. Awake at 3 am frequently to get back to sleep  Patient is usually up at 6 am  Uses alarm: No    Weekends/Non-work Days/All Other Days:  Usually gets into bed at 10 -10:30   Takes patient about 20-30 to fall asleep  Patient is usually up at 6 am  Uses alarm: No    Sleep Need  Patient gets  8 hours restless sleep on average   Patient thinks she needs about 7 sleep    Thelma Uribe prefers to sleep in this position(s): Back   Patient states they do the following activities in bed:      Naps  Patient takes a purposeful nap 1 times a week and naps are usually 1 hr in duration  She feels better after a nap: Yes  She dozes off unintentionally 0 days per week  Patient has had a driving accident or near-miss due to sleepiness/drowsiness: No      SLEEP DISRUPTIONS:    Breathing/Snoring  Patient snores:Yes - -off cpap  Other people complain about her snoring: Yes  Patient has been told she stops breathing in her sleep:Yes  She has issues with the following: Morning mouth dryness    Movement:  Patient gets pain, discomfort, with an urge to move:  Yes - r/t flank pain  It happens when she is resting:   Yes  It happens more at night:  Yes  Patient has been told she kicks her legs at night:  No     Behaviors in Sleep:  Thelma Uribe has experienced the following behaviors while sleeping: Recurring Nightmares;Teeth grinding  She has experienced sudden muscle weakness during the day: No      Is there anything else you would like your sleep provider to know: My previous cpap machine was recalled and the new one sent seems different in terms of how I breathe      CAFFEINE AND OTHER SUBSTANCES:    Patient consumes caffeinated beverages per day:  1  Last caffeine use is usually: 7am  List of any prescribed or over the counter stimulants that patient takes:    List of any prescribed or over the counter sleep medication patient takes: Melatonin  List of previous sleep medications that patient has tried:    Patient drinks alcohol to help them sleep: No  Patient drinks alcohol near bedtime: No    Family History:  Patient has a family member been diagnosed with a sleep disorder: Yes  Father  mother and both brothers         SCALES:    EPWORTH SLEEPINESS SCALE         4/20/2023     8:32 AM    Cross Anchor Sleepiness Scale ( MICHEAL Dye  [address] [place] [address] - [place]/English - Final version - 21 Nov 07 - [facility] [place].)   Sitting and reading Slight chance of dozing   Watching TV Slight chance of dozing   Sitting, inactive in a public place (e.g. a theatre or a meeting) Would never doze   As a passenger in a car for an hour without a break Would never doze   Lying down to rest in the afternoon when circumstances permit Slight chance of dozing   Sitting and talking to someone Would never doze   Sitting quietly after a lunch without alcohol Would never doze   In a car, while stopped for a few minutes in traffic Would never doze   Cross Anchor Score (MC) 3   Cross Anchor Score (Sleep) 3         INSOMNIA SEVERITY INDEX (DAKOTAH)          4/20/2023     8:16 AM   Insomnia Severity Index (DAKOTAH)   Difficulty falling asleep 1   Difficulty staying asleep  "2   Problems waking up too early 2   How SATISFIED/DISSATISFIED are you with your CURRENT sleep pattern? 3   How NOTICEABLE to others do you think your sleep problem is in terms of impairing the quality of your life? 3   How WORRIED/DISTRESSED are you about your current sleep problem? 3   To what extent do you consider your sleep problem to INTERFERE with your daily functioning (e.g. daytime fatigue, mood, ability to function at work/daily chores, concentration, memory, mood, etc.) CURRENTLY? 3   DAKOTAH Total Score 17       Guidelines for Scoring/Interpretation:  Total score categories:  0-7 = No clinically significant insomnia   8-14 = Subthreshold insomnia   15-21 = Clinical insomnia (moderate severity)  22-28 = Clinical insomnia (severe)  Used via courtesy of www.[employer].[place].gov with permission from Brody Olivas PhD., [facility]      STOP BANG score: 2        4/27/2023     8:18 AM   STOP BANG Questionnaire (  2008, the American Society of Anesthesiologists, Inc. [employer]   Neck Cir (cm) Clinic: 40 cm   B/P Clinic: 142/97   BMI Clinic: 39.98         GAD7         View : No data to display.                  CAGE-AID         View : No data to display.                CAGE-AID reprinted with permission from the [place] Medical Journal, JESSICA Jade. and JONAS Phelps, \"Conjoint screening questionnaires for alcohol and drug abuse\" [place] Medical Journal 94: 135-140, 1995.      PATIENT HEALTH QUESTIONNAIRE-9 (PHQ - 9)        9/25/2012     2:45 PM   PHQ-9 ([employer])   No Interest In Doing Things 0   Feeling Depressed 0   Trouble Sleeping 0   Tired / No Energy 1   No appetite or Over-Eating 0   Feeling Bad about Self 0   Trouble Concentrating 0   Moving Slow or Restless 0   Suicidal Thoughts 0   Total Score 1       Developed by Rebekah Crowley, Nani Armstrong, Keanu Hernandes and colleagues, with an educational sanchez from [employer]. No permission required to reproduce, translate, " display or distribute.        Allergies:    Allergies   Allergen Reactions     Hydrocodone-Acetaminophen Hives, Itching and Nausea and Vomiting     Oxycodone Hives, Itching, Nausea and Vomiting and Rash       Medications:    Current Outpatient Medications   Medication Sig Dispense Refill     Multiple Vitamin (THERAPEUTIC MULTIVITAMIN PO)        naproxen sodium (ANAPROX) 220 MG tablet Take 220 mg by mouth 2 times daily as needed for moderate pain       Semaglutide-Weight Management (WEGOVY) 1 MG/0.5ML pen Inject 1 mg Subcutaneous once a week 2 mL 0     SYNTHROID 112 MCG tablet Take 1 tablet (112 mcg) by mouth daily 90 tablet 3     topiramate (TOPAMAX) 25 MG tablet TAKE 1 TABLET BY MOUTH TWICE A  tablet 0       Problem List:  Patient Active Problem List    Diagnosis Date Noted     Postsurgical hypothyroidism 06/01/2022     Priority: Medium     Class 3 severe obesity with serious comorbidity and body mass index (BMI) of 40.0 to 44.9 in adult (H) 05/28/2019     Priority: Medium     238lb consult (BMI 40.9)       Hyperlipidemia LDL goal <130 05/28/2019     Priority: Medium     Family history of ischemic heart disease 02/19/2016     Priority: Medium     Thyroid nodule 05/01/2012     Priority: Medium     Endo (Dr. Kwon) thought benign cyst, L thyroidectomy confirmed benign. Dr. Kwon following thyroid levels s/p surgery. Dx w/ hashimoto's thyroiditis per pt.  2/16- has been stable on levothyroxine through Dr. Kwon, can start following here.    **2/18 consult-   Dr. White concerned about various sx's- fatigue, memory loss/concentration issues.  He reviewed her chart, and noted 'her TSH was never high in '15 when synthroid was started, which raises the question if she is truly dependent on thyroid hormone.  Given her lymphocytic thyroiditis, she may have another autoimmune condition as well'.  Will review at f/u appt.       GERD (gastroesophageal reflux disease)      Priority: Medium     prilosec (trial off of it  in '12/'13), 2/16 has been off meds for awhile without meds       CARDIOVASCULAR SCREENING; LDL GOAL LESS THAN 160 10/31/2010     Priority: Medium        Past Medical/Surgical History:  Past Medical History:   Diagnosis Date     Dysthymic disorder     much improved, took celexa x 1mo in '06     Elevated blood pressure reading without diagnosis of hypertension     in '10, much improved with diet/exercise     Exercise induced bronchospasm     takes albuterol very rarely     Female infertility of other specified origin     stopped trying     GERD (gastroesophageal reflux disease)     prilosec for awhile, stable off for yrs (2/16)     History of blood transfusion 12/19    hospitalization, 3 units     Thyroid disease      Past Surgical History:   Procedure Laterality Date     BACK SURGERY  2001    lumbar discectomy     COLONOSCOPY  2018     COLONOSCOPY WITH CO2 INSUFFLATION N/A 06/20/2016    Procedure: COLONOSCOPY WITH CO2 INSUFFLATION;  Surgeon: Miguelito Erazo MD;  Location:  OR     GYN SURGERY       HC REMOVE TONSILS/ADENOIDS,12+ Y/O  Age 14     left navicular fracture   12/17/2019     ORIF right tibial plateau fracture Right 12/26/2019     ORTHOPEDIC SURGERY       percutaneous pinning of foot Left 12/26/2019     right knee external fixator Right 12/17/2019     right leg fasciotomy with wound vac placement  12/17/2019     SOFT TISSUE SURGERY  12/19    right leg fasciotomy     THYROIDECTOMY  05/08/2012    Procedure:THYROIDECTOMY; LEFT THYROID LOBECTOMY; Surgeon:STEPHANIA FERNÁNDEZ; Location: OR     UPPER GI ENDOSCOPY  02/2012    bx's okay, rec different PPI     ZZC APPENDECTOMY  Age 11     ZZC NONSPECIFIC PROCEDURE  1988    Right oophorectomy and dermoid cyst removal     Z NONSPECIFIC PROCEDURE  1997    Partial Left oophorectomy and dermoid cyst removal     Z NONSPECIFIC PROCEDURE  2002    L 4-5 herniated disc w/ laminectomy     ZZ NONSPECIFIC PROCEDURE  04/2003    Midcarpal Instability-Right wrist  reconstruction     ZZC NONSPECIFIC PROCEDURE  1999    Left breast biopsy-benign       Social History:  Social History     Socioeconomic History     Marital status:      Spouse name: Not on file     Number of children: 0     Years of education: Masters De     Highest education level: Not on file   Occupational History     Comment: Physician assistant, [employer]   Tobacco Use     Smoking status: Never     Smokeless tobacco: Never   Vaping Use     Vaping status: Never Used     Passive vaping exposure: Yes   Substance and Sexual Activity     Alcohol use: Yes     Comment: 1-3/week     Drug use: No     Sexual activity: Yes     Partners: Female     Birth control/protection: None     Comment: safe, stable relationship   Other Topics Concern     Parent/sibling w/ CABG, MI or angioplasty before 65F 55M? Yes     Comment: brother, father   Social History Narrative    Social Documentation:        Balanced Diet: YES, too much carbs, otherwise pretty good    Calcium intake: more than 2 per day    Caffeine: 3 cups per day    Exercise:  type of activity rare, active, but not on routine    Sunscreen: Yes    Seatbelts:  Yes    Self Breast Exam:  Yes    Self Testicular Exam: n/a    Physical/Emotional/Sexual Abuse: No    Do you feel safe in your environment? Yes        Cholesterol screen up to date: No-will do today    Eye Exam up to date: Yes    Dental Exam up to date: Yes    Pap smear up to date: Yes-doing today    Mammogram up to date: No: give referral today    Dexa Scan up to date: Does Not Apply    Colonoscopy up to date: Does Not Apply    Immunizations up to date: Yes-td 2000    Glucose screen if over 40:  Yes                             Social Determinants of Health     Financial Resource Strain: Not on file   Food Insecurity: Not on file   Transportation Needs: Not on file   Physical Activity: Not on file   Stress: Not on file   Social Connections: Not on file   Intimate Partner Violence: Not on file    Housing Stability: Not on file       Family History:  Family History   Problem Relation Age of Onset     Cancer Mother 89        Pancreatic     Hypertension Mother      Arthritis Mother      Lipids Mother      Osteoporosis Mother         on meds, did have metarsal fx from slip     Thyroid Disease Mother         Hypothyroid     Coronary Artery Disease Mother         cardiac stents     Other Cancer Mother         pancreatic     C.A.D. Father 65        Quadruple bypass- age 65     Hypertension Father      Prostate Cancer Father      Blood Disease Father         Anti-cardiolipin syndrome, passed away from aneursym      Lipids Father      Thyroid Disease Father         Hypothyroid     Coronary Artery Disease Father         quad bypass     Hyperlipidemia Father      Diabetes Maternal Grandmother         Type 2     Diabetes Paternal Grandfather      Coronary Artery Disease Brother 54        Stent at 54 yrs     Other Cancer Brother         kidney cancer     Coronary Artery Disease Brother         quad bypass     Other Cancer Brother         kidney     Kidney Cancer Brother 49        smoker     Blood Disease Sister         Vonwillenbrand Syndrome     Thyroid Disease Sister      Cancer Sister 56         of pancreatic cancer w/in 3 mo of dx     Other Cancer Sister         pancreatic     Thyroid Disease Sister      Cerebrovascular Disease No family hx of      Breast Cancer No family hx of      Colon Cancer No family hx of      Depression No family hx of      Anxiety Disorder No family hx of      Mental Illness No family hx of      Substance Abuse No family hx of      Anesthesia Reaction No family hx of      Asthma No family hx of      Genetic Disorder No family hx of      Obesity No family hx of      Unknown/Adopted No family hx of        Review of Systems:  A complete review of systems reviewed by me is negative with the exeption of what has been mentioned in the history of present illness.  In the last TWO WEEKS have  "you experienced any of the following symptoms?  Fevers: No  Night Sweats: No  Weight Gain: No  Pain at Night: No  Double Vision: No  Changes in Vision: No  Difficulty Breathing through Nose: No  Sore Throat in Morning: No  Dry Mouth in the Morning: Yes  Shortness of Breath Lying Flat: No  Shortness of Breath With Activity: No  Awakening with Shortness of Breath: No  Increased Cough: No  Heart Racing at Night: No  Swelling in Feet or Legs: No  Diarrhea at Night: No  Heartburn at Night: No  Urinating More than Once at Night: No  Losing Control of Urine at Night: No  Joint Pains at Night: No  Headaches in Morning: No  Weakness in Arms or Legs: No  Depressed Mood: No  Anxiety: No     Physical Examination:  Vitals: BP (!) 142/97   Pulse 70   Resp 16   Ht 1.626 m (5' 4\")   Wt 105.6 kg (232 lb 14.4 oz)   LMP 09/10/2012 (Exact Date)   SpO2 98%   BMI 39.98 kg/m    BMI= Body mass index is 39.98 kg/m .    Neck Cir (cm): 40 cm      GENERAL APPEARANCE: healthy, alert, no distress and cooperative  EYES: Eyes grossly normal to inspection, PERRL, conjunctivae and sclerae normal and lids and lashes normal, wearing eyeglasses  HENT: nose and mouth without ulcers or lesions, oral mucous membranes moist, oropharynx clear and normal cephalic/atraumatic  NECK: no adenopathy, trachea midline and normal to palpation and s/p thyroidectomy  RESP: lungs clear to auscultation - no rales, rhonchi or wheezes  CV: regular rates and rhythm, normal S1 S2, no S3 or S4 and no murmur, click or rub  ABDOMEN: bowel sounds normal, obese and soft, non-tender  MS: extremities normal- no gross deformities noted  NEURO: Alert and oriented x3, normal strength and tone, mentation intact and speech normal  PSYCH: mentation appears normal and affect normal/bright  Mallampati Class: I.  Tonsillar Stage: 0  surgically removed.         Data: All pertinent previous laboratory data reviewed     Recent Labs   Lab Test 02/08/23  0836 10/13/22  1054    138 "   POTASSIUM 4.9 4.5   CHLORIDE 105 104   CO2 26 31   ANIONGAP 9 3   * 99   BUN 19.3 23   CR 0.79 0.80   MICHAEL 9.9 9.5       Recent Labs   Lab Test 10/13/22  1054   WBC 5.5   RBC 5.23*   HGB 14.8   HCT 45.7   MCV 87   MCH 28.3   MCHC 32.4   RDW 12.4          Recent Labs   Lab Test 02/08/23  0836   PROTTOTAL 8.0   ALBUMIN 4.6   BILITOTAL 0.3   ALKPHOS 97   AST 23   ALT 34       TSH (mU/L)   Date Value   07/14/2022 1.73   04/13/2021 1.16   01/15/2020 1.17       No results found for: UAMP, UBARB, BENZODIAZEUR, UCANN, UCOC, OPIT, UPCP    Ferritin   Date/Time Value Ref Range Status   09/25/2012 03:36 PM 6 (L) 10 - 300 ng/mL Final       No results found for: PH, PHARTERIAL, PO2, [id number], SAT, PCO2, HCO3, BASEEXCESS, BARRY, BEB    @LABRCNTIPR(phv:4,pco2v:4,po2v:4,hco3v:4,margie:4,o2per:4)@    Echocardiology: No results found for this or any previous visit (from the past 4320 hour(s)).    Chest x-ray: No results found for this or any previous visit from the past 365 days.      Chest CT: No results found for this or any previous visit from the past 365 days.      PFT: Most Recent B[facility]ze Pulmonary Function Testing    No results found for: 20001  No results found for: 20002  No results found for: 20003  No results found for: 20015  No results found for: 20016  No results found for: 20027  No results found for: 20028  No results found for: 20029  No results found for: 20079  No results found for: 20080  No results found for: 20081  No results found for: 20335  No results found for: 20105  No results found for: 20053  No results found for: 20054  No results found for: 20055      KARI Perez CNP 4/27/2023   Sleep Medicine      This note was written with the assistance of the Dragon voice-ANDalyzeation technology software. The final document, although reviewed, may contain errors. For corrections, please contact the office.

## 2023-05-02 ENCOUNTER — OFFICE VISIT (OUTPATIENT)
Dept: ENDOCRINOLOGY | Facility: CLINIC | Age: 58
End: 2023-05-02
Payer: COMMERCIAL

## 2023-05-02 ENCOUNTER — MYC MEDICAL ADVICE (OUTPATIENT)
Dept: ENDOCRINOLOGY | Facility: CLINIC | Age: 58
End: 2023-05-02

## 2023-05-02 ENCOUNTER — LAB (OUTPATIENT)
Dept: LAB | Facility: CLINIC | Age: 58
End: 2023-05-02
Payer: COMMERCIAL

## 2023-05-02 VITALS
WEIGHT: 230.4 LBS | RESPIRATION RATE: 16 BRPM | BODY MASS INDEX: 39.34 KG/M2 | OXYGEN SATURATION: 99 % | HEART RATE: 78 BPM | DIASTOLIC BLOOD PRESSURE: 91 MMHG | SYSTOLIC BLOOD PRESSURE: 142 MMHG | HEIGHT: 64 IN

## 2023-05-02 DIAGNOSIS — R10.9 FLANK PAIN: ICD-10-CM

## 2023-05-02 DIAGNOSIS — E66.813 CLASS 3 SEVERE OBESITY WITH SERIOUS COMORBIDITY AND BODY MASS INDEX (BMI) OF 40.0 TO 44.9 IN ADULT, UNSPECIFIED OBESITY TYPE (H): Primary | ICD-10-CM

## 2023-05-02 DIAGNOSIS — E66.01 CLASS 3 SEVERE OBESITY WITH SERIOUS COMORBIDITY AND BODY MASS INDEX (BMI) OF 40.0 TO 44.9 IN ADULT, UNSPECIFIED OBESITY TYPE (H): Primary | ICD-10-CM

## 2023-05-02 DIAGNOSIS — E66.813 CLASS 3 SEVERE OBESITY WITH SERIOUS COMORBIDITY AND BODY MASS INDEX (BMI) OF 40.0 TO 44.9 IN ADULT, UNSPECIFIED OBESITY TYPE (H): ICD-10-CM

## 2023-05-02 DIAGNOSIS — E66.01 CLASS 3 SEVERE OBESITY WITH SERIOUS COMORBIDITY AND BODY MASS INDEX (BMI) OF 40.0 TO 44.9 IN ADULT, UNSPECIFIED OBESITY TYPE (H): ICD-10-CM

## 2023-05-02 LAB
ALBUMIN SERPL BCG-MCNC: 4.4 G/DL (ref 3.5–5.2)
ALBUMIN UR-MCNC: NEGATIVE MG/DL
ALP SERPL-CCNC: 87 U/L (ref 35–104)
ALT SERPL W P-5'-P-CCNC: 21 U/L (ref 10–35)
ANION GAP SERPL CALCULATED.3IONS-SCNC: 10 MMOL/L (ref 7–15)
APPEARANCE UR: CLEAR
AST SERPL W P-5'-P-CCNC: 16 U/L (ref 10–35)
BILIRUB SERPL-MCNC: 0.4 MG/DL
BILIRUB UR QL STRIP: NEGATIVE
BUN SERPL-MCNC: 18.2 MG/DL (ref 6–20)
CALCIUM SERPL-MCNC: 10.1 MG/DL (ref 8.6–10)
CHLORIDE SERPL-SCNC: 102 MMOL/L (ref 98–107)
COLOR UR AUTO: YELLOW
CREAT SERPL-MCNC: 0.75 MG/DL (ref 0.51–0.95)
DEPRECATED HCO3 PLAS-SCNC: 28 MMOL/L (ref 22–29)
ERYTHROCYTE [DISTWIDTH] IN BLOOD BY AUTOMATED COUNT: 12.4 % (ref 10–15)
GFR SERPL CREATININE-BSD FRML MDRD: >90 ML/MIN/1.73M2
GLUCOSE SERPL-MCNC: 106 MG/DL (ref 70–99)
GLUCOSE UR STRIP-MCNC: NEGATIVE MG/DL
HBA1C MFR BLD: 5.3 %
HCT VFR BLD AUTO: 43.5 % (ref 35–47)
HGB BLD-MCNC: 14.3 G/DL (ref 11.7–15.7)
HGB UR QL STRIP: NEGATIVE
KETONES UR STRIP-MCNC: NEGATIVE MG/DL
LEUKOCYTE ESTERASE UR QL STRIP: NEGATIVE
LIPASE SERPL-CCNC: 29 U/L (ref 13–60)
MCH RBC QN AUTO: 28.7 PG (ref 26.5–33)
MCHC RBC AUTO-ENTMCNC: 32.9 G/DL (ref 31.5–36.5)
MCV RBC AUTO: 87 FL (ref 78–100)
MUCOUS THREADS #/AREA URNS LPF: PRESENT /LPF
NITRATE UR QL: NEGATIVE
PH UR STRIP: 6 [PH] (ref 5–7)
PLATELET # BLD AUTO: 196 10E3/UL (ref 150–450)
POTASSIUM SERPL-SCNC: 4 MMOL/L (ref 3.4–5.3)
PROT SERPL-MCNC: 7.8 G/DL (ref 6.4–8.3)
RBC # BLD AUTO: 4.99 10E6/UL (ref 3.8–5.2)
RBC URINE: 0 /HPF
SODIUM SERPL-SCNC: 140 MMOL/L (ref 136–145)
SP GR UR STRIP: 1.01 (ref 1–1.03)
UROBILINOGEN UR STRIP-MCNC: NORMAL MG/DL
WBC # BLD AUTO: 8.2 10E3/UL (ref 4–11)
WBC URINE: 1 /HPF

## 2023-05-02 PROCEDURE — 36415 COLL VENOUS BLD VENIPUNCTURE: CPT | Performed by: PATHOLOGY

## 2023-05-02 PROCEDURE — 83690 ASSAY OF LIPASE: CPT | Performed by: PATHOLOGY

## 2023-05-02 PROCEDURE — 99214 OFFICE O/P EST MOD 30 MIN: CPT | Performed by: NURSE PRACTITIONER

## 2023-05-02 PROCEDURE — 81001 URINALYSIS AUTO W/SCOPE: CPT | Performed by: PATHOLOGY

## 2023-05-02 PROCEDURE — 85027 COMPLETE CBC AUTOMATED: CPT | Performed by: PATHOLOGY

## 2023-05-02 PROCEDURE — 83036 HEMOGLOBIN GLYCOSYLATED A1C: CPT | Performed by: NURSE PRACTITIONER

## 2023-05-02 PROCEDURE — 80053 COMPREHEN METABOLIC PANEL: CPT | Performed by: PATHOLOGY

## 2023-05-02 ASSESSMENT — PAIN SCALES - GENERAL: PAINLEVEL: EXTREME PAIN (9)

## 2023-05-02 NOTE — PROGRESS NOTES
Pre-Bariatric Surgery Note    Tiana Macdonald    Date: 2023     RE: Thelma Uribe    MR#: [id number]   : 1965   Date of Visit: May 2, 2023    REASON FOR VISIT: Preoperative evaluation for possible weight loss surgery    Dear Tiana Tompkins,    I had the pleasure of seeing your patient, Thelma Uribe, in my preoperative bariatric clinic.    As you know, she has morbid obesity and is considering weight loss surgery to treat obesity in association with her medical conditions of obesity.  Her goal weight prior to surgery is 238lbs. She has met her required pre-surgery weight. Please refer to initial consult note from date 2023 for patient's weight history and co-morbidities.    Assessment & Plan   Problem List Items Addressed This Visit        Digestive    Class 3 severe obesity with serious comorbidity and body mass index (BMI) of 40.0 to 44.9 in adult (H) - Primary     Has reached goal weight and completed sleep clearance for bariatric surgery. Continues to weigh risks and benefits of moving forward with surgery. Will move forward with meeting with Dr. Leyva. Has appointments scheduled for psych and primary care clearance. Reports that she has been cleared by hematology but unable to see this in chart review.     Reports new right flank pain. See below. Considering dehydration vs gallstones vs pancreatitis. Will order CBC, CMP, lipase, and UA today and will follow up with US if indicated.     Plan:  Labs and UA today to assess for right flank pain  Schedule visit with surgeon  Complete clearances with psych and primary care           Relevant Medications    Semaglutide-Weight Management (WEGOVY) 1 MG/0.5ML pen    Other Relevant Orders    CBC with platelets (Completed)    Comprehensive metabolic panel (Completed)    Lipase (Completed)    Routine UA with micro reflex to culture (Completed)    Hemoglobin A1c (Completed)    Routine UA with micro reflex to culture    US  Abdomen Limited   Other Visit Diagnoses     Flank pain        Relevant Orders    Routine UA with micro reflex to culture    US Abdomen Limited           BP Readings from Last 6 Encounters:   05/02/23 (!) 142/91   04/27/23 (!) 142/97   04/12/23 124/84   03/09/23 119/84   02/08/23 129/85   10/13/22 136/87   \      Interval History: Reports LUQ pain that self resolved and new right flank pain. Pain wraps around into the RUQ. Low level pain is persistent and increased pain seems to wax and wane without specific triggers. Does not seem to be musculoskeletal and not effected by food intake. No nausea or vomiting, some constipation but manageable. Has been taking in 80oz of fluids daily and has increased protein intake.     Reviewed tasklist with patient   Psych clearance - scheduled 5/23-6/29  Sleep clearance - completed 4/27/23  Heme clearance - awaiting coag results drawn 4/13/23  Primary clearance - appointment 5/11/23  Surgeon - need to schedule     Currently taking wegovy 1mg.    Most recent weights:  Wt Readings from Last 4 Encounters:   05/02/23 104.5 kg (230 lb 6.4 oz)   04/27/23 105.6 kg (232 lb 14.4 oz)   04/12/23 104.8 kg (231 lb)   03/09/23 106.6 kg (235 lb)         ROS    Past Medical History:   Diagnosis Date     Dysthymic disorder     much improved, took celexa x 1mo in '06     Elevated blood pressure reading without diagnosis of hypertension     in '10, much improved with diet/exercise     Exercise induced bronchospasm     takes albuterol very rarely     Female infertility of other specified origin     stopped trying     GERD (gastroesophageal reflux disease)     prilosec for awhile, stable off for yrs (2/16)     History of blood transfusion 12/19    hospitalization, 3 units     Thyroid disease        Past Surgical History:   Procedure Laterality Date     BACK SURGERY  2001    lumbar discectomy     COLONOSCOPY  2018     COLONOSCOPY WITH CO2 INSUFFLATION N/A 06/20/2016    Procedure: COLONOSCOPY WITH CO2  INSUFFLATION;  Surgeon: Miguelito Erazo MD;  Location:  OR     GYN SURGERY       HC REMOVE TONSILS/ADENOIDS,12+ Y/O  Age 14     left navicular fracture   12/17/2019     ORIF right tibial plateau fracture Right 12/26/2019     ORTHOPEDIC SURGERY       percutaneous pinning of foot Left 12/26/2019     right knee external fixator Right 12/17/2019     right leg fasciotomy with wound vac placement  12/17/2019     SOFT TISSUE SURGERY  12/19    right leg fasciotomy     THYROIDECTOMY  05/08/2012    Procedure:THYROIDECTOMY; LEFT THYROID LOBECTOMY; Surgeon:STEPHANIA FERNÁNDEZ; Location: OR     UPPER GI ENDOSCOPY  02/2012    bx's okay, rec different PPI     C APPENDECTOMY  Age 11     RUST NONSPECIFIC PROCEDURE  1988    Right oophorectomy and dermoid cyst removal     RUST NONSPECIFIC PROCEDURE  1997    Partial Left oophorectomy and dermoid cyst removal     RUST NONSPECIFIC PROCEDURE  2002    L 4-5 herniated disc w/ laminectomy     RUST NONSPECIFIC PROCEDURE  04/2003    Midcarpal Instability-Right wrist reconstruction     RUST NONSPECIFIC PROCEDURE  1999    Left breast biopsy-benign       Current Outpatient Medications   Medication     Multiple Vitamin (THERAPEUTIC MULTIVITAMIN PO)     naproxen sodium (ANAPROX) 220 MG tablet     Semaglutide-Weight Management (WEGOVY) 1 MG/0.5ML pen     SYNTHROID 112 MCG tablet     No current facility-administered medications for this visit.       Allergies   Allergen Reactions     Hydrocodone-Acetaminophen Hives, Itching and Nausea and Vomiting     Oxycodone Hives, Itching, Nausea and Vomiting and Rash       [facility]/Nurse Visit on 04/13/2023   Component Date Value Ref Range Status     ATP release with Thrombin 04/13/2023 2.12  >=0.50 nm Final     ATP release with 5 Micromolar ADP 04/13/2023 1.94  >=0.40 nm Final     ADP 5 micromolar 04/13/2023 77  >=64 % Final     Epinephrine 10 micromolar 04/13/2023 71  >=63 % Final     Collagen 2.0 micrograms/mL 04/13/2023 86  >=66 % Final     Arachidonic  Acid 0.50 mg/mL 04/13/2023 75  >=63 % Final     Ristocetin 0.50 mg/mL 04/13/2023 6  <=6 % Final     Ristocetin 1.00 mg/mL 04/13/2023 79  >=11 % Final     Ristocetin 1.25 mg/mL 04/13/2023 85  >=76 % Final     Interpretation 04/13/2023    Final                    Value:Normal platelet aggregation study. Maximal platelet aggregation is within normal limits with ADP, Epinephrine, Collagen, Arachidonic Acid, and low and high doses of Ristocetin. ATP release is within normal limits with ADP and Thrombin. These findings are non-diagnostic for a platelet function disorder. If a platelet function disorder is suspected, consider repeat testing and electron microscopy.    Results interpreted by Miryam Escoto MD, PGY-4  I personally reviewed the coagulation test results and agree with the interpretation documented by the resident /fellow and edited/confirmed by me.    Tammie Ayala MD, PhD  [facility]       See Scanned Result 04/13/2023 Specimen received. Reordered and sent to performing laboratory. Report to follow up on completion.   Final     Performing Laboratory 04/13/2023 [place]   Final     Test Name 04/13/2023 platelet electron microscopy   Final     Test Code 04/13/2023 PTEM   Final     [place] Result 04/13/2023 SEE NOTE   Final    Comment:    Test                             Result           Flag  Unit  RefValue  ----------------------------------------------------------------------  Platelet TEM, B    Platelet TEM                   Performed                                Interpretation                 SEE NOTE                                   IMPRESSION:      Platelet transmission electron microscopy (PTEM) studies       demonstrate essentially normal dense granules, alpha       granules and other ultra-structures. No abnormal inclusions       identified.             ELECTRON MICROSCOPIC DESCRIPTION:             PTEM studies are performed. The quality of the EM studies       is adequate.             Whole  "mount PTEM study demonstrates essentially normal mean       dense body count, 3.1 dense granules per platelet (100       platelets are counted; normal adult range is greater than       or equal to 1.2 dense granules per platelet).  There are no       abnormal dense granules identified. Note: Dense body count                                  could be falsely low due to inadequate sample       transportation or storage.             Thin section PTEM study demonstrates normal platelet       ultra-structure.  There is no evidence of alpha granule       deficiency, abnormal membranous inclusions or other       abnormalities.                           Reviewed by: Jovan Haque M.D., Ph.D.             -------------------ADDITIONAL INFORMATION-------------------      This test was developed and its performance characteristics       determined by [facility] in a manner consistent with CLIA       requirements. This test has not been cleared or approved by       the U.S. Food and Drug Administration.             Test Performed by:      [place] [address]      : Reed Mccrary M.D. Ph.D.; CLIA# [id number]       PHYSICAL EXAM:  Objective    BP (!) 142/91 (BP Location: Left arm, Patient Position: Sitting, Cuff Size: Adult Regular)   Pulse 78   Resp 16   Ht 1.626 m (5' 4\")   Wt 104.5 kg (230 lb 6.4 oz)   LMP 09/10/2012 (Exact Date)   SpO2 99%   BMI 39.55 kg/m    BP (!) 142/91 (BP Location: Left arm, Patient Position: Sitting, Cuff Size: Adult Regular)   Pulse 78   Resp 16   Ht 1.626 m (5' 4\")   Wt 104.5 kg (230 lb 6.4 oz)   LMP 09/10/2012 (Exact Date)   SpO2 99%   BMI 39.55 kg/m    Body mass index is 39.55 kg/m .  Physical Exam   GENERAL: Healthy, alert and no distress  EYES: Eyes grossly normal to inspection.  No discharge or erythema, or obvious scleral/conjunctival abnormalities.  RESP: No audible wheeze, cough, or visible " cyanosis.  No visible retractions or increased work of breathing.    SKIN: Visible skin clear. No significant rash, abnormal pigmentation or lesions.  NEURO: Cranial nerves grossly intact.  Mentation and speech appropriate for age.  PSYCH: Mentation appears normal, affect normal/bright, judgement and insight intact, normal speech and appearance well-groomed.        Sleeve Gastrectomy: Risks and Side Effects    The complications or risks of surgery include but are not limited to: death, heart attack, infection in the surgical site (wound infection), abdomen (abscess), bladder (urinary tract infection), lungs (pneumonia), clots in legs (deep vein thrombosis) or lungs (pulmonary emboli),  injury to the bowels or other organs, bowel obstruction, hernia at the incision and gastrointestional bleeding.    More specific risks related to vertical sleeve gastrectomy were detailed at the bariatric informational seminar and include the following: leak at the vertical sleeve staple line, leak at the anastomoses,  nausea, vomiting, and dehydration for several months,  adhesions causing bowel obstruction, rapid weight loss causing a higher rate of gallstone formation during the first 6 months after surgery, decreased absorption of vitamins and protein because of the reduced stomach size, weight regain if inappropriate food intake occurs, stricture, injury to other organs, hernia,  and ulcers.       Side effects of bariatric surgery include but are not limited to: abdominal pain, cramping, bloating, constipation, nausea, vomiting, diarrhea, difficulty swallowing, dehydration, hair loss, excess skin, protein, iron and vitamin deficiencies, heartburn, transfer of addictions, increased anxiety and worsening depression.       I emphasized exercise and activity behavior along with appropriate food choice as the main foundation for weight loss with surgery providing surgical reinforcement of the appropriate behavior set.        Review of  general surgery weight loss process    1. Complete preoperative requirements, including weight loss.  Final weight check to confirm MANDATORY weight loss requirement must be documented on a clinic scale.    2. Discuss prior authorization with .    3. History and physical evaluation by PCP of PAC clinic within 30 days of surgery date, preoperative class, and weight check (weigh-in visit) to be scheduled by patient.  Pre-anesthesia clinic for risk evaluation to be scheduled by anesthesia clinic.    4. We cannot guarantee that patient will qualify for surgery unless all preoperative requirements are met, prior authorization from primary insurance company is granted, and insurance changes do not occur.    5. It is possible for patients to regain all weight after weight loss surgery unless they follow guidelines prescribed by our bariatric center.    6. All patients with gastrointestinal complaints after weight loss surgery must have complaints conveyed to the bariatric team for appropriate treatment.    7. Vitamin deficiencies may develop post-bariatric surgery and annual laboratory testing should be performed.    8. Persistent nausea/vomiting after bariatric surgery entails risk of thiamine deficiency and should be treated early.  Vitamin B12 deficiency may develop, especially after gastric bypass surgery and must be recognized.        If you have any questions about our plans please don't hesitate to contact me.    Sincerely,    [employer] student    Charis Fuller CNP      30 minutes spent by me on the date of the encounter doing chart review, history and exam, documentation and further activities per the note

## 2023-05-02 NOTE — NURSING NOTE
"(   Chief Complaint   Patient presents with     RECHECK     Pre surgery     )    ( Weight: 104.5 kg (230 lb 6.4 oz) )  ( Height: 162.6 cm (5' 4\") )  ( BMI (Calculated): 39.55 )  (   )  (   )  (   )  (   )  (   )  (   )    (   )  (   )  (   )  (   )  (   )  (   )  (   )    (   Patient Active Problem List   Diagnosis     CARDIOVASCULAR SCREENING; LDL GOAL LESS THAN 160     Thyroid nodule     GERD (gastroesophageal reflux disease)     Family history of ischemic heart disease     Class 3 severe obesity with serious comorbidity and body mass index (BMI) of 40.0 to 44.9 in adult (H)     Hyperlipidemia LDL goal <130     Postsurgical hypothyroidism    )  (   Current Outpatient Medications   Medication Sig Dispense Refill     Multiple Vitamin (THERAPEUTIC MULTIVITAMIN PO)        naproxen sodium (ANAPROX) 220 MG tablet Take 220 mg by mouth 2 times daily as needed for moderate pain       Semaglutide-Weight Management (WEGOVY) 1 MG/0.5ML pen Inject 1 mg Subcutaneous once a week 2 mL 0     SYNTHROID 112 MCG tablet Take 1 tablet (112 mcg) by mouth daily 90 tablet 3     topiramate (TOPAMAX) 25 MG tablet TAKE 1 TABLET BY MOUTH TWICE A DAY (Patient not taking: Reported on 5/2/2023) 180 tablet 0    )  ( Diabetes Eval:    )    ( Pain Eval:  Extreme Pain (9) )    ( Wound Eval:       )    (   History   Smoking Status     Never   Smokeless Tobacco     Never    )    ( Signed By:  Maximus Frazier; May 2, 2023; 9:57 AM )  "

## 2023-05-02 NOTE — LETTER
2023       RE: Thelma Uribe  [address]  [facility] [address]     Dear Colleague,    Thank you for referring your patient, Thelma Uribe, to the [facility] [place] [facility]. Please see a copy of my visit note below.      Pre-Bariatric Surgery Note    Tiana Macdonald    Date: 2023     RE: Thelma Uribe    MR#: [id number]   : 1965   Date of Visit: May 2, 2023    REASON FOR VISIT: Preoperative evaluation for possible weight loss surgery    Dear Tiana Tompkins,    I had the pleasure of seeing your patient, Thelma Uribe, in my preoperative bariatric clinic.    As you know, she has morbid obesity and is considering weight loss surgery to treat obesity in association with her medical conditions of obesity.  Her goal weight prior to surgery is 238lbs. She has met her required pre-surgery weight. Please refer to initial consult note from date 2023 for patient's weight history and co-morbidities.    Assessment & Plan   Problem List Items Addressed This Visit          Digestive    Class 3 severe obesity with serious comorbidity and body mass index (BMI) of 40.0 to 44.9 in adult (H) - Primary     Has reached goal weight and completed sleep clearance for bariatric surgery. Continues to weigh risks and benefits of moving forward with surgery. Will move forward with meeting with Dr. Leyva. Has appointments scheduled for psych and primary care clearance. Reports that she has been cleared by hematology but unable to see this in chart review.     Reports new right flank pain. See below. Considering dehydration vs gallstones vs pancreatitis. Will order CBC, CMP, lipase, and UA today and will follow up with US if indicated.     Plan:  Labs and UA today to assess for right flank pain  Schedule visit with surgeon  Complete clearances with psych and primary care           Relevant  Medications    Semaglutide-Weight Management (WEGOVY) 1 MG/0.5ML pen    Other Relevant Orders    CBC with platelets (Completed)    Comprehensive metabolic panel (Completed)    Lipase (Completed)    Routine UA with micro reflex to culture (Completed)    Hemoglobin A1c (Completed)    Routine UA with micro reflex to culture    US Abdomen Limited     Other Visit Diagnoses       Flank pain        Relevant Orders    Routine UA with micro reflex to culture    US Abdomen Limited             BP Readings from Last 6 Encounters:   05/02/23 (!) 142/91   04/27/23 (!) 142/97   04/12/23 124/84   03/09/23 119/84   02/08/23 129/85   10/13/22 136/87   \      Interval History: Reports LUQ pain that self resolved and new right flank pain. Pain wraps around into the RUQ. Low level pain is persistent and increased pain seems to wax and wane without specific triggers. Does not seem to be musculoskeletal and not effected by food intake. No nausea or vomiting, some constipation but manageable. Has been taking in 80oz of fluids daily and has increased protein intake.     Reviewed tasklist with patient   Psych clearance - scheduled 5/23-6/29  Sleep clearance - completed 4/27/23  Heme clearance - awaiting coag results drawn 4/13/23  Primary clearance - appointment 5/11/23  Surgeon - need to schedule     Currently taking wegovy 1mg.    Most recent weights:  Wt Readings from Last 4 Encounters:   05/02/23 104.5 kg (230 lb 6.4 oz)   04/27/23 105.6 kg (232 lb 14.4 oz)   04/12/23 104.8 kg (231 lb)   03/09/23 106.6 kg (235 lb)         ROS    Past Medical History:   Diagnosis Date    Dysthymic disorder     much improved, took celexa x 1mo in '06    Elevated blood pressure reading without diagnosis of hypertension     in '10, much improved with diet/exercise    Exercise induced bronchospasm     takes albuterol very rarely    Female infertility of other specified origin     stopped trying    GERD (gastroesophageal reflux disease)     prilosec for  awhile, stable off for yrs (2/16)    History of blood transfusion 12/19    hospitalization, 3 units    Thyroid disease        Past Surgical History:   Procedure Laterality Date    BACK SURGERY  2001    lumbar discectomy    COLONOSCOPY  2018    COLONOSCOPY WITH CO2 INSUFFLATION N/A 06/20/2016    Procedure: COLONOSCOPY WITH CO2 INSUFFLATION;  Surgeon: Miguelito Erazo MD;  Location:  OR    GYN SURGERY      HC REMOVE TONSILS/ADENOIDS,12+ Y/O  Age 14    left navicular fracture   12/17/2019    ORIF right tibial plateau fracture Right 12/26/2019    ORTHOPEDIC SURGERY      percutaneous pinning of foot Left 12/26/2019    right knee external fixator Right 12/17/2019    right leg fasciotomy with wound vac placement  12/17/2019    SOFT TISSUE SURGERY  12/19    right leg fasciotomy    THYROIDECTOMY  05/08/2012    Procedure:THYROIDECTOMY; LEFT THYROID LOBECTOMY; Surgeon:STEPHANIA FERNÁNDEZ; Location: OR    UPPER GI ENDOSCOPY  02/2012    bx's okay, rec different PPI    [facility] APPENDECTOMY  Age 11    [facility] NONSPECIFIC PROCEDURE  1988    Right oophorectomy and dermoid cyst removal    [facility] NONSPECIFIC PROCEDURE  1997    Partial Left oophorectomy and dermoid cyst removal    [facility] NONSPECIFIC PROCEDURE  2002    L 4-5 herniated disc w/ laminectomy    [facility] NONSPECIFIC PROCEDURE  04/2003    Midcarpal Instability-Right wrist reconstruction    [facility] NONSPECIFIC PROCEDURE  1999    Left breast biopsy-benign       Current Outpatient Medications   Medication    Multiple Vitamin (THERAPEUTIC MULTIVITAMIN PO)    naproxen sodium (ANAPROX) 220 MG tablet    Semaglutide-Weight Management (WEGOVY) 1 MG/0.5ML pen    SYNTHROID 112 MCG tablet     No current facility-administered medications for this visit.       Allergies   Allergen Reactions    Hydrocodone-Acetaminophen Hives, Itching and Nausea and Vomiting    Oxycodone Hives, Itching, Nausea and Vomiting and Rash       [facility]/Nurse Visit on 04/13/2023   Component Date Value Ref Range Status    ATP  release with Thrombin 04/13/2023 2.12  >=0.50 nm Final    ATP release with 5 Micromolar ADP 04/13/2023 1.94  >=0.40 nm Final    ADP 5 micromolar 04/13/2023 77  >=64 % Final    Epinephrine 10 micromolar 04/13/2023 71  >=63 % Final    Collagen 2.0 micrograms/mL 04/13/2023 86  >=66 % Final    Arachidonic Acid 0.50 mg/mL 04/13/2023 75  >=63 % Final    Ristocetin 0.50 mg/mL 04/13/2023 6  <=6 % Final    Ristocetin 1.00 mg/mL 04/13/2023 79  >=11 % Final    Ristocetin 1.25 mg/mL 04/13/2023 85  >=76 % Final    Interpretation 04/13/2023    Final                    Value:Normal platelet aggregation study. Maximal platelet aggregation is within normal limits with ADP, Epinephrine, Collagen, Arachidonic Acid, and low and high doses of Ristocetin. ATP release is within normal limits with ADP and Thrombin. These findings are non-diagnostic for a platelet function disorder. If a platelet function disorder is suspected, consider repeat testing and electron microscopy.    Results interpreted by Miryam Escoto MD, PGY-4  I personally reviewed the coagulation test results and agree with the interpretation documented by the resident /fellow and edited/confirmed by me.    Tammie Ayala MD, PhD  [facility]      See Scanned Result 04/13/2023 Specimen received. Reordered and sent to performing laboratory. Report to follow up on completion.   Final    Performing Laboratory 04/13/2023 [place]   Final    Test Name 04/13/2023 platelet electron microscopy   Final    Test Code 04/13/2023 PTEM   Final    [place] Result 04/13/2023 SEE NOTE   Final    Comment:    Test                             Result           Flag  Unit  RefValue  ----------------------------------------------------------------------  Platelet TEM, B    Platelet TEM                   Performed                                Interpretation                 SEE NOTE                                   IMPRESSION:      Platelet transmission electron microscopy (PTEM) studies     "   demonstrate essentially normal dense granules, alpha       granules and other ultra-structures. No abnormal inclusions       identified.             ELECTRON MICROSCOPIC DESCRIPTION:             PTEM studies are performed. The quality of the EM studies       is adequate.             Whole mount PTEM study demonstrates essentially normal mean       dense body count, 3.1 dense granules per platelet (100       platelets are counted; normal adult range is greater than       or equal to 1.2 dense granules per platelet).  There are no       abnormal dense granules identified. Note: Dense body count                                  could be falsely low due to inadequate sample       transportation or storage.             Thin section PTEM study demonstrates normal platelet       ultra-structure.  There is no evidence of alpha granule       deficiency, abnormal membranous inclusions or other       abnormalities.                           Reviewed by: Jovan Haque M.D., Ph.D.             -------------------ADDITIONAL INFORMATION-------------------      This test was developed and its performance characteristics       determined by [facility] in a manner consistent with CLIA       requirements. This test has not been cleared or approved by       the U.S. Food and Drug Administration.             Test Performed by:      [place] [address]      : Reed Mccrary M.D. Ph.D.; CLIA# [id number]       PHYSICAL EXAM:  Objective    BP (!) 142/91 (BP Location: Left arm, Patient Position: Sitting, Cuff Size: Adult Regular)   Pulse 78   Resp 16   Ht 1.626 m (5' 4\")   Wt 104.5 kg (230 lb 6.4 oz)   LMP 09/10/2012 (Exact Date)   SpO2 99%   BMI 39.55 kg/m    BP (!) 142/91 (BP Location: Left arm, Patient Position: Sitting, Cuff Size: Adult Regular)   Pulse 78   Resp 16   Ht 1.626 m (5' 4\")   Wt 104.5 kg (230 lb 6.4 oz)   LMP 09/10/2012 " (Exact Date)   SpO2 99%   BMI 39.55 kg/m    Body mass index is 39.55 kg/m .  Physical Exam   GENERAL: Healthy, alert and no distress  EYES: Eyes grossly normal to inspection.  No discharge or erythema, or obvious scleral/conjunctival abnormalities.  RESP: No audible wheeze, cough, or visible cyanosis.  No visible retractions or increased work of breathing.    SKIN: Visible skin clear. No significant rash, abnormal pigmentation or lesions.  NEURO: Cranial nerves grossly intact.  Mentation and speech appropriate for age.  PSYCH: Mentation appears normal, affect normal/bright, judgement and insight intact, normal speech and appearance well-groomed.        Sleeve Gastrectomy: Risks and Side Effects    The complications or risks of surgery include but are not limited to: death, heart attack, infection in the surgical site (wound infection), abdomen (abscess), bladder (urinary tract infection), lungs (pneumonia), clots in legs (deep vein thrombosis) or lungs (pulmonary emboli),  injury to the bowels or other organs, bowel obstruction, hernia at the incision and gastrointestional bleeding.    More specific risks related to vertical sleeve gastrectomy were detailed at the bariatric informational seminar and include the following: leak at the vertical sleeve staple line, leak at the anastomoses,  nausea, vomiting, and dehydration for several months,  adhesions causing bowel obstruction, rapid weight loss causing a higher rate of gallstone formation during the first 6 months after surgery, decreased absorption of vitamins and protein because of the reduced stomach size, weight regain if inappropriate food intake occurs, stricture, injury to other organs, hernia,  and ulcers.       Side effects of bariatric surgery include but are not limited to: abdominal pain, cramping, bloating, constipation, nausea, vomiting, diarrhea, difficulty swallowing, dehydration, hair loss, excess skin, protein, iron and vitamin deficiencies,  heartburn, transfer of addictions, increased anxiety and worsening depression.       I emphasized exercise and activity behavior along with appropriate food choice as the main foundation for weight loss with surgery providing surgical reinforcement of the appropriate behavior set.        Review of general surgery weight loss process    1. Complete preoperative requirements, including weight loss.  Final weight check to confirm MANDATORY weight loss requirement must be documented on a clinic scale.    2. Discuss prior authorization with .    3. History and physical evaluation by PCP of PAC clinic within 30 days of surgery date, preoperative class, and weight check (weigh-in visit) to be scheduled by patient.  Pre-anesthesia clinic for risk evaluation to be scheduled by anesthesia clinic.    4. We cannot guarantee that patient will qualify for surgery unless all preoperative requirements are met, prior authorization from primary insurance company is granted, and insurance changes do not occur.    5. It is possible for patients to regain all weight after weight loss surgery unless they follow guidelines prescribed by our bariatric center.    6. All patients with gastrointestinal complaints after weight loss surgery must have complaints conveyed to the bariatric team for appropriate treatment.    7. Vitamin deficiencies may develop post-bariatric surgery and annual laboratory testing should be performed.    8. Persistent nausea/vomiting after bariatric surgery entails risk of thiamine deficiency and should be treated early.  Vitamin B12 deficiency may develop, especially after gastric bypass surgery and must be recognized.        If you have any questions about our plans please don't hesitate to contact me.    Sincerely,    [employer] student    Charis Fuller CNP      30 minutes spent by me on the date of the encounter doing chart review, history and exam, documentation and further activities per  the note

## 2023-05-02 NOTE — PATIENT INSTRUCTIONS
"Thank you for allowing us the privilege of caring for you. We hope we provided you with the excellent service you deserve.   Please let us know if there is anything else we can do for you so that we can be sure you are completely satisfied with your care experience.    To ensure the quality of our services you may be receiving a patient satisfaction survey from an independent patient satisfaction monitoring company.    The greatest compliment you can give is a \"Likely to Recommend\"    Your visit was with Charis Fuller today.    Instructions per today's visit:     Plan:  Labs and UA today to assess for right flank pain  Schedule visit with surgeon  Complete clearances with psych and primary care    _________________________________________________________________________________________________________________________________________________________  Important contact and scheduling information:  Please call our contact center at [phone] to schedule your next appointments.  To find a lab location near you, please call [phone].  For any nursing questions or concerns call Harika Betancourt LPN at [phone] or Filomena Quezada RN at [phone]  Please call during clinic hours Monday through Friday 8:00a - 4:00p if you have questions or you can contact us via [employer] at anytime and we will reply during clinic hours.    Lab results will be communicated through My Chart or letter (if My Chart not used). Please call the clinic if you have not received communication after 1 week or if you have any questions.?  Clinic Fax: [phone]  _________________________________________________________________________________________________________________________________________________________  If you are asked by your clinic team to have your blood pressure checked:  [facility] do offer several locations for blood pressure checks. Please follow the below link to schedule an appointment. Scheduling an appointment at " the pharmacy for a blood pressure check is now preferred.    Appointment Plus ([email or web address])  _________________________________________________________________________________________________________________________________________________________  Meal Replacement Products:    Here is the link to our new e-store where you can purchase our meal replacement products    [facility] E-Store  [employer]/store    The one week starter kit is a great way to sample a variety of products and see what works for you.    If you want more information about the product go to: Fresh Steps Meals  [employer].CloudApps    If you are an employee or [facility] or [facility] please contact your care team for a 10% estore discount    Free Shipping for orders over $75     Benefits of meal replacements products:    Portion and calorie control  Improved nutrition  Structured eating  Simplified food choices  Avoid contact with trigger foods  _________________________________________________________________________________________________________________________________________________________  Interested in working with a health ?  Health coaches work with you to improve your overall health and wellbeing.  They look at the whole person, and may involve discussion of different areas of life, including, but not limited to the four pillars of health (sleep, exercise, nutrition, and stress management). Discuss with your care team if you would like to start working a health .  Health Coaching-3 Pack: Schedule by calling [phone]    $99 for three health coaching visits    Visits may be done in person or via phone    Coaching is a partnership between the  and the client; Coaches do not prescribe or diagnose    Coaching helps inspire the client to reach his/her personal goals  yes   _________________________________________________________________________________________________________________________________________________________  24 Week Healthy Lifestyle Plan:    Our mission in the 24-week Healthy Lifestyle Plan is to provide you with individualized care by giving you the tools, education and support you need to lose weight and maintain a healthy lifestyle. In your 24-week journey, you ll be supported by a dedicated weight loss team that includes registered dietitians, medical weight management providers, health coaches, and nurses -- all with special expertise in weight loss -- to help you every step of the way.     Monthly meetings with your registered dietician or medical weight management provider help to review your progress, update your care plan, and make any adjustments needed to ensure success. Between these visits, weekly and bi-weekly health  visits will help you focus on the four pillars of weight loss -- stress, sleep, nutrition, and exercise -- and how you can best adapt each to achieve sustainable weight loss results.    In addition, you will be given exclusive access to online wellbeing classes through [employer].  Your initial visit will be with a medical weight management provider who will help to understand your weight loss goals and ensure this program is the right fit for you. Please let our team know if you are interested in the 24 week plan by sending a message to your care team or calling [phone] to schedule.  _________________________________________________________________________________________________________________________________________________________    COMPREHENSIVE WEIGHT MANAGEMENT PROGRAM  VIRTUAL SUPPORT GROUPS    For Support Group Information:      We offer support groups for patients who are working on weight loss and considering, preparing for or have had weight loss surgery.   There is no cost for this opportunity.  You are invited to  "attend the?Virtual Support Groups?provided by any of the following locations:    [facility] via [employer] Teams with Dinorah Francisco RN  2.   [place] via [employer] Teams with Miguelito Hammond, PhD, LP  3.   [place] via [employer] Teams with Rebeka Birmingham RN  4.   [facility] via [employer] Teams with Rebeka Lu [facility]    The following Support Group information can also be found on our website:  [email or web address]    [facility] Weight Loss Surgery Support Group    [facility] Weight Loss Surgery Support Group  The support group is a patient-lead forum that meets monthly to share experiences, encouragement and education. It is open to those who have had weight loss surgery, are scheduled for surgery, and those who are considering surgery.   WHEN: This group meets on the 3rd Wednesday of each month from 5:00PM - 6:00PM virtually using Microsoft Teams.   FACILITATOR: Led by Dinorah Francisco RD, CAITLIN, RN, the program's Clinical Coordinator.   TO REGISTER: Please contact the clinic via Many[employer] or call the nurse line directly at [phone] to inform our staff that you would like an invite sent to you and to let us know the email you would like the invite sent to. Prior to the meeting, a link with directions on how to join the meeting will be sent to you.    2022 Meetings  Jessica 15: \"Let's Talk\" a time for the group to share.  July 20: \"Let's Talk\" a time for the group to share.  August 17: \"Let's Talk\" a time for the group to share.  September 21: \"Let's Talk\" a time for the group to share.  October 19: Guest Speaker: Dr Jordan Mccarthy MD Pulmonologist and Sleep Medicine Physician, \"Getting a Good Night's Sleep\".  November 16: \"Let's Talk\" a time for the group to share.  December 21: \"Let's Talk\" a time for the group to share.    [facility] and [facility] Support Groups    Connections: Bariatric Care Support " "Group?  This is open to all [facility] (and those external to this program) pre- and post- operative bariatric surgery patients as well as their support system.   WHEN: This group meets the 2nd Tuesday of each month from 6:30 PM - 8:00 PM virtually using Microsoft Teams.   FACILITATOR: Led by Miguelito Hammond, Ph.D who is a Licensed Psychologist with the [facility] Comprehensive Weight Management Program.   TO REGISTER: Please send an email to Miguelito Hammond, Ph.D.,  at?[email or web address]?if you would like an invitation to the group and to learn about using Microsoft Teams.    2022 Meetings June 14: Connie Stout, MARK, LD at [facility], \"Nutritional Labeling\"  July 12 August 2 (Please Note Date Change)  September 13 October 11 November 8 December 13    Connections: Post-Operative Bariatric Surgery Support Group  This is a support group for [facility] bariatric patients (and those external to [facility]) who have had bariatric surgery and are at least 3 months post-surgery.  WHEN: This support group meets the 4th Wednesday of the month from 11:00 AM - 12:00 PM virtually using Microsoft Teams.   FACILITATOR: Led by Certified Bariatric Nurse, Rebeka Birmingham RN.   TO REGISTER: Please send an email to Rebeka at [email or web address] if you would like an invitation to the group and to learn about using Microsoft Teams.    2022 Meetings June 22 July 27 August 24 September 28 October 26 November 23 December 28      [facility] Healthy Lifestyle Virtual Support Group    Healthy Lifestyle Virtual Support Group?  This is 60 minutes of small group guided discussion, support and resources. All are welcome who want a healthy lifestyle.  WHEN: This group meets monthly on a Friday from 12:30 PM - 1:30 PM virtually using Microsoft Teams.   FACILITATOR: Led by National Board Certified Health and , [facility].   TO " "REGISTER: Please send an email to Rebeka [email or web address] to receive monthly invites to the group or if you have any questions about having a health .  Prior to the meeting, a link with directions on how to join the meeting will be sent to you.    2022 Meetings  June 24: Rebeka Lu, [facility], \"Setting Limits and Boundaries\".  Jul 29: Open Forum  August 26: Guest Speaker: Chio Montez, Registered Dietitian  September 30: Open Forum  October 28th: Guest Speaker: Madeleine Small, [facility], Health , \"Gratitude Practices\".  November 18: Guest Speaker: Risa Holguin RD Registered Dietitian, \"Navigating How to Eat around the Holidays\".  December 16: Guest Speaker: Mary Flowers, [facility], \"Changing Your Relationship with Movement\".    ____________________________________________________________________________________________________________________________________________________________________________  [facility] Get Moving Program  Our team of physical therapists is trained to help you understand and take control of your condition. They will perform a thorough evaluation to determine your ability for activity and develop a customized plan to fit your goals and physical ability.  Scheduling: Unsure if the Get Moving program is right for you? Discuss the program with your medical provider or diabetes educator. You can also call us at [phone] to ask questions or schedule an appointment.   [facility] Get Moving Program  ____________________________________________________________________________________________________________________________________________________________________________  [facility] Diabetes Prevention Program (DPP)  If you have prediabetes and Medicare please contact us via [employer] to learn more about the Diabetes Prevention Program (DPP)  Program Details:   [facility] offers the year-long Diabetes Prevention Program (DPP). The program helps you to make " lifestyle changes that prevent or delay type 2 diabetes by supporting healthy eating, increased physical activity, stress reduction and use of coping skills.   On average, previous [facility] DPP cohorts have lost and maintained at least 5% of their starting weight throughout the program and averaged more than 150 minutes of physical activity per week.  Participants meet weekly for one-hour group sessions over sixteen weeks, every other week for the next 8 weeks, and monthly for the last six months.   A year-long maintenance program is also available for participants who complete the first year.   Location & Cost:   During the COVID-19 Public Health Emergency, the program is offered virtually. When in-person classes can resume, they will be held at [facility].  For people with Medicare, the program is covered in full. A self-pay option will also be available for those with non-Medicare insurance plans.   _________________________________________________________________________________________________________________________________________________________________________________    To work with a Behavioral Health Psychologist:    Call to schedule:    [place] - [phone]  [facility] - [phone]  Ayala Rivas - [phone]  Nani Sheppard - [phone]   Maisha Hogue PhD (cannot accept Medicare) [phone]        Thank you,   [facility] Comprehensive Weight Management Team

## 2023-05-02 NOTE — Clinical Note
Patient said she got a note from hematology saying she was cleared. But, I can't see that note. Psych is scheduled and everything else should be cleared. Doing a RUQ pain workup now. I'll have scheduling get her scheduled with surgeon.  Risa- added as RACHEAL

## 2023-05-02 NOTE — ASSESSMENT & PLAN NOTE
Has reached goal weight and completed sleep clearance for bariatric surgery. Continues to weigh risks and benefits of moving forward with surgery. Will move forward with meeting with Dr. Leyva. Has appointments scheduled for psych and primary care clearance. Reports that she has been cleared by hematology but unable to see this in chart review.     Reports new right flank pain. See below. Considering dehydration vs gallstones vs pancreatitis. Will order CBC, CMP, lipase, and UA today and will follow up with US if indicated.     Plan:  Labs and UA today to assess for right flank pain  Schedule visit with surgeon  Complete clearances with psych and primary care

## 2023-05-04 ENCOUNTER — ANCILLARY PROCEDURE (OUTPATIENT)
Dept: ULTRASOUND IMAGING | Facility: CLINIC | Age: 58
End: 2023-05-04
Attending: NURSE PRACTITIONER
Payer: COMMERCIAL

## 2023-05-04 DIAGNOSIS — E66.01 CLASS 3 SEVERE OBESITY WITH SERIOUS COMORBIDITY AND BODY MASS INDEX (BMI) OF 40.0 TO 44.9 IN ADULT, UNSPECIFIED OBESITY TYPE (H): ICD-10-CM

## 2023-05-04 DIAGNOSIS — R10.9 FLANK PAIN: ICD-10-CM

## 2023-05-04 DIAGNOSIS — E66.813 CLASS 3 SEVERE OBESITY WITH SERIOUS COMORBIDITY AND BODY MASS INDEX (BMI) OF 40.0 TO 44.9 IN ADULT, UNSPECIFIED OBESITY TYPE (H): ICD-10-CM

## 2023-05-04 PROCEDURE — 76705 ECHO EXAM OF ABDOMEN: CPT

## 2023-05-11 ENCOUNTER — VIRTUAL VISIT (OUTPATIENT)
Dept: ENDOCRINOLOGY | Facility: CLINIC | Age: 58
End: 2023-05-11
Payer: COMMERCIAL

## 2023-05-11 DIAGNOSIS — E66.9 OBESITY: ICD-10-CM

## 2023-05-11 DIAGNOSIS — Z71.3 NUTRITIONAL COUNSELING: Primary | ICD-10-CM

## 2023-05-11 PROCEDURE — 97803 MED NUTRITION INDIV SUBSEQ: CPT | Mod: VID | Performed by: DIETITIAN, REGISTERED

## 2023-05-11 PROCEDURE — 99207 PR NO CHARGE LOS: CPT | Mod: VID | Performed by: DIETITIAN, REGISTERED

## 2023-05-11 NOTE — PATIENT INSTRUCTIONS
GOALS:  Relating To Eating:  - Eat slowly (20-30 minutes per meal), chewing foods well (25 chews per bite/applesauce consistency)  - Focus on eating smaller portion sizes at meals and snacks    Relating to beverages:  - Reduce caffeine/carbonation/calorie containing beverages  - Separate fluids from meals by 30 minutes before, during, and after eating  - Drink 48-64 ounces of fluid per day. Small, frequent sips between meals.    Relating to activity:  - Increase activity as able    Chewable Multivitamin Options for After Surgery:  Bariatric Advantage Advanced Multi EA chewable  *This is the one I was referring to that would for sure meet post op recommended needs.  https://www.Kynogon.Lieferheld/item/chewable-advanced-multi-ea  Take 2 chews daily. Additional calcium citrate supplement needed.  - Validation code for Discount on Bariatric Advantage vitamin/mineral supplements: FSWLSBA    BarEyeebraSaatchi Art Multi Complete 45:  *This one also meets needs well. I am not as familiar with it as Bariatric Advantage products but is another great option from what I do know. https://AudioMicro/products/xizoa-enhrizev-80?[email or web address]  Take 2 chews daily. Additional calcium citrate supplement needed.    [place]'s Complete, or generic (with 10 mg iron per chew)   (https://www.Bracketz.Lieferheld/p/kids-39-complete-multivitamin-chewable-tablets-orange-grape-38-cherry-150ct-up-38-up-8482/-/[email or web address]#lnk=sametab)   Take 2 chews per day. Additional B12 and calcium citrate supplements needed. Potential need for additional iron supplement (if needing more than 20 mg iron per day). Is lower in thiamine than ideally recommended      Resources:  Protein Sources for Weight Loss  [email or web address]     Carbohydrates  [email or web address]     Mindful Eating  [email or web address]     Diet Guidelines after Weight Loss Surgery  [email or web address]     Seated Exercises for Arms and Legs (can be  done before or after surgery)  [email or web address]    Post-op Diet Handouts:  Diet Guidelines after Weight-loss Surgery  [email or web address]     Your Stage 1 Diet: Clear Liquids  [email or web address]     Your Stage 2 Diet: Low-fat Full Liquids  [email or web address]     Your Stage 3 Diet: Pureed Foods  [email or web address]     Pureed Pleasures  [email or web address]://[email or web address]    Your Stage 4 Diet: Soft Foods  [email or web address]    Your Stage 5 Diet: Regular Foods  [email or web address]    Supplements after Sleeve Gastrectomy, Gastric Bypass or Single Anastomosis Duodenal Switch  [email or web address]    Keeping Track of Fluids  [email or web address]    Follow up: July 25th at 10:30 am    YURI Miramontes, RD, LD  Clinic #: [phone]

## 2023-05-11 NOTE — LETTER
"5/11/2023       RE: Thelma Uribe  [address]  [facility] [address]     Dear Colleague,    Thank you for referring your patient, Thelma Uribe, to the [facility] [place] at [facility]. Please see a copy of my visit note below.    Thelma Uribe is a 57 year old female who is being evaluated via a billable video visit.      The patient has been notified of following:     \"This video visit will be conducted via a call between you and your physician/provider. We have found that certain health care needs can be provided without the need for an in-person physical exam.  This service lets us provide the care you need with a video conversation.  If a prescription is necessary we can send it directly to your pharmacy.  If lab work is needed we can place an order for that and you can then stop by our lab to have the test done at a later time.    Video visits are billed at different rates depending on your insurance coverage.  Please reach out to your insurance provider with any questions.    If during the course of the call the physician/provider feels a video visit is not appropriate, you will not be charged for this service.\"    Patient has given verbal consent for Video visit? Yes  How would you like to obtain your AVS? MyChart  If you are dropped from the video visit, the video invite should be resent to: Text to cell phone: [phone]  Will anyone else be joining your video visit? No  {If patient encounters technical issues they should call [phone]      Video-Visit Details    Type of service:  Video Visit    Video Start Time: 11:05 am  Video End Time: 11:32 am    Originating Location (pt. Location): Home    Distant Location (provider location): Offsite (providers home)     Platform used for Video Visit: Concard    During this virtual visit the patient is located in [place], patient verifies this as the location " "during the entirety of this visit.     Return Bariatric Nutrition Consultation Note    Reason For Visit: Nutrition Assessment    Thelma Uribe is a 57 year old presenting today for return bariatric nutrition consult.   Pt is interested in laparoscopic sleeve gastrectomy.    This is pt's 3rd required nutrition visits prior to surgery.      Patient referred by Dr. Graham on August 30, 2021 for MWM and by Charis Fuller NP Feb 2023 for WLS.     CO-MORBIDITIES OF OBESITY INCLUDE:       2/6/2023   I have the following health issues associated with obesity: High Cholesterol, Sleep Apnea, Polycystic Ovarian Syndrome, Infertility, Osteoarthritis (joint disease), Hypothyroidism   I have the following symptoms associated with obesity: -     PMH: hip pain, knee pain, fatigue    SUPPORT:      2/6/2023    10:24 AM   Support System Reviewed With Patient   Who do you have in your support network that can be available to help you for the first 2 weeks after surgery? wife, sisters, friends, other family   Who can you count on for support throughout your weight loss surgery journey? wife, sisters, friends       ANTHROPOMETRICS:  Weight 8/30/21: 245 lbs with BMI 40.96    Estimated body mass index is 39.55 kg/m  as calculated from the following:    Height as of 5/2/23: 1.626 m (5' 4\").    Weight as of 5/2/23: 104.5 kg (230 lb 6.4 oz).     Current weight: 227 lbs, pt report home scale     Goal weight: 235 lbs        2/6/2023    10:24 AM   --   I have tried the following methods to lose weight Watching portions or calories    Exercise    Weight Watchers    Pre packaged meals ex: Nutrisystem    Slimfast    Prescription Medications    Physician directed program           2/6/2023    10:24 AM   Weight Loss Questions Reviewed With Patient   How long have you been overweight? Since late 20's to early 40's     Medications:  Wegovy     NUTRITION HISTORY:  Per RD note 8/30/21:     Worked with nutritionist in 2019 - recommended by personal " ". Pushed supplements and supplement testing. Did like nutrition part of it - did elimination diet to determine allergies.  Never got to add back in phase - had bad accident. Fell off a ladder - bed ridden 3 months.      Was doing 1/2 plate greens, protein  Got away from carbs, dairy, alcohol   Wasn't the best way to live - was really strict with but lost weight.   All or nothing person - off of eating plan currrently     Goals per pt: work on implementing an eating plan, discussion on dietary recommendations      Typical day  Breakfast: yogurt,  fruit overnight oats  Lunch: Salad, chicken, hard boiled eggs, veggies  Dinner: chicken, salads, potatoes, meats  Snacks: candy, chocolate malts     Not a fan of logging food. Discussed plate method and intuitive eating.     2/10/23:  Patient attended New S Nutrition Class    3/10/23: Pt states she has been doing well since the nutrition class. Wegovy has been helping reduce appetite and cravings. Has continued to lose weight and has now met goal weight. Working through task list. Doing well with practicing pre-surgery diet guidelines.     5/11/23: Patient had questions on surgical versus non-surgical route. Discussed health benefits of each and long-term weight loss data. Patient is meeting with Dr. Leyva in June to discuss further but plans to continue with surgery route at this time. She does not want to be on meds long-term. Doing well with pre-surgery goals, has made significant lifestyle changes. Did not discuss current diet in detail - time focusing on answering questions related to lifestyle habits post surgery.    Additional information:  FT Physician assistant      Lives with wife - Mayda  No children     \"serious accident dec 2019. Broke both legs, long recovery. I have permanent limits to mobility. I am concerned about my weight and how it will affect me long term. I need to get this weight off my joints.\"        2/6/2023    10:24 AM   Recall Diet " Questions Reviewed With Patient   Describe what you typically consume for breakfast (typical or most recent) don't eat, but would be yogurt, grains, fruit if did or eggs   Describe what you typically consume for lunch (typical or most recent) salad and a protein   Describe what you typically consume for supper (typical or most recent) 2 veggies, a protein, or pasta and protein, or veggies  sweet potatoes, protein   Describe what you typically consume as snacks (typical or most recent) hard boiled, protein bar, fruit, nuts, veggies   How many ounces of water, or other low calorie drinks, do you drink daily (8 oz=1 glass)? 64 oz or more   How many ounces of caffeine (coffee, tea, pop) do you drink daily (8 oz=1 glass)? 16 oz   How many ounces of carbonated (pop, beer, sparkling water) drinks do you drinky daily (8 oz=1 glass)? 0 oz   How many ounces of juice, pop, sweet tea, sports drinks, protein drinks, other sweetened drinks, do you drink daily (8 oz=1 glass)? 8 oz   How many ounces of milk do you drink daily (8 oz=1 glass) 0 oz   How often do you drink alcohol? Monthly or less           2/6/2023    10:24 AM   Eating Habits   Do you have any dietary restrictions? No   Do you currently binge eat (eat a large amount of food in a short time)? No   Are you an emotional eater? Yes   Do you get up to eat after falling asleep? No   What foods do you crave? candy, sweet, Salvadorean           2/6/2023    10:24 AM   Dining Out History Reviewed With Patient   How often do you dine out? Rarely.   Where do you dine out? (select all that apply) take out   What types of food do you order when you dine out? Malian, , Sushi           2/6/2023    10:24 AM   Physical Activity Reviewed With Patient   How often do you exercise? 1 to 2 times per week   What is the duration of your exercise (in minutes)? 45 Minutes   What types of exercise do you do? swimming    home gym    weightlifting   What keeps you from being more active? I am as  active as I can possbily be    I have just had surgery on one or more of my joints       EXERCISE:        2/6/2023    10:24 AM   --   How often do you exercise? 1 to 2 times per week   What is the duration of your exercise (in minutes)? 45 Minutes   What types of exercise do you do? swimming    home gym    weightlifting   What keeps you from being more active? I am as active as I can possdeshawny be    I have just had surgery on one or more of my joints     NUTRITION DIAGNOSIS:  Obesity r/t long history of positive energy balance aeb BMI >30 kg/m2.    INTERVENTION:  Intervention Provided/Education Provided on post-op diet guidelines, vitamins/minerals essential post-operatively, GI anatomy of bariatric surgeries, ways to help prepare for post-op diet guidelines pre-operatively, portion/calorie-control, mindful eating and sources of protein.  Patient demonstrates understanding. Provided pt with list of goals RD contact information.          2/6/2023    10:24 AM   Questions Reviewed With Patient   How ready are you to make changes regarding your weight? Number 1 = Not ready at all to make changes up to 10 = very ready. 10   How confident are you that you can change? 1 = Not confident that you will be successful making changes up to 10 = very confident. 10       Expected Engagement: good    GOALS:  Previous/Continued:  Relating To Eating:  - Eat slowly (20-30 minutes per meal), chewing foods well (25 chews per bite/applesauce consistency)  - Focus on eating smaller portion sizes at meals and snacks    Relating to beverages:  - Reduce caffeine/carbonation/calorie containing beverages  - Separate fluids from meals by 30 minutes before, during, and after eating  - Drink 48-64 ounces of fluid per day. Small, frequent sips between meals.    Relating to activity:  - Increase activity as able    Chewable Multivitamin Options for After Surgery:  Bariatric Advantage Advanced Multi EA chewable  *This is the one I was referring to  that would for sure meet post op recommended needs.  https://www.bariatricPlayMaker CRM.com/item/chewable-advanced-multi-ea  Take 2 chews daily. Additional calcium citrate supplement needed.  - Validation code for Discount on Bariatric Advantage vitamin/mineral supplements: FSWLSBA    Celebrate Multi Complete 45:  *This one also meets needs well. I am not as familiar with it as Bariatric Advantage products but is another great option from what I do know. https://[email or web address] Games/products/rzmhe-zbjidfgp-[email or web address]  Take 2 chews daily. Additional calcium citrate supplement needed.    Walnut's Complete, or generic (with 10 mg iron per chew)   (https://www.HealthWyse.Glassmap/p/kids-39-complete-multivitamin-chewable-tablets-orange-grape-38-cherry-150ct-up-38-up-8482/-/[email or web address]#lnk=sametab)   Take 2 chews per day. Additional B12 and calcium citrate supplements needed. Potential need for additional iron supplement (if needing more than 20 mg iron per day). Is lower in thiamine than ideally recommended      Resources:  Protein Sources for Weight Loss  [email or web address]     Carbohydrates  [email or web address]     Mindful Eating  [email or web address]     Diet Guidelines after Weight Loss Surgery  [email or web address]     Seated Exercises for Arms and Legs (can be done before or after surgery)  [email or web address]    Post-op Diet Handouts:  Diet Guidelines after Weight-loss Surgery  [email or web address]     Your Stage 1 Diet: Clear Liquids  [email or web address]     Your Stage 2 Diet: Low-fat Full Liquids  [email or web address]     Your Stage 3 Diet: Pureed Foods  [email or web address]     Pureed Pleasures  [email or web address]    Your Stage 4 Diet: Soft Foods  [email or web address]    Your Stage 5 Diet: Regular Foods  [email or web address]    Supplements after Sleeve Gastrectomy, Gastric Bypass or  Single Anastomosis Duodenal Switch  [email or web address]    Keeping Track of Fluids  [email or web address]    Follow up: July 25th at 10:30 am    Time spent with patient: 27 minutes  YURI Prabhakar, RD, LD

## 2023-05-11 NOTE — PROGRESS NOTES
"Thelma Uribe is a 57 year old female who is being evaluated via a billable video visit.      The patient has been notified of following:     \"This video visit will be conducted via a call between you and your physician/provider. We have found that certain health care needs can be provided without the need for an in-person physical exam.  This service lets us provide the care you need with a video conversation.  If a prescription is necessary we can send it directly to your pharmacy.  If lab work is needed we can place an order for that and you can then stop by our lab to have the test done at a later time.    Video visits are billed at different rates depending on your insurance coverage.  Please reach out to your insurance provider with any questions.    If during the course of the call the physician/provider feels a video visit is not appropriate, you will not be charged for this service.\"    Patient has given verbal consent for Video visit? Yes  How would you like to obtain your AVS? MyChart  If you are dropped from the video visit, the video invite should be resent to: Text to cell phone: [phone]  Will anyone else be joining your video visit? No  {If patient encounters technical issues they should call [phone]      Video-Visit Details    Type of service:  Video Visit    Video Start Time: 11:05 am  Video End Time: 11:32 am    Originating Location (pt. Location): Home    Distant Location (provider location): Offsite (providers home)     Platform used for Video Visit: JÃ¡ Entendi    During this virtual visit the patient is located in [place], patient verifies this as the location during the entirety of this visit.     Return Bariatric Nutrition Consultation Note    Reason For Visit: Nutrition Assessment    Thelma Uribe is a 57 year old presenting today for return bariatric nutrition consult.   Pt is interested in laparoscopic sleeve gastrectomy.    This is pt's 3rd required nutrition visits prior to " "surgery.      Patient referred by Dr. Graham on August 30, 2021 for MWM and by Charis Fuller NP Feb 2023 for WLS.     CO-MORBIDITIES OF OBESITY INCLUDE:       2/6/2023   I have the following health issues associated with obesity: High Cholesterol, Sleep Apnea, Polycystic Ovarian Syndrome, Infertility, Osteoarthritis (joint disease), Hypothyroidism   I have the following symptoms associated with obesity: -     PMH: hip pain, knee pain, fatigue    SUPPORT:      2/6/2023    10:24 AM   Support System Reviewed With Patient   Who do you have in your support network that can be available to help you for the first 2 weeks after surgery? wife, sisters, friends, other family   Who can you count on for support throughout your weight loss surgery journey? wife, sisters, friends       ANTHROPOMETRICS:  Weight 8/30/21: 245 lbs with BMI 40.96    Estimated body mass index is 39.55 kg/m  as calculated from the following:    Height as of 5/2/23: 1.626 m (5' 4\").    Weight as of 5/2/23: 104.5 kg (230 lb 6.4 oz).     Current weight: 227 lbs, pt report home scale     Goal weight: 235 lbs        2/6/2023    10:24 AM   --   I have tried the following methods to lose weight Watching portions or calories    Exercise    Weight Watchers    Pre packaged meals ex: Nutrisystem    Slimfast    Prescription Medications    Physician directed program           2/6/2023    10:24 AM   Weight Loss Questions Reviewed With Patient   How long have you been overweight? Since late 20's to early 40's     Medications:  Wegovy     NUTRITION HISTORY:  Per RD note 8/30/21:     Worked with nutritionist in 2019 - recommended by . Pushed supplements and supplement testing. Did like nutrition part of it - did elimination diet to determine allergies.  Never got to add back in phase - had bad accident. Fell off a ladder - bed ridden 3 months.      Was doing 1/2 plate greens, protein  Got away from carbs, dairy, alcohol   Wasn't the best way to live - " "was really strict with but lost weight.   All or nothing person - off of eating plan currrently     Goals per pt: work on implementing an eating plan, discussion on dietary recommendations      Typical day  Breakfast: yogurt,  fruit overnight oats  Lunch: Salad, chicken, hard boiled eggs, veggies  Dinner: chicken, salads, potatoes, meats  Snacks: candy, chocolate malts     Not a fan of logging food. Discussed plate method and intuitive eating.     2/10/23:  Patient attended New S Nutrition Class    3/10/23: Pt states she has been doing well since the nutrition class. Wegovy has been helping reduce appetite and cravings. Has continued to lose weight and has now met goal weight. Working through task list. Doing well with practicing pre-surgery diet guidelines.     5/11/23: Patient had questions on surgical versus non-surgical route. Discussed health benefits of each and long-term weight loss data. Patient is meeting with Dr. Leyva in June to discuss further but plans to continue with surgery route at this time. She does not want to be on meds long-term. Doing well with pre-surgery goals, has made significant lifestyle changes. Did not discuss current diet in detail - time focusing on answering questions related to lifestyle habits post surgery.    Additional information:  FT Physician assistant      Lives with wife - Mayda  No children     \"serious accident dec 2019. Broke both legs, long recovery. I have permanent limits to mobility. I am concerned about my weight and how it will affect me long term. I need to get this weight off my joints.\"        2/6/2023    10:24 AM   Recall Diet Questions Reviewed With Patient   Describe what you typically consume for breakfast (typical or most recent) don't eat, but would be yogurt, grains, fruit if did or eggs   Describe what you typically consume for lunch (typical or most recent) salad and a protein   Describe what you typically consume for supper (typical or most recent) " 2 veggies, a protein, or pasta and protein, or veggies  sweet potatoes, protein   Describe what you typically consume as snacks (typical or most recent) hard boiled, protein bar, fruit, nuts, veggies   How many ounces of water, or other low calorie drinks, do you drink daily (8 oz=1 glass)? 64 oz or more   How many ounces of caffeine (coffee, tea, pop) do you drink daily (8 oz=1 glass)? 16 oz   How many ounces of carbonated (pop, beer, sparkling water) drinks do you drinky daily (8 oz=1 glass)? 0 oz   How many ounces of juice, pop, sweet tea, sports drinks, protein drinks, other sweetened drinks, do you drink daily (8 oz=1 glass)? 8 oz   How many ounces of milk do you drink daily (8 oz=1 glass) 0 oz   How often do you drink alcohol? Monthly or less           2/6/2023    10:24 AM   Eating Habits   Do you have any dietary restrictions? No   Do you currently binge eat (eat a large amount of food in a short time)? No   Are you an emotional eater? Yes   Do you get up to eat after falling asleep? No   What foods do you crave? candy, sweet, Kinyarwanda           2/6/2023    10:24 AM   Dining Out History Reviewed With Patient   How often do you dine out? Rarely.   Where do you dine out? (select all that apply) take out   What types of food do you order when you dine out? [place] citizen, Maltese, Sushi           2/6/2023    10:24 AM   Physical Activity Reviewed With Patient   How often do you exercise? 1 to 2 times per week   What is the duration of your exercise (in minutes)? 45 Minutes   What types of exercise do you do? swimming    home gym    weightlifting   What keeps you from being more active? I am as active as I can possbily be    I have just had surgery on one or more of my joints       EXERCISE:        2/6/2023    10:24 AM   --   How often do you exercise? 1 to 2 times per week   What is the duration of your exercise (in minutes)? 45 Minutes   What types of exercise do you do? swimming    home gym    weightlifting   What keeps you  from being more active? I am as active as I can possbily be    I have just had surgery on one or more of my joints     NUTRITION DIAGNOSIS:  Obesity r/t long history of positive energy balance aeb BMI >30 kg/m2.    INTERVENTION:  Intervention Provided/Education Provided on post-op diet guidelines, vitamins/minerals essential post-operatively, GI anatomy of bariatric surgeries, ways to help prepare for post-op diet guidelines pre-operatively, portion/calorie-control, mindful eating and sources of protein.  Patient demonstrates understanding. Provided pt with list of goals RD contact information.          2/6/2023    10:24 AM   Questions Reviewed With Patient   How ready are you to make changes regarding your weight? Number 1 = Not ready at all to make changes up to 10 = very ready. 10   How confident are you that you can change? 1 = Not confident that you will be successful making changes up to 10 = very confident. 10       Expected Engagement: good    GOALS:  Previous/Continued:  Relating To Eating:  - Eat slowly (20-30 minutes per meal), chewing foods well (25 chews per bite/applesauce consistency)  - Focus on eating smaller portion sizes at meals and snacks    Relating to beverages:  - Reduce caffeine/carbonation/calorie containing beverages  - Separate fluids from meals by 30 minutes before, during, and after eating  - Drink 48-64 ounces of fluid per day. Small, frequent sips between meals.    Relating to activity:  - Increase activity as able    Chewable Multivitamin Options for After Surgery:  Bariatric Advantage Advanced Multi EA chewable  *This is the one I was referring to that would for sure meet post op recommended needs.  https://www.bariatricadvantage.com/item/chewable-advanced-multi-ea  Take 2 chews daily. Additional calcium citrate supplement needed.  - Validation code for Discount on Bariatric Advantage vitamin/mineral supplements: FSWLSBA    Celebrate Multi Complete 45:  *This one also meets needs  well. I am not as familiar with it as Bariatric Advantage products but is another great option from what I do know. https://Pulmologix/products/rbnxt-bvsiukbd-[email or web address]?[email or web address]  Take 2 chews daily. Additional calcium citrate supplement needed.    Amberson's Complete, or generic (with 10 mg iron per chew)   (https://www.Local Voice Media/p/kids-39-complete-multivitamin-chewable-tablets-orange-grape-38-cherry-150ct-up-38-up-8482/-/A-[email or web address]#lnk=sametab)   Take 2 chews per day. Additional B12 and calcium citrate supplements needed. Potential need for additional iron supplement (if needing more than 20 mg iron per day). Is lower in thiamine than ideally recommended      Resources:  Protein Sources for Weight Loss  [email or web address]     Carbohydrates  [email or web address]     Mindful Eating  [email or web address]     Diet Guidelines after Weight Loss Surgery  [email or web address]     Seated Exercises for Arms and Legs (can be done before or after surgery)  [email or web address]    Post-op Diet Handouts:  Diet Guidelines after Weight-loss Surgery  [email or web address]     Your Stage 1 Diet: Clear Liquids  [email or web address]     Your Stage 2 Diet: Low-fat Full Liquids  [email or web address]     Your Stage 3 Diet: Pureed Foods  [email or web address]     Pureed Pleasures  [email or web address]    Your Stage 4 Diet: Soft Foods  [email or web address]    Your Stage 5 Diet: Regular Foods  [email or web address]    Supplements after Sleeve Gastrectomy, Gastric Bypass or Single Anastomosis Duodenal Switch  [email or web address]    Keeping Track of Fluids  [email or web address]    Follow up: July 25th at 10:30 am    Time spent with patient: 27 minutes  YURI Prabhakar, RD, LD

## 2023-05-17 ASSESSMENT — ANXIETY QUESTIONNAIRES
2. NOT BEING ABLE TO STOP OR CONTROL WORRYING: NOT AT ALL
1. FEELING NERVOUS, ANXIOUS, OR ON EDGE: NOT AT ALL
7. FEELING AFRAID AS IF SOMETHING AWFUL MIGHT HAPPEN: NOT AT ALL
GAD7 TOTAL SCORE: 0
GAD7 TOTAL SCORE: 0
7. FEELING AFRAID AS IF SOMETHING AWFUL MIGHT HAPPEN: NOT AT ALL
IF YOU CHECKED OFF ANY PROBLEMS ON THIS QUESTIONNAIRE, HOW DIFFICULT HAVE THESE PROBLEMS MADE IT FOR YOU TO DO YOUR WORK, TAKE CARE OF THINGS AT HOME, OR GET ALONG WITH OTHER PEOPLE: NOT DIFFICULT AT ALL
8. IF YOU CHECKED OFF ANY PROBLEMS, HOW DIFFICULT HAVE THESE MADE IT FOR YOU TO DO YOUR WORK, TAKE CARE OF THINGS AT HOME, OR GET ALONG WITH OTHER PEOPLE?: NOT DIFFICULT AT ALL
5. BEING SO RESTLESS THAT IT IS HARD TO SIT STILL: NOT AT ALL
3. WORRYING TOO MUCH ABOUT DIFFERENT THINGS: NOT AT ALL
6. BECOMING EASILY ANNOYED OR IRRITABLE: NOT AT ALL
4. TROUBLE RELAXING: NOT AT ALL

## 2023-05-19 ENCOUNTER — TELEPHONE (OUTPATIENT)
Dept: ENDOCRINOLOGY | Facility: CLINIC | Age: 58
End: 2023-05-19
Payer: COMMERCIAL

## 2023-05-19 NOTE — TELEPHONE ENCOUNTER
SHAILESH and sent my chart for following appointments:    Schedule next available with Dr Leyva to discuss bariatric surgery.    Schedule 3 mo follow-up with Charis Fuller.

## 2023-05-23 ENCOUNTER — VIRTUAL VISIT (OUTPATIENT)
Dept: PSYCHOLOGY | Facility: CLINIC | Age: 58
End: 2023-05-23
Attending: NURSE PRACTITIONER
Payer: COMMERCIAL

## 2023-05-23 DIAGNOSIS — E66.01 CLASS 3 SEVERE OBESITY WITH SERIOUS COMORBIDITY AND BODY MASS INDEX (BMI) OF 40.0 TO 44.9 IN ADULT, UNSPECIFIED OBESITY TYPE (H): ICD-10-CM

## 2023-05-23 DIAGNOSIS — E66.813 CLASS 3 SEVERE OBESITY WITH SERIOUS COMORBIDITY AND BODY MASS INDEX (BMI) OF 40.0 TO 44.9 IN ADULT, UNSPECIFIED OBESITY TYPE (H): ICD-10-CM

## 2023-05-23 DIAGNOSIS — G47.33 OSA (OBSTRUCTIVE SLEEP APNEA): ICD-10-CM

## 2023-05-23 PROCEDURE — 90791 PSYCH DIAGNOSTIC EVALUATION: CPT | Mod: VID | Performed by: PSYCHOLOGIST

## 2023-05-23 ASSESSMENT — COLUMBIA-SUICIDE SEVERITY RATING SCALE - C-SSRS
ATTEMPT LIFETIME: NO
2. HAVE YOU ACTUALLY HAD ANY THOUGHTS OF KILLING YOURSELF?: NO
TOTAL  NUMBER OF ABORTED OR SELF INTERRUPTED ATTEMPTS LIFETIME: NO
TOTAL  NUMBER OF INTERRUPTED ATTEMPTS LIFETIME: NO
6. HAVE YOU EVER DONE ANYTHING, STARTED TO DO ANYTHING, OR PREPARED TO DO ANYTHING TO END YOUR LIFE?: NO
1. HAVE YOU WISHED YOU WERE DEAD OR WISHED YOU COULD GO TO SLEEP AND NOT WAKE UP?: NO

## 2023-05-23 ASSESSMENT — PATIENT HEALTH QUESTIONNAIRE - PHQ9
SUM OF ALL RESPONSES TO PHQ QUESTIONS 1-9: 1
SUM OF ALL RESPONSES TO PHQ QUESTIONS 1-9: 1
10. IF YOU CHECKED OFF ANY PROBLEMS, HOW DIFFICULT HAVE THESE PROBLEMS MADE IT FOR YOU TO DO YOUR WORK, TAKE CARE OF THINGS AT HOME, OR GET ALONG WITH OTHER PEOPLE: NOT DIFFICULT AT ALL

## 2023-05-23 NOTE — PROGRESS NOTES
[facility]      PATIENT'S NAME: Thelma Uribe  PREFERRED NAME: Luz Maria  PRONOUNS: She/her  MRN: [id number]  : 1965  ADDRESS: [address] [place] [id number]  ACCT. NUMBER:  [id number]  DATE OF SERVICE: 23  START TIME: 11:02am  END TIME: 12:25pm  PREFERRED PHONE: [phone]  May we leave a program related message: Yes  SERVICE MODALITY:  Video Visit:      Provider verified identity through the following two step process.  Patient provided:  Patient photo and Patient     Telemedicine Visit: The patient's condition can be safely assessed and treated via synchronous audio and visual telemedicine encounter.      Reason for Telemedicine Visit: Services only offered telehealth    Originating Site (Patient Location): Patient's home    Distant Site (Provider Location): Provider Remote Setting- Home Office    Consent:  The patient/guardian has verbally consented to: the potential risks and benefits of telemedicine (video visit) versus in person care; bill my insurance or make self-payment for services provided; and responsibility for payment of non-covered services.     Patient would like the video invitation sent by:  My Chart    Mode of Communication:  Video Conference via [facility]    Distant Location (Provider):  Off-site    As the provider I attest to compliance with applicable laws and regulations related to telemedicine.    Encouraged patient to contact their insurance provider to learn more about their personal cost for the assessment process.      Reviewed the limits of confidentiality.     Reviewed [facility] Attendance Agreement. Patient expressed understanding that if patient no shows or cancels with less than 24 hours for an appointment, twice within 6 months, then the patient will not be allowed to schedule for six months.     UNIVERSAL ADULT Mental Health DIAGNOSTIC ASSESSMENT    Identifying Information:  Patient is a 57 year old,  " individual.  Patient was referred for an assessment by [facility]. Patient attended the session alone.    Chief Complaint:   Patient was referred for an evaluation by the [facility]. Patient is presenting for a psychological evaluation as a routine part of the process for pursuing weight loss surgery. Patient reports they have attempted to lose weight in the past through Jennifer David, Weight Watchers, an exercise program, prescription weight loss medications, and watching portion sizes. Reported being currently on Wegovy which is working well for her in terms of weight loss medication, in addition to the healthy lifestyle changes she has made since November of 2022. Patient indicated having quit alcohol use (to decrease caloric intake), started cooking at home more, has been making healthy food choices (including more vegetables and proteins, while cutting out carbs), and increased physical activity with swimming. Patient reported that she \"was on the fence\" regarding pursuing bariatric surgery versus relying solely on lifestyle changes. She is considering gastric bypass surgery at this point.     Patient reported experiencing a traumatic injury to both of her legs upon falling off the ladder in 2019. She reportedly had 6 surgery for her legs since, and \"I have to get weight off of my legs\" in order to help her legs' recovery. The patient reports they believe the surgery will benefit them by \"improving my health and fitness in the long term\" through weight loss, and improving joint functioning and legs' health since the accident. Patient noted having an \"empowering experience with eating healthier and benefits I am already seeing\" with decreased cholesterol and A1C levels, since the healthy lifestyle changes she has made starting in November of 2022. Patient identified being able to walk half a mile since her " "recovery process (\"used to do 6 miles of hiking\" prior to her injury). Patient indicated currently swimming, and weight training- had a home gym installed in the house for her recovery.     Patient reported that the most she weighed was 250 lbs in 2022, and the lowest she weighed as an adult was 150 to 155 lbs in college. Reported that weight gain started late 20s early 30s, and went up progressively- \"the last 50 pounds was in the last 15 years.\" Patient retired recently and is adjusting to physical limitation and orthopedic surgical intervention recovery, with the last surgery being done this past November. Patient reported having lost weight since making lifestyle changes starting 2022, and reached her weight loss goal. Patient weighed 230 pounds as of May 2nd.    Patient described that work stress was a factor in weight gain prior to retiring. 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 Reported that the combination of stress, unhealthy food choices, and \"eating out a lot\" added to weight gain. Now she is retired, and no longer has to eat at \"random times\" as she used to when having to do surgery shifts.          Social/Family History:  Patient reported they grew up in [facility].  They were raised by biological parents  .  Parents were always together.  Patient reported that their childhood was \"[place] town, big family, [employer] [place] Patient described their current relationships with family of origin as \"very good\" with siblings; Parents are .     The patient describes their cultural background as .  Cultural influences and impact on patient's life structure, values, norms, and healthcare: [place] and being a lesbian.  Contextual influences on patient's health include: none. These factors will be addressed in the Preliminary Treatment plan. Patient " "identified their preferred language to be English. Patient reported they do not need the assistance of an  or other support involved in therapy.     Patient reported had no significant delays in developmental tasks.  Patient's highest education level was graduate school  - physical therapy, then physician assistant for surgery.  Patient identified the following learning problems: none reported.  Modifications will not be used to assist communication in therapy.  Patient reports they are able to understand written materials.    Patient reported the following relationship history: \"7 years and 6 years dating relationships\" prior to meeting spouse.  Patient's current relationship status is  for 4 years and has a \"great relationship\" with her spouse who is \"very supportive.\"  Patient identified their sexual orientation as arvizu.  Patient reported having no child(nahomi). Patient identified siblings; pets; friends; spouse as part of their support system.  Patient identified the quality of these relationships as stable and meaningful.     Patient's current living/housing situation involves staying in own home.  The immediate members of family and household include Mayda, Zabrina,Wife and they report that housing is stable.    Patient is currently retired.  Patient reports their finances are obtained through employment; spouse. Patient does not identify finances as a current stressor.      Patient reported that they have not been involved with the legal system. Patient does not report being under probation/ parole/ jurisdiction. They are not under any current court jurisdiction. .    Patient's Strengths and Limitations:  Patient identified the following strengths or resources that will help them succeed in treatment: commitment to health and well being, friends / good social support, family support, neighbor support, intelligence, mindfulness practice, motivation, and perseverance. Things that may interfere with " "the patient's success in treatment include: \"once I make the decision, I would do well with it,\" but she is still contemplating bariatric surgery risks and \"a little of stigma\" around this surgery.     Assessments:  The following assessments were completed by patient for this visit:  PHQ9:       6/14/2006     7:45 AM 2/21/2012     2:30 PM 9/25/2012     2:45 PM   PHQ-9 SCORE   PHQ-9 Total Score 11 0 1     GAD7:       5/17/2023    11:45 AM   KORIN-7 SCORE   Total Score 0 (minimal anxiety)   Total Score 0     CAGE-AID:       5/17/2023    12:04 PM   CAGE-AID Total Score   Total Score 1   Total Score MyChart 1 (A total score of 2 or greater is considered clinically significant)     PROMIS 10-Global Health (all questions and answers displayed):       5/17/2023    12:03 PM   PROMIS 10   In general, would you say your health is: Very good   In general, would you say your quality of life is: Very good   In general, how would you rate your physical health? Very good   In general, how would you rate your mental health, including your mood and your ability to think? Very good   In general, how would you rate your satisfaction with your social activities and relationships? Very good   In general, please rate how well you carry out your usual social activities and roles Very good   To what extent are you able to carry out your everyday physical activities such as walking, climbing stairs, carrying groceries, or moving a chair? Mostly   In the past 7 days, how often have you been bothered by emotional problems such as feeling anxious, depressed, or irritable? Never   In the past 7 days, how would you rate your fatigue on average? Mild   In the past 7 days, how would you rate your pain on average, where 0 means no pain, and 10 means worst imaginable pain? 4   In general, would you say your health is: 4   In general, would you say your quality of life is: 4   In general, how would you rate your physical health? 4   In general, how " would you rate your mental health, including your mood and your ability to think? 4   In general, how would you rate your satisfaction with your social activities and relationships? 4   In general, please rate how well you carry out your usual social activities and roles. (This includes activities at home, at work and in your community, and responsibilities as a parent, child, spouse, employee, friend, etc.) 4   To what extent are you able to carry out your everyday physical activities such as walking, climbing stairs, carrying groceries, or moving a chair? 4   In the past 7 days, how often have you been bothered by emotional problems such as feeling anxious, depressed, or irritable? 1   In the past 7 days, how would you rate your fatigue on average? 2   In the past 7 days, how would you rate your pain on average, where 0 means no pain, and 10 means worst imaginable pain? 4   Global Mental Health Score 17   Global Physical Health Score 15   PROMIS TOTAL - SUBSCORES 32     [place] Suicide Severity Rating Scale (Lifetime/Recent)       View : No data to display.                Personal and Family Medical History:  Patient does not report a family history of mental health concerns.  Patient reports family history includes Arthritis in her mother; Blood Disease in her father and sister; C.A.D. (age of onset: 65) in her father; Cancer (age of onset: 56) in her sister; Cancer (age of onset: 89) in her mother; Coronary Artery Disease in her brother, father, and mother; Coronary Artery Disease (age of onset: 54) in her brother; Diabetes in her maternal grandmother and paternal grandfather; Hyperlipidemia in her father; Hypertension in her father and mother; Kidney Cancer (age of onset: 49) in her brother; Lipids in her father and mother; Osteoporosis in her mother; Other Cancer in her brother, brother, mother, and sister; Prostate Cancer in her father; Thyroid Disease in her father, mother, sister, and sister..     Patient  "does report Mental Health Diagnosis and/or Treatment.  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  Patient reported symptoms began in 2005 when adjusting to work and resolved in 2006 or so. 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 Psychiatric Hospitalizations: None.  Patient denies a history of civil commitment.  Currently, patient is not receiving mental health services.        Patient has had a physical exam to rule out medical causes for current symptoms.  Date of last physical exam was within the past year. Patient was encouraged to follow up with PCP if symptoms were to develop. The patient has a [facility] Primary Care Provider, who is named Tiana Macdonald.  Patient reports no current medical concerns: \"I got an evaluation recently (for LETY); I got a new mask for C-PAP,\" and went though recent labs.  Patient reports no pain concerns; explained that \"there is some level of pain every day\" since her legs' injury, but she played golf yesterday & did it with a motorized cart- had to walk some which lead to soreness; patient noted that she still does what she enjoys and does not let the injury or pain stop her.  Patient does not want help addressing pain concerns. There are not significant appetite / nutritional concerns / weight changes.   Patient does not report a history of head injury / trauma / cognitive impairment.      Patient reports current meds as:   Outpatient Medications Marked as Taking for the 5/23/23 encounter (Virtual Visit) with Bee Butler LP   Medication Sig     Multiple Vitamin (THERAPEUTIC MULTIVITAMIN " "PO)      naproxen sodium (ANAPROX) 220 MG tablet Take 220 mg by mouth 2 times daily as needed for moderate pain     Semaglutide-Weight Management (WEGOVY) 1 MG/0.5ML pen Inject 1 mg Subcutaneous once a week     Semaglutide-Weight Management (WEGOVY) 1.7 MG/0.75ML pen Inject 1.7 mg Subcutaneous once a week     SYNTHROID 112 MCG tablet Take 1 tablet (112 mcg) by mouth daily       Medication Adherence:  Patient reports taking.  taking prescribed medications as prescribed.    Patient Allergies:    Allergies   Allergen Reactions     Hydrocodone-Acetaminophen Hives, Itching and Nausea and Vomiting     Oxycodone Hives, Itching, Nausea and Vomiting and Rash       Medical History:    Past Medical History:   Diagnosis Date     Dysthymic disorder     much improved, took celexa x 1mo in '06     Elevated blood pressure reading without diagnosis of hypertension     in '10, much improved with diet/exercise     Exercise induced bronchospasm     takes albuterol very rarely     Female infertility of other specified origin     stopped trying     GERD (gastroesophageal reflux disease)     prilosec for awhile, stable off for yrs (2/16)     History of blood transfusion 12/19    hospitalization, 3 units     Thyroid disease          Current Mental Status Exam:   Appearance:  Appropriate    Eye Contact:  Good   Psychomotor:  Normal       Gait / station:  Unable to assess due to video visit/sitting up in a chair  Attitude / Demeanor: Cooperative  Interested Pleasant  Speech      Rate / Production: Normal/ Responsive      Volume:  Normal  volume      Language:  intact  Mood:   Normal \"Good\"  Affect:   Appropriate    Thought Content: Clear   Thought Process: Coherent       Associations: No loosening of associations  Insight:   Good   Judgment:  Intact   Orientation:  All  Attention/concentration: Good      Substance Use:  Patient did report a family history of substance use concerns; see medical history section for details- brother has a " history of alcohol use disorder, but has been sober for some time.  Patient has not received chemical dependency treatment in the past.  Patient has not ever been to detox.      Patient is not currently receiving any chemical dependency treatment.           Substance History of use Age of first use Date of last use     Pattern and duration of use (include amounts and frequency)   Alcohol used in the past   11 10/29/22 REPORTS SUBSTANCE USE: N/A   Pt. reported previously using alcohol 2 to 3 times a week socially, and having two drinks at a time. Pt. decided to stop alcohol use in November of 2022 for weight loss reason, as well as part of her health & wellness goal.   Cannabis   never used     REPORTS SUBSTANCE USE: N/A     Amphetamines   never used     REPORTS SUBSTANCE USE: N/A   Cocaine/crack    never used       REPORTS SUBSTANCE USE: N/A   Hallucinogens never used         REPORTS SUBSTANCE USE: N/A   Inhalants never used         REPORTS SUBSTANCE USE: N/A   Heroin never used         REPORTS SUBSTANCE USE: N/A   Other Opiates never used     REPORTS SUBSTANCE USE: N/A   Benzodiazepine   never used     REPORTS SUBSTANCE USE: N/A   Barbiturates never used     REPORTS SUBSTANCE USE: N/A   Over the counter meds never used     REPORTS SUBSTANCE USE: N/A   Caffeine currently use 17   REPORTS SUBSTANCE USE: reports using substance 1 times per day  and has 1 Italian Espresso at a time.   Patient reports heaviest use was between 49 and 54. Patient reportedly cut back on caffeine use since her legs injury in 2019.   Nicotine  never used     REPORTS SUBSTANCE USE: N/A   Other substances not listed above:  Identify:  never used     REPORTS SUBSTANCE USE: N/A     Patient reported the following problems as a result of their substance use: no problems, not applicable.    Substance Use: No symptoms    Based on the negative CAGE score and clinical interview there  are not indications of drug or alcohol abuse.      Significant  "Losses / Trauma / Abuse / Neglect Issues:   Patient did not serve in the .  There are indications or report of significant loss, trauma, abuse or neglect issues related to: death of father in 2004 was hard at the time & having the responsibility of her mother who passed away a year and half ago; ladder injury in 2019 was the most impacting - \"I almost lost my leg, and worked hard to come back from those limitations, and in accepting current limits\" which she is still adjusting to.   Concerns for possible neglect are not present.     Safety Assessment:   Patient denies current homicidal ideation and behaviors.  Patient denies current self-injurious ideation and behaviors.    Patient denied risk behaviors associated with substance use.  Patient denies any high risk behaviors associated with mental health symptoms.  Patient reports the following current concerns for their personal safety: None.  Patient reports there are not firearms in the house.     History of Safety Concerns:  Patient denied a history of homicidal ideation.     Patient denied a history of personal safety concerns.    Patient denied a history of assaultive behaviors.    Patient denied a history of sexual assault behaviors.     Patient denied a history of risk behaviors associated with substance use.  Patient denies any history of high risk behaviors associated with mental health symptoms.  Patient reports the following protective factors: forward or future oriented thinking; dedication to family or friends; safe and stable environment; commitment to well being; positive social skills; financial stability; sense of personal control or determination    Risk Plan:  See Recommendations for Safety and Risk Management Plan    Review of Symptoms per patient report:   Depression: Sleep issues/LETY diagnosis- had re-evaluation and refitting of her C-Pap mask recently  Elena:  No Symptoms  Psychosis: No Symptoms  Anxiety: No Symptoms  Panic:  No " "symptoms  Post Traumatic Stress Disorder: occasional flashbacks & nightmares from ladder accident in 2019 (less frequent at this point); waking up at times from the nightmare of being on the ground after falling 8 to 10 feet off the ladder. Patient reported that she \"did a lot of processing during rehabilitation\" and recovery around this, as well as \"a lot of work with Calm (paul) practice and relaxation strategies/meditation.\" Patient denied avoidance, negative alterations in cognition & mood, or hyperarousal sxs. Pt. denied flashbacks and nightmares impacting her functioning.   Eating Disorder: No Symptoms since retired. 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 Stated now being retired, \"I don't think about food as I did when I worked;\" having more planned and intentional eating habits.  ADD / ADHD:  No symptoms  Conduct Disorder: No symptoms  Autism Spectrum Disorder: No symptoms  Obsessive Compulsive Disorder: No Symptoms    Patient reports the following compulsive behaviors and treatment history: None.      Diagnostic Criteria:   Rule Out/ Trauma and Stressor Related Disorder. Will seek further information from MMPI-3.    Functional Status:  Patient reports the following functional impairments:health maintenance and self-care. Weight interfering with optimizing legs & joints recovery, as well as \"endurance and flexibility\" in the context of fitness .  Nonprogrammatic care:  Patient is requesting basic services to address current mental health concerns.    Clinical Summary:  1. Reason for assessment: Bariatric Pre-Surgical Psychological Evaluation.  2. Psychosocial, Cultural and Contextual Factors: serious accident in December of 2019 \"Broke both legs, long recovery. I have permanent limits to mobility. I am concerned about my weight and " "how it will affect me long term. I need to get this weight off my joints.\"    3. Principal DSM5 Diagnoses  (Sustained by DSM5 Criteria Listed Above):  None currently- seeking further information from MMPI-3  4. Other Diagnoses that is relevant to services:  Class 3 severe obesity with serious comorbidity and BMI of 44.0 to 44.9 in adult; Obstructive Sleep Apnea  5. Provisional Diagnosis: Rule Out/  309.9 (F43.9) Unspecified Trauma and Stressor Related Disorder (as evidenced by nightmares & flashbacks from falling off the ladder & breaking her legs in 2019)  6. Prognosis: Expect Improvement.  7. Likely consequences of symptoms if not treated: Symptoms likely to persist and may worsen if not treated.  8. Client strengths include:  caring, educated, empathetic, goal-focused, good listener, has a previous history of therapy, intelligent, motivated, open to learning, open to suggestions / feedback, support of family, friends and providers, wants to learn, willing to ask questions, willing to relate to others and work history .     Recommendations:     1. Plan for Safety and Risk Management:   Safety and Risk: Recommended that patient call 911 or go to the local ED should there be a change in any of these risk factors..          Report to child / adult protection services was NA.     2. Patient's identified no concerns relevant to the [facility].    3. Initial Treatment will focus on: Completing MMPI-3, and noticing/tracking cognitions related to boredom & food, as well as other emotion-food associations.     4. Resources/Service Plan:    services are not indicated.   Modifications to assist communication are not indicated.   Additional disability accommodations are not indicated.      5. Collaboration:   Collaboration / coordination of treatment will be initiated with the following support professionals: [facility].     6.  " Referrals: None     7. ORALIA:    ORALIA:  Discussed the general effects of drugs and alcohol on health and well-being.  Recommendations: Cut back on caffeine intake in preparing for surgery.     8. Records:   These were reviewed at time of assessment.   Information in this assessment was obtained from the medical record and provided by patient who is a good historian. Patient will have open access to their mental health medical record.    9.   Interactive Complexity: No      Provider Name/ Credentials:  Bee Butler PsyD, NAEL  [place] 23, 2023

## 2023-06-01 ENCOUNTER — PATIENT OUTREACH (OUTPATIENT)
Dept: CARE COORDINATION | Facility: CLINIC | Age: 58
End: 2023-06-01
Payer: COMMERCIAL

## 2023-06-08 ENCOUNTER — VIRTUAL VISIT (OUTPATIENT)
Dept: PSYCHOLOGY | Facility: CLINIC | Age: 58
End: 2023-06-08
Payer: COMMERCIAL

## 2023-06-08 ENCOUNTER — FCC EXTENDED DOCUMENTATION (OUTPATIENT)
Dept: PSYCHOLOGY | Facility: CLINIC | Age: 58
End: 2023-06-08
Payer: COMMERCIAL

## 2023-06-08 DIAGNOSIS — F43.22 ADJUSTMENT DISORDER WITH ANXIETY: ICD-10-CM

## 2023-06-08 DIAGNOSIS — F43.22 ADJUSTMENT DISORDER WITH ANXIETY: Primary | ICD-10-CM

## 2023-06-08 DIAGNOSIS — E66.01 CLASS 3 SEVERE OBESITY WITH SERIOUS COMORBIDITY AND BODY MASS INDEX (BMI) OF 40.0 TO 44.9 IN ADULT, UNSPECIFIED OBESITY TYPE (H): Primary | ICD-10-CM

## 2023-06-08 DIAGNOSIS — E66.01 CLASS 3 SEVERE OBESITY WITH SERIOUS COMORBIDITY AND BODY MASS INDEX (BMI) OF 40.0 TO 44.9 IN ADULT, UNSPECIFIED OBESITY TYPE (H): ICD-10-CM

## 2023-06-08 DIAGNOSIS — E66.813 CLASS 3 SEVERE OBESITY WITH SERIOUS COMORBIDITY AND BODY MASS INDEX (BMI) OF 40.0 TO 44.9 IN ADULT, UNSPECIFIED OBESITY TYPE (H): Primary | ICD-10-CM

## 2023-06-08 DIAGNOSIS — E66.813 CLASS 3 SEVERE OBESITY WITH SERIOUS COMORBIDITY AND BODY MASS INDEX (BMI) OF 40.0 TO 44.9 IN ADULT, UNSPECIFIED OBESITY TYPE (H): ICD-10-CM

## 2023-06-08 DIAGNOSIS — G47.33 OSA (OBSTRUCTIVE SLEEP APNEA): ICD-10-CM

## 2023-06-08 PROCEDURE — 90837 PSYTX W PT 60 MINUTES: CPT | Mod: VID | Performed by: PSYCHOLOGIST

## 2023-06-08 NOTE — PROGRESS NOTES
[facility]           Progress Note     Patient Name: Luz Maria Uribe               Date:   June 8, 2023                                           Service Type: Individual for Bariatric Pre-Surgical Psychological Evaluation                           Session Start Time:  12:02pm                   Session End Time: 1:22pm                Session Length:       80 min     Session #:      2     Attendees:      Patient     Service Modality:  Video Visit:      Provider verified identity through the following two step process.  Patient provided:  Patient is known previously to provider and Patient was verified at admission/transfer     Telemedicine Visit: The patient's condition can be safely assessed and treated via synchronous audio and visual telemedicine encounter.       Reason for Telemedicine Visit: Patient convenience (e.g. access to timely appointments / distance to available provider)     Originating Site (Patient Location): Patient's home     Distant Site (Provider Location): Provider Remote Setting- Home Office     Consent:  The patient/guardian has verbally consented to: the potential risks and benefits of telemedicine (video visit) versus in person care; bill my insurance or make self-payment for services provided; and responsibility for payment of non-covered services.      Patient would like the video invitation sent by:  My Chart     Mode of Communication:  Video Conference via Amwell     Distant Location (Provider):  Off-site     As the provider I attest to compliance with applicable laws and regulations related to telemedicine.        DATA  Interactive Complexity: No  Crisis: No                                     Progress Since Last Session (Related to Symptoms / Goals / Homework):              Symptoms: Improving     Homework: Achieved / completed to satisfaction (completed MMPI-3, and has been trying to  "notice more behaviors and emotions surrounding eating habits, per pt.'s report)                            Episode of Care Goals: Satisfactory progress - ACTION (Actively working towards change); Intervened by reinforcing change plan / affirming steps taken                 Current / Ongoing Stressors and Concerns:              Health Concerns/Mainly legs health and recovery following severe injury in 2019 from falling down the ladder-\"adjusting to physical limitations around legs injury and not being able to do as much as I want to do.\"                 Treatment Objective(s) Addressed in This Session:                      Patient was referred for an evaluation by the [facility].   Completed questions about weight management history and concerns & benefits of bariatric surgery.   Assessed and provided psychoeducation on mental health symptoms; explored factors contributing to current weight management challenges     Patient's Statement of Presenting Concern:  Patient is presenting for a psychological assessment as a routine part of the process for pursuing weight loss surgery.       History of Presenting Concern:   Patient was referred for an evaluation by the [facility]. Patient is presenting for a psychological evaluation as a routine part of the process for pursuing weight loss surgery. Patient reports they have attempted to lose weight in the past through Jennifer David, Weight Watchers, an exercise program, prescription weight loss medications, and watching portion sizes. Reported being currently on Wegovy which is working well for her in terms of weight loss medication, in addition to the healthy lifestyle changes she has made since November of 2022. Patient indicated having quit alcohol use (to decrease caloric intake), started cooking at home more, has been making healthy food choices (including more vegetables and proteins, " "while cutting out carbs), and increased physical activity with swimming. Patient reported that she \"was on the fence\" regarding pursuing bariatric surgery versus relying solely on lifestyle changes. She is considering gastric bypass surgery at this point.      Patient reported experiencing a traumatic injury to both of her legs upon falling off the ladder in 2019. She reportedly had 6 surgery for her legs since, and \"I have to get weight off of my legs\" in order to help her legs' recovery. The patient reports they believe the surgery will benefit them by \"improving my health and fitness in the long term\" through weight loss, and improving joint functioning and legs' health since the accident. Patient noted having an \"empowering experience with eating healthier and benefits I am already seeing\" with decreased cholesterol and A1C levels, since the healthy lifestyle changes she has made starting in November of 2022. Patient identified being able to walk half a mile since her recovery process (\"used to do 6 miles of hiking\" prior to her injury). Patient indicated currently swimming, and weight training- had a home gym installed in the house for her recovery.      Patient reported that the most she weighed was 250 lbs in November of 2022, and the lowest she weighed as an adult was 150 to 155 lbs in college. Reported that weight gain started late 20s early 30s, and went up progressively- \"the last 50 pounds was in the last 15 years.\" Patient retired recently and is adjusting to physical limitation and orthopedic surgical intervention recovery, with the last surgery being done this past November. Patient reported having lost weight since making lifestyle changes starting November 2022, and reached her weight loss goal. Patient current weight is \"223 lbs.\"     Patient described that work stress was a factor in weight gain prior to retiring. 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 Reported that the combination of stress, unhealthy food choices, and \"eating out a lot\" added to weight gain. Now she is retired, and no longer has to eat at \"random times\" as she used to when having to do surgery shifts.      Patient reports that other professional(s) are involved in providing support / services (including her PCP, and the [facility] team. She was involved with Orthopedics due to long recovery and multiple surgeries since her 2019 legs injury, but \"wrapped up\" this care recently).     Current status: Patient reported that weight impacts daily life in the following ways: affects sleep apnea, climbing stairs, was impacting exercising- however ability to exercise has improved since she has been training & has \"more strength\"-, and most importantly, weight is impacting legs and joints recovery.    Habits that are currently going well:  drinking more water \"80 ounces a day,\" portion control, cooking meals at home, eliminating sweets, no carbonated beverages and no alcohol for 6 months now, was eating 3 meals/day but feels better when eating twice a day and having a protein shake or something light for lunch; no beverages 30 minutes before/during/ after eating meals; and reducing caffeine intake.     Struggles include: \"the idea of eating for 30 minutes,\" it's hard to slow down during meals, especially when sitting down with people or with her wife who eats fast, as well as the idea of chewing food \"until it turns into mush;\" stress if having family around- tendency for wanting to have swedish fish, or gummie treats.     Dieting and exercise history: The Patient reported that she has had the most success with Wegovy, home cooked meals, portion control, increased physical activity, and increased water intake. Currently, she stated that she typically has a protein shake or a smoothie made at home (micro greens and " fruits) for breakfast; for lunch, she might have tuna, chicken, egg salad, boiled egg on some green, or low carb bread/tortilla, otherwise she might replace food with a protein shake as she feels better when she does not eat lunch; for dinner, she eats vegetables like asparagus, cauliflower, snap peas, or beats, a salad, and either salmon, chicken, or steak bites for protein; for snacks she might have nuts, power balls made at home (with nuts, oatmeal, peanut butter, hemp seeds, protein powder), cheese crackers, airpop popcorn occasionally, an apple, or an orange.      Patient indicated swimming and weight training- had a home gym installed in the house  for her recovery, and has been engaging in regular physical activity in the past few months. She tries  to stay as physically active as possible in her recovery from her legs injury, and is planning on doing  some yard work today (mulching).    Type of Surgery: Patient reported that she is interested in the gastric sleeve. Patient has talked with her team about her plans.     Risks of Surgery: Patient did report a clear understanding of the risks of surgery, which she has considered against the risks of not pursuing surgery. The patient reported that she understands the risks of surgery to be: not coming out of anesthesia, excessive bleeding, blood clots, infection, inability to tolerate certain foods after surgery, long term risks such as hernia, GERD, hair loss, and malnutrition. She expressed understanding of the need for ongoing lifestyle changes to eating and activity patterns, as well as the need for lifelong vitamin supplementation. She has some concerns and questions about long term risks, which she would like addressed in making her decision on whether or not to proceed with bariatric surgery. Noted that ambivalence about weight loss surgery increased since she has been experiencing success with weight loss by making healthy lifestyle changes, in addition  "to starting Wegovy which has been working well for her. Patient noted having been reading more literature about long term risks associated with this type of surgery. Discussed during session seeking further information & addressing long term risks concerns upon meeting with her surgeon, and in further discussion with her provider at the weight loss clinic while finalizing her decision.    Lifestyle Changes: Patient reported needing to work on slowing down when eating (making dinner last 20 to 30 min), chewing to applesauce consistency, and attending support groups. She reported that she is not concerned about meeting her goals regarding lifestyle changes. She is most concerned about \"not getting enough micronutrients and being metabolically deprived\" if she were to go through bariatric surgery.     Support Post-Surgery: The Patient identified her wife, sister, friends, and neighbor as available to help her after surgery. She stated that she has told her wife and siblings so far, who are aware and supportive. \"I didn't tell my friend who is a nurse, because she would talk me out of it.\" She has one sister who is supportive of her continuing with lifestyle changes & medication, but not encouraging her to pursue surgery.    Current stressors include: \"adjusting to physical limitations around my legs injury and not being able to do as much as I want to do.\" Coping skills include: redirecting with a distraction, \"taking time to myself\" through meditation- calm paul/body scan; getting out in nature; TikTok; reading; tv shows; aldo or repair things/projects around the house.    Current Goals with Nutritionist: Patient s current goals with her nutritionist are working on \"portions size, water intake, and chewing food;\" patient identified doing well with portions size & water intake, but has yet to work on chewing her food longer and slowing down when eating her meals. She is interested in behavioral support with these " goals. Problem solved on strategies to help with chewing food to applesauce consistency and making dinner last at least 20min (increase later to 30min).     Patient denied: binging, purging, or eating until uncomfortably full. She denied a history of restricting behaviors consistent with anorexia nervosa.        Intervention:  CBT: provided psychoeducation, cognitive restructuring, and perspective taking. Discussed coping skills in managing distress and adjusting to physical limitations. Reinforced and affirmed healthy behavior choices, and discussed realistic expectations of herself. Discussed breaking down bigger goals in smaller steps to increase success.   Motivational Interviewing: utilized OARS & EPE in the communication around ambivalence in moving forward with bariatric surgery; explored pros/cons; affirmed patient's autonomy in decision making. Encouraged pt. to gather further information in addressing questions & concerns she might still have when meeting with her surgeon in a couple of weeks   Solution Focused Therapy: asked present & future focused questions in exploring and problem solving on strategies to practice intentionality, as well as chewing food to applesauce consistency and slowing down when sitting down for dinner (including practicing mindfulness with counting 20 chews per bite, using a timer, and communicating with her wife about her goal with meal time.)       Assessments completed prior to visit:  The following assessments were completed by patient for this visit:  PHQ9:       6/14/2006     7:45 AM 2/21/2012     2:30 PM 9/25/2012     2:45 PM 5/23/2023    10:37 AM   PHQ-9 SCORE   PHQ-9 Total Score 11 0 1    PHQ-9 Total Score MyChart    1 (Minimal depression)   PHQ-9 Total Score    1     GAD7:       5/17/2023    11:45 AM   KORIN-7 SCORE   Total Score 0 (minimal anxiety)   Total Score 0                           ASSESSMENT: Current Emotional / Mental Status (status of significant symptoms):     "         Risk status (Self / Other harm or suicidal ideation)              Patient denies current fears or concerns for personal safety.              Patient denies current or recent suicidal ideation or behaviors.              Patient denies current or recent homicidal ideation or behaviors.              Patient denies current or recent self injurious behavior or ideation.              Patient denies other safety concerns.              Patient reports there has been no change in risk factors since their last session.                Patient reports there has been no change in protective factors since their last session.                Recommended that patient call 911 or go to the local ED should there be a change in any of these risk factors.                Appearance:                            Appropriate               Eye Contact:                           Good               Psychomotor Behavior:          Normal               Attitude:                                   Cooperative  Interested Pleasant              Orientation:                             All              Speech                       Rate / Production:       Normal/ Responsive                       Volume:                       Normal               Mood:                                Normal & \"concerned\" about long term implications of bariatric surgery              Affect:                                      Appropriate               Thought Content:                    Clear               Thought Form:            Coherent  Logical               Insight:                                    Good                 Medication Review:              No current psychiatric medications prescribed                Medication Compliance:              NA                Changes in Health Issues:              None reported                Chemical Use Review:              Substance Use: Chemical use reviewed, no active concerns identified                 Tobacco " Use: No current tobacco use.      Diagnosis:  Rule Out/ Trauma and Stressor Related Disorder in partial remission  (remaining occasional nightmares & flashbacks- though pt. denied experiencing significant distress from flashbacks related to her ladder accident, or nightmares & flashbacks impacting functioning at this point- rather noted working on adjusting to the physical limitations due to legs injury, which resulted from this accident)  309.24 (F43.22) Adjustment Disorder with Anxiety, in partial remission (provisional)  1. Class 3 severe obesity with serious comorbidity and body mass index (BMI) of 40.0 to 44.9 in adult, unspecified obesity type (H)    2. LETY (obstructive sleep apnea)        Collateral Reports Completed:              Not Applicable      Preliminary Treatment Plan:  Patient will return for follow-up session next month. She will continue with scheduled weight loss surgery clinic appointments. She was encouraged to attend a Weight Loss Surgery Support Group meeting.     Patient will work on the following homework assignment: practicing chewing food for about 20 times per bite to help with slowing down with meals (will practice making dinner last for at least 20 minutes), practice intentionality & mindfulness in her relationship with food (track observation utilizing identified tracking chart of cues/triggers, emotions, and cravings), and discuss at her meeting with her surgeon questions and further concerns she has around long term risks of gastric sleeve surgery, in deciding whether she would like to proceed with bariatric surgery or not.    A return appointment is scheduled for 06/29/23.  Plan to review progress and any challenges that may arise. Patient has my contact information and is aware that she may contact me in the meantime as needed.            Bee Butler PsyD, LP               June 8, 2023

## 2023-06-08 NOTE — PROGRESS NOTES
Patient completed the [place] Multiphasic Personality Inventory-3 (MMPI-3), a self-report personality inventory, as a routine part of the psychological assessment for pursuing weight loss surgery.       SYNOPSIS  Scores on the MMPI-3 Validity Scales raise concerns about the possible impact of under-reporting on the validity of this protocol. With that caution noted, there are no indications of somatic or cognitive complaints, or of emotional, thought, behavioral, or interpersonal dysfunction.    PROTOCOL VALIDITY    Content Non-Responsiveness  The test taker produced scorable responses to all the MMPI-3 items. She also responded relevantly to the items on the basis of their content.  Over-Reporting  There are no indications of over-reporting in this protocol.  Under-Reporting  The test taker presented herself as well-adjusted. If there is evidence that this individual is not well-adjusted, any absence of elevation on the Substantive Scales should be interpreted with caution. Elevated scores on the Substantive Scales may underestimate the problems assessed by those scales.    SUBSTANTIVE SCALE INTERPRETATION  The following interpretation needs to be considered in light of cautions noted about the possible impact of under-reporting (of psychological problems) on the validity of this protocol.    Somatic/Cognitive, Emotional, Thought, and Behavioral Dysfunction  There are no indications of somatic, cognitive, emotional, thought, or behavioral dysfunction in this protocol. However, because of indications of under-reporting described earlier, such problems cannot be ruled out.  Interpersonal Functioning Scales  These scales provide no evidence of dysfunction.    DIAGNOSTIC CONSIDERATIONS  No specific psychodiagnostic recommendations are indicated by this MMPI-3 protocol. However, this finding needs to be considered in light of cautions noted earlier about possible under-reporting.    TREATMENT CONSIDERATIONS  No  specific recommendations for treatment are indicated by this MMPI-3 protocol. However, this finding needs to be considered in light of cautions noted earlier about possible under-reporting.    Patient endorsed the following depressive symptoms on the PHQ-9 (a brief self-report measure of depression symptoms).: disrupted sleep (due to sleep apnea issues/LETY reportedly). Her score of 1 suggests no depressive symptoms, especially with items 1 and 2 being negative for little interest/pleasure in doing things and feeling down/depressed.  Patient did not endorse any items on the KORIN-7 (a brief self-report measure of anxiety symptoms). Her score of 0 suggests no anxiety symptoms.    Assessment of eating disorder symptoms indicates that patient has not met criteria for bulimia nervosa, binge eating disorder, or anorexia nervosa.      Bee Butler PsyD, LP                                                      June 8, 2023

## 2023-06-20 ENCOUNTER — PATIENT OUTREACH (OUTPATIENT)
Dept: CARE COORDINATION | Facility: CLINIC | Age: 58
End: 2023-06-20
Payer: COMMERCIAL

## 2023-06-29 ENCOUNTER — PATIENT OUTREACH (OUTPATIENT)
Dept: CARE COORDINATION | Facility: CLINIC | Age: 58
End: 2023-06-29
Payer: COMMERCIAL

## 2023-06-29 ENCOUNTER — VIRTUAL VISIT (OUTPATIENT)
Dept: PSYCHOLOGY | Facility: CLINIC | Age: 58
End: 2023-06-29
Payer: COMMERCIAL

## 2023-06-29 DIAGNOSIS — E66.01 CLASS 3 SEVERE OBESITY WITH SERIOUS COMORBIDITY AND BODY MASS INDEX (BMI) OF 40.0 TO 44.9 IN ADULT, UNSPECIFIED OBESITY TYPE (H): ICD-10-CM

## 2023-06-29 DIAGNOSIS — G47.33 OSA (OBSTRUCTIVE SLEEP APNEA): ICD-10-CM

## 2023-06-29 DIAGNOSIS — E66.813 CLASS 3 SEVERE OBESITY WITH SERIOUS COMORBIDITY AND BODY MASS INDEX (BMI) OF 40.0 TO 44.9 IN ADULT, UNSPECIFIED OBESITY TYPE (H): ICD-10-CM

## 2023-06-29 DIAGNOSIS — F43.22 ADJUSTMENT DISORDER WITH ANXIETY: Primary | ICD-10-CM

## 2023-06-29 PROCEDURE — 90837 PSYTX W PT 60 MINUTES: CPT | Mod: VID | Performed by: PSYCHOLOGIST

## 2023-06-29 PROCEDURE — 96131 PSYCL TST EVAL PHYS/QHP EA: CPT | Mod: VID | Performed by: PSYCHOLOGIST

## 2023-06-29 PROCEDURE — 96130 PSYCL TST EVAL PHYS/QHP 1ST: CPT | Mod: VID | Performed by: PSYCHOLOGIST

## 2023-06-29 NOTE — PROGRESS NOTES
[facility]               Progress Note     Patient Name: Thelma Uribe Date: June 29, 2023         Service Type: Individual for Bariatric Pre-Surgical Psychological Evaluation       Session Start Time: 12:05pm  Session End Time: 1:12pm      Session Length: 67min     Session #: 3     Attendees: Patient      Service Modality:  Video Visit:      Provider verified identity through the following two step process.  Patient provided:  Patient is known previously to provider and Patient was verified at admission/transfer    Telemedicine Visit: The patient's condition can be safely assessed and treated via synchronous audio and visual telemedicine encounter.      Reason for Telemedicine Visit: Patient convenience (e.g. access to timely appointments / distance to available provider)    Originating Site (Patient Location): Patient's cabin    Distant Site (Provider Location): Provider Remote Setting- Home Office    Consent:  The patient/guardian has verbally consented to: the potential risks and benefits of telemedicine (video visit) versus in person care; bill my insurance or make self-payment for services provided; and responsibility for payment of non-covered services.     Patient would like the video invitation sent by:  My Chart    Mode of Communication:  [facility]    Distant Location (Provider):  Off-site    As the provider I attest to compliance with applicable laws and regulations related to telemedicine.        DATA  Extended Session (53+ minutes): Yes- Patient's presenting concerns require more intensive intervention than could be completed within the usual service   Interactive Complexity: No  Crisis: No        Progress Since Last Session (Related to Symptoms / Goals / Homework):   Symptoms: Improved    Homework: Achieved to Satisfaction. Pt. reported successfully working on chewing food to applesauce consistency (counting 18 to 20 chews per bite), making dinner last at  "least 20 minutes, and increased awareness of cues and triggers to craving treats (e.g. Swedish fish), while using alternative strategies to manage and cope with this. Explained that she had to re-schedule her meet & greet visit with her bariatric surgeon last week, so she did not get yet to address the questions & concerns she has regarding long term risks associated with bariatric sleeve surgery, and plans on doing so at her scheduled appointment with Dr. Leyva on 07/20.      Current / Ongoing Stressors and Concerns:   Health Concerns/Mainly legs health and recovery following severe injury in 2019 from falling down the ladder-\"adjusting to physical limitations around legs injury and not being able to do as much as I want to do.\"   Some family stress (wife's aging parents)     Treatment Objective(s) Addressed in This Session:   Complete psychological assessment for bariatric surgery  Review MMPI-3 results  Provide psychoeducation on risks & what to expect post-bariatric surgery     Intervention:   Completed one-month follow up for pre-bariatric psychological assessment. Reviewed patient's progress with homework over the past month.    Patient reported having been \"using a timer\" during dinner, to practice slowing down when eating meals and making dinner last at least 20 minutes. Indicated having discussed this with her wife and both realizing the level of intentionality and practice that goes into it- as it was challenging initially, but patient has been able to practice this successfully. Patient reported continuing to work on aiming to make meals last between 20 to 30 minutes, and chewing her food longer. Noted that some types of food like fish disintegrate faster, but other types of food she is able to make last 18 to 20 chews per bite. Reflected on increased intentionality and mindfulness with eating habits, choices, and portion control. Patient shared an example of additional changes she has been making; for " "instance when out for lunch with a friend at a restaurant, she asked for half of her salad to be placed in a to go box in working on portion control, and making the portion she had in front of her last longer. Stated working on \"smaller bites, smaller portions, and chewing longer.\" She noted continued success with not having water 30 minutes before, during, and after meals, by placing her water bottle away, and \"no more glasses at the table\" even when eating with friends. Upon inquiry, patient identified having been utilizing the eMarketer paul to track and understand patterns of food choices and emotional associations- she is feeling that her awareness has increased around triggers and cues to certain eating behaviors and cravings. Processed how family related stress at times can be a trigger, and identified during session coping skills she has been utilizing and that she will utilize in the future in managing stress, anxiety, or frustration related to this.    Since she had to re-schedule her appointment with Dr. Leyva because of a schedule conflict, patient is planning on asking her questions when meeting with Dr. Leyva next month. She is hoping to address further concerns and questions she has read about in the literature on long term risks associated with bariatric surgery. 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 She will be able to finalize her decision when she has \"more information around long term consequences, especially regarding vitamins absorption.\"    Discussed changes that may occur in relationships following weight loss surgery and how to manage these changes. Talked specifically about potential reactions from loved ones who are having their own struggles with weight loss or body " "acceptance. Emphasized that patient's job is to stay focused on her health and well-being and making decisions that are good for them; she can allow others to struggle with their own reactions without feeling compelled to make changes in order to make them more comfortable. Patient denied having individuals in her environment or family whose issues would be triggered by her weight loss journey. Patient stated that she has already been practicing utilizing assertive communication with friends and family around the healthy eating habits she has been practicing. Patient stated, \"I am changing my behavior with friends\" and communicating that she is on a health journey. Patient reported that \"it was a good experience to own it, without having to reveal all the details\" which she decided that she might not want to share. Noted that family and friends are \"very supportive.\" Patient reported that \"if I choose to do the surgery, I will communicate this to very few people,\" and is already aware of \"how much and what I want to share.\" Reflected on how she would respond and engage in social gatherings with friends and family in sticking to her new lifestyle goals. Discussed and identified responses she might use if offered food, alcohol, or asked questions, when needing to observe her limits and be assertive (e.g. \"I am working with a dietician;\" \"Thank you, I can't have another bite\").      Reviewed the results of the MMPI-3 evaluation. MMPI-3 protocol raised some concern about the possible impact of under-reporting. Discussed this with patient. Patient presented herself as well-adjusted with no indications of somatic or cognitive complaints, or of emotional, thought, behavioral, or interpersonal dysfunction. Upon direct interview, patient reported that nightmares & flashbacks related to her falling off the ladder have been decreasing in frequency (last one that she could recall was a month ago), and that she is continuing to " work on adjusting to physical limitations due to her legs injury.     Encouraged discussion of questions she has around vitamins/supplements recommended to her before and after surgery, and any modification if needed to medication she currently takes post-surgery with her prescribers and weight loss clinic team. Patient indicated having already discussed the list of medications she is currently taking and going through this review with a pharmacist.     Reviewed risks of SI and addiction post surgery, and the importance of coping skills use and outreach on the emergence of concerning behaviors.     Discussed the value and benefits of attending a weight loss support group, in learning from peers who have been through the process, hearing other pre-surgical patient s questions and guest speakers, learning recipes, hearing about strategies to successfully make it through holiday meals, and increase her chances of success and adherence/accountability with behavioral change. Patient reported that she is considering attending the weight loss support group offered through [employer] and will discuss this further with her nutritionist or provider at the weight loss clinic.           Intervention Mode:      CBT & CC: provided psychoeducation, cognitive restructuring (through healthy reframes on emotions & cognitive distortions associated with eating behaviors), and perspective taking. Encouraged the use of self-affirmations, and identified positive reminders she will utilize in making healthy behavior choices. Discussed coping skills in managing distress and worry thoughts in dealing with family stressors (including decision-tree/containing the worries strategy), and reinforced the use of guided relaxation and deep breathing through her Calm Florentin in grounding. Reinforced and affirmed healthy behavior choices patient reported on (in working on portion control, healthy choices, slowing down with meals, chewing food to applesauce  consistency,  beverages from meals, and being assertive in her communication with friends and family about the heathy lifestyle changes she is making.)        DBT informed: provided psychoeducation on the use of TIPP skills with cold/ice pack in managing overwhelm & increased stress when needed, self sooth, Reality Acceptance skill, as well as mindfulness practice.  Solution Focused Therapy: asked present & future focused questions in exploring and problem solving on strategies to continue to work on achieving her personal goals prior to surgery- including continue to maintain portion control, continuing to practice chewing to apple sauce consistency (aim to chew 15 to 20 times), using a timer for dinner/meals to make it last 20 to 30 minutes, as well as strategies that she has been utilizing in not having water for 30min before/during/after meals. Identified questions & concerns she will address at her meeting with her bariatric surgeon next month, in gathering all the information she needs in feeling more confident in her choice (regarding bariatric surgery).       ASSESSMENT: Current Emotional / Mental Status (status of significant symptoms):   Risk status (Self / Other harm or suicidal ideation)   Patient denies current fears or concerns for personal safety.   Patient denies current or recent suicidal ideation or behaviors.   Patient denies current or recent homicidal ideation or behaviors.   Patient denies current or recent self injurious behavior or ideation.   Patient denies other safety concerns.   Patient reports there has been no change in risk factors since their last session.     Patient reports there has been no change in protective factors since their last session.     Recommended that patient call 911 or go to the local ED should there be a change in any of these risk factors.     Appearance:   Appropriate    Eye Contact:   Good    Psychomotor Behavior: Normal    Attitude:   Cooperative   "Pleasant   Orientation:   All   Speech    Rate / Production: Normal     Volume:  Normal    Mood:    Normal Euthymic; \"good\"   Affect:    Appropriate Smiling context appropriate   Thought Content:  Clear    Thought Form:  Coherent  Goal Directed  Logical    Insight:    Good      Medication Review:   No changes to current psychiatric medication(s)     Medication Compliance:   NA     Changes in Health Issues:   None reported     Chemical Use Review:   Substance Use: Chemical use reviewed, no active concerns identified      Tobacco Use: No current tobacco use.      Diagnosis:  1. Adjustment disorder with anxiety    2. Class 3 severe obesity with serious comorbidity and body mass index (BMI) of 40.0 to 44.9 in adult, unspecified obesity type (H)    3. LETY (obstructive sleep apnea)         Collateral Reports Completed:   Routed note to Care Team Member(s) Routed to patient's [facility] team.     Summary and Final Recommendation:  Based on the MMPI-3 results and direct interview with patient, the diagnosis of 309.24 (F43.22) Adjustment Disorder with Anxiety, in partial remission, seems appropriate. Patient is continuing to adjust to the physical limitations due to her legs injury, which resulted from her ladder accident in 2019. Flashbacks and other residual symptoms of anxiety related to this event, seem minimal and manageable at this point.    From a psychological standpoint, Patient is cleared to continue in the process for pursuing bariatric surgery.     To summarize the 3 primary conditions being evaluated in the psychological assessment:     1. Patient is prepared to comply with ongoing aftercare and lifestyle  changes after surgery--condition satisfied     Patient has made some meaningful initial changes to her eating, activity, and habits. She reported an understanding of the need for ongoing changes to these lifestyle habits. She expressed their willingness to maintain changes " "to eating and activity habits after surgery.    Patient had lost over 27 pounds and reached her weight loss goal upon initial visit with writer in May of 2023. She started her healthy lifestyle changes in November of 2022 when she weighed 250 lbs reportedly, and weighed \"223 lbs\" by May 2023.  She reported successfully working on focusing on protein and vegetables intake, portion control, chewing food to applesauce consistency, making dinner last at least 20 minutes,  beverages from meals, and increasing her awareness of cues and triggers to craving treats, while utilizing alternative strategies to manage and cope with this. She has reportedly eliminated alcohol, carbonated beverages, and sweets from her diet, and has felt empowered with all the changes she has made.     2. 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 Patient reported that symptoms began in 2005 when adjusting to work, and resolved in 2006 - she tried the prescribed medication at the time for 2 months but this didn't help, so she stopped taking Citalopram, and eventually symptoms resolved later that year.    Patient sustained a severe injury to her legs in 2019 upon falling off the ladder that made her bed ridden for a few months. Her recovery was long and hard, but she is doing rather well at this point in recovering from this significant event. Flashbacks as well as waking up from nightmares related to this fall have decreased over time, and are infrequent at this point. Patient reported that she \"did a lot of processing during rehabilitation\" and recovery around this, as well as \"a lot of work with Calm (paul) practice " "and relaxation strategies/meditation.\" Patient's symptoms appear to be minimal and well managed at this time. She seems emotionally stable and able to engage her family and health care system appropriately when needed. She was encouraged to reach out to her PCP/health care team to get support if symptoms increase, or if she feels the need for counseling services/psychotherapy.      3. Patient is cognitively capable of understanding the risks of the procedure--condition satisfied     Patient has been able to demonstrate that she understands the risks of surgery compared to the risks of not having surgery.     With respect to remaining risk factors, patient chooses to continue to pursue this process and meet with her surgeon Dr. Leyva on July 20th, to gather further information and address concerns she has regarding long term consequences of bariatric sleeve surgery.      PLAN: (Client Tasks / Therapist Tasks / Other)    Patient has completed psychological assessment required for bariatric surgery.  Complete report will be forwarded to the weight loss surgery clinic for review.      Recommend standard post-surgical follow up, with sessions 1 and 3 months postsurgery, to assess client's functioning and adjustment post-surgical lifestyle.      Patient was encouraged to attend a weight loss surgery support group, starting before surgery and continuing afterwards, for additional accountability and support.    Patient was provided with writer's contact information and is aware that she may contact writer sooner as needed.      Bee Butler PsyD, LP            6/29/2023      Psychological Testing   Billing/Services Summary       Testing Evaluation Services Base: 72153  (1st 60 mins) Add-on: 39229  (each addtl 60 mins)   Record Review   05/23/2023 8:50AM-8:57AM (7) 7 minutes   Integration/Report Generation   06/08/2023 2:04PM-2:27PM MMPI (23)   Integrated Report   06/29/2023 1:15PM-1:45PM (30) & 07/03/2023 " 8:07AM-10:01AM (114)  Total (144)  167 minutes       Post-Service Work   07/03/2023 10:02AM - 10:25AM  (23)  23 minutes   Total Time:  197 minutes ( 3 hours, 17 minutes)   Total Units: 1 2           Diagnosis(es): 309.24 (F43.22) Adjustment Disorder with Anxiety, in partial remission

## 2023-07-03 ENCOUNTER — FCC EXTENDED DOCUMENTATION (OUTPATIENT)
Dept: PSYCHOLOGY | Facility: CLINIC | Age: 58
End: 2023-07-03
Payer: COMMERCIAL

## 2023-07-03 DIAGNOSIS — E66.813 CLASS 3 SEVERE OBESITY WITH SERIOUS COMORBIDITY AND BODY MASS INDEX (BMI) OF 40.0 TO 44.9 IN ADULT, UNSPECIFIED OBESITY TYPE (H): ICD-10-CM

## 2023-07-03 DIAGNOSIS — F43.22 ADJUSTMENT DISORDER WITH ANXIETY: Primary | ICD-10-CM

## 2023-07-03 DIAGNOSIS — G47.33 OSA (OBSTRUCTIVE SLEEP APNEA): ICD-10-CM

## 2023-07-03 DIAGNOSIS — E66.01 CLASS 3 SEVERE OBESITY WITH SERIOUS COMORBIDITY AND BODY MASS INDEX (BMI) OF 40.0 TO 44.9 IN ADULT, UNSPECIFIED OBESITY TYPE (H): ICD-10-CM

## 2023-07-03 NOTE — PROGRESS NOTES
[facility]        PATIENT'S NAME:    Thelma Uribe  PREFERRED NAME: Luz Maria  PRONOUNS: She/her  MRN:   [id number]  :   1965  ADDRESS: [address] [place] [id number]  ACCT. NUMBER:  [id number]  DATE OF SERVICE:  23  START TIME: 11:02am  END TIME: 12:25pm  PREFERRED PHONE: [phone]  May we leave a program related message: Yes  SERVICE MODALITY:  Video Visit:      Provider verified identity through the following two step process.  Patient provided:  Patient photo and Patient      Telemedicine Visit: The patient's condition can be safely assessed and treated via synchronous audio and visual telemedicine encounter.       Reason for Telemedicine Visit: Services only offered telehealth     Originating Site (Patient Location): Patient's home     Distant Site (Provider Location): Provider Remote Setting- Home Office     Consent:  The patient/guardian has verbally consented to: the potential risks and benefits of telemedicine (video visit) versus in person care; bill my insurance or make self-payment for services provided; and responsibility for payment of non-covered services.      Patient would like the video invitation sent by:  My Chart     Mode of Communication:  Video Conference via [facility]     Distant Location (Provider):  Off-site     As the provider I attest to compliance with applicable laws and regulations related to telemedicine.     Encouraged patient to contact their insurance provider to learn more about their personal cost for the assessment process.      Reviewed the limits of confidentiality.     Reviewed [facility] Attendance Agreement. Patient expressed understanding that if patient no shows or cancels with less than 24 hours for an appointment, twice within 6 months, then the patient will not be allowed to schedule for six months.      UNIVERSAL ADULT Mental Health DIAGNOSTIC ASSESSMENT     Identifying Information:  Patient  "is a 57 year old,  individual.  Patient was referred for an assessment by [facility]. Patient attended the session alone.     Chief Complaint:     Patient was referred for an evaluation by the [facility]. Patient is presenting for a psychological evaluation as a routine part of the process for pursuing weight loss surgery. Patient reports they have attempted to lose weight in the past through Jennifer David, Weight Watchers, an exercise program, prescription weight loss medications, and watching portion sizes. Reported being currently on Wegovy which is working well for her in terms of weight loss medication, in addition to the healthy lifestyle changes she has made since November of 2022. Patient indicated having quit alcohol use (to decrease caloric intake), started cooking at home more, has been making healthy food choices (including more vegetables and proteins, while cutting out carbs), and increased physical activity with swimming. Patient reported that she \"was on the fence\" regarding pursuing bariatric surgery versus relying solely on lifestyle changes. She is considering gastric bypass surgery at this point.      Patient reported experiencing a traumatic injury to both of her legs upon falling off the ladder in 2019. She reportedly had 6 surgery for her legs since, and \"I have to get weight off of my legs\" in order to help her legs' recovery. The patient reports they believe the surgery will benefit them by \"improving my health and fitness in the long term\" through weight loss, and improving joint functioning and legs' health since the accident. Patient noted having an \"empowering experience with eating healthier and benefits I am already seeing\" with decreased cholesterol and A1C levels, since the healthy lifestyle changes she has made starting in November of 2022. Patient identified being able to walk half a " "mile since her recovery process (\"used to do 6 miles of hiking\" prior to her injury). Patient indicated currently swimming, and weight training- had a home gym installed in the house for her recovery.      Patient reported that the most she weighed was 250 lbs in November of 2022, and the lowest she weighed as an adult was 150 to 155 lbs in college. Reported that weight gain started late 20s early 30s, and went up progressively- \"the last 50 pounds was in the last 15 years.\" Patient retired recently and is adjusting to physical limitation and orthopedic surgical intervention recovery, with the last surgery being done this past November. Patient reported having lost weight since making lifestyle changes starting November 2022, and reached her weight loss goal. Patient current weight is \"223 lbs.\"     Patient described that work stress was a factor in weight gain prior to retiring. 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 Reported that the combination of stress, unhealthy food choices, and \"eating out a lot\" added to weight gain. Now she is retired, and no longer has to eat at \"random times\" as she used to when having to do surgery shifts.       Patient reports that other professional(s) are involved in providing support / services (including her PCP, and the [facility] team. She was involved with Orthopedics due to long recovery and multiple surgeries since her 2019 legs injury, but \"wrapped up\" this care recently).      Current status: Patient reported that weight impacts daily life in the following ways: affects sleep apnea, climbing stairs, was impacting exercising- however ability to exercise has improved since she has been training & has \"more strength\"-, and most importantly, weight is impacting legs and joints recovery.     Habits that are currently going well:  drinking more water " "\"80 ounces a day,\" portion control, cooking meals at home, eliminating sweets, no carbonated beverages and no alcohol for 6 months now, was eating 3 meals/day but feels better when eating twice a day and having a protein shake or something light for lunch; no beverages 30 minutes before/during/ after eating meals; and reducing caffeine intake.      Struggles include: \"the idea of eating for 30 minutes,\" it's hard to slow down during meals, especially when sitting down with people or with her wife who eats fast, as well as the idea of chewing food \"until it turns into mush;\" stress if having family around- tendency for wanting to have swedish fish, or gummie treats.      Dieting and exercise history: The Patient reported that she has had the most success with Wegovy, home cooked meals, portion control, increased physical activity, and increased water intake. Currently, she stated that she typically has a protein shake or a smoothie made at home (micro greens and fruits) for breakfast; for lunch, she might have tuna, chicken, egg salad, boiled egg on some green, or low carb bread/tortilla, otherwise she might replace food with a protein shake as she feels better when she does not eat lunch; for dinner, she eats vegetables like asparagus, cauliflower, snap peas, or beats, a salad, and either salmon, chicken, or steak bites for protein; for snacks she might have nuts, power balls made at home (with nuts, oatmeal, peanut butter, hemp seeds, protein powder), cheese crackers, airpop popcorn occasionally, an apple, or an orange.                  Patient indicated swimming and weight training- had a home gym installed in the house             for her recovery, and has been engaging in regular physical activity in the past few months. She tries    to stay as physically active as possible in her recovery from her legs injury, and is planning on doing          some yard work today (mulching).     Type of Surgery: Patient " "reported that she is interested in the gastric sleeve. Patient has talked with her team about her plans.      Risks of Surgery: Patient did report a clear understanding of the risks of surgery, which she has considered against the risks of not pursuing surgery. The patient reported that she understands the risks of surgery to be: not coming out of anesthesia, excessive bleeding, blood clots, infection, inability to tolerate certain foods after surgery, long term risks such as hernia, GERD, hair loss, and malnutrition. She expressed understanding of the need for ongoing lifestyle changes to eating and activity patterns, as well as the need for lifelong vitamin supplementation. She has some concerns and questions about long term risks, which she would like addressed in making her decision on whether or not to proceed with bariatric surgery. Noted that ambivalence about weight loss surgery increased since she has been experiencing success with weight loss by making healthy lifestyle changes, in addition to starting Wegovy which has been working well for her. Patient noted having been reading more literature about long term risks associated with this type of surgery. Discussed during session seeking further information & addressing long term risks concerns upon meeting with her surgeon, and in further discussion with her provider at the weight loss clinic while finalizing her decision.     Lifestyle Changes: Patient reported needing to work on slowing down when eating (making dinner last 20 to 30 min), chewing to applesauce consistency, and attending support groups. She reported that she is not concerned about meeting her goals regarding lifestyle changes. She is most concerned about \"not getting enough micronutrients and being metabolically deprived\" if she were to go through bariatric surgery.     Support Post-Surgery: The Patient identified her wife, sister, friends, and neighbor as available to help her after " "surgery. She stated that she has told her wife and siblings so far, who are aware and supportive. \"I didn't tell my friend who is a nurse, because she would talk me out of it.\" She has one sister who is supportive of her continuing with lifestyle changes & medication, but not encouraging her to pursue surgery.     Current stressors include: \"adjusting to physical limitations around my legs injury and not being able to do as much as I want to do.\" Coping skills include: redirecting with a distraction, \"taking time to myself\" through meditation- calm paul/body scan; getting out in nature; TikTok; reading; tv shows; aldo or repair things/projects around the house.     Current Goals with Nutritionist: Patient s current goals with her nutritionist are working on \"portions size, water intake, and chewing food;\" patient identified doing well with portions size & water intake, but has yet to work on chewing her food longer and slowing down when eating her meals. She is interested in behavioral support with these goals. Problem solved on strategies to help with chewing food to applesauce consistency and making dinner last at least 20min (increase later to 30min).     Patient denied: binging, purging, or eating until uncomfortably full. She denied a history of restricting behaviors consistent with anorexia nervosa.        Social/Family History:  Patient reported they grew up in [facility].  They were raised by biological parents  .  Parents were always together.  Patient reported that their childhood was \"[place] town, big family, [employer].\" Patient described their current relationships with family of origin as \"very good\" with siblings; Parents are .      The patient describes their cultural background as .  Cultural influences and impact on patient's life structure, values, norms, and healthcare: [place] and being a lesbian.  Contextual influences on patient's health include: " "none. These factors will be addressed in the Preliminary Treatment plan. Patient identified their preferred language to be English. Patient reported they do not need the assistance of an  or other support involved in therapy.      Patient reported had no significant delays in developmental tasks.  Patient's highest education level was graduate school  - physical therapy, then physician assistant for surgery.  Patient identified the following learning problems: none reported.  Modifications will not be used to assist communication in therapy.  Patient reports they are able to understand written materials.     Patient reported the following relationship history: \"7 years and 6 years dating relationships\" prior to meeting spouse.  Patient's current relationship status is  for 4 years and has a \"great relationship\" with her spouse who is \"very supportive.\"  Patient identified their sexual orientation as arvizu.  Patient reported having no child(nahomi). Patient identified siblings; pets; friends; spouse as part of their support system.  Patient identified the quality of these relationships as stable and meaningful.      Patient's current living/housing situation involves staying in own home.  The immediate members of family and household include Mayda, 51,Wife and they report that housing is stable.     Patient is currently retired.  Patient reports their finances are obtained through employment; spouse. Patient does not identify finances as a current stressor.       Patient reported that they have not been involved with the legal system. Patient does not report being under probation/ parole/ jurisdiction. They are not under any current court jurisdiction. .     Patient's Strengths and Limitations:  Patient identified the following strengths or resources that will help them succeed in treatment: commitment to health and well being, friends / good social support, family support, neighbor support, " "intelligence, mindfulness practice, motivation, and perseverance. Things that may interfere with the patient's success in treatment include: \"once I make the decision, I would do well with it,\" but she is still contemplating bariatric surgery risks and \"a little of stigma\" around this surgery.      Assessments:  The following assessments were completed by patient for this visit:  PHQ9:        6/14/2006     7:45 AM 2/21/2012     2:30 PM 9/25/2012     2:45 PM   PHQ-9 SCORE   PHQ-9 Total Score 11 0 1      GAD7:        5/17/2023    11:45 AM   KORIN-7 SCORE   Total Score 0 (minimal anxiety)   Total Score 0      CAGE-AID:        5/17/2023    12:04 PM   CAGE-AID Total Score   Total Score 1   Total Score MyChart 1 (A total score of 2 or greater is considered clinically significant)      PROMIS 10-Global Health (all questions and answers displayed):        5/17/2023    12:03 PM   PROMIS 10   In general, would you say your health is: Very good   In general, would you say your quality of life is: Very good   In general, how would you rate your physical health? Very good   In general, how would you rate your mental health, including your mood and your ability to think? Very good   In general, how would you rate your satisfaction with your social activities and relationships? Very good   In general, please rate how well you carry out your usual social activities and roles Very good   To what extent are you able to carry out your everyday physical activities such as walking, climbing stairs, carrying groceries, or moving a chair? Mostly   In the past 7 days, how often have you been bothered by emotional problems such as feeling anxious, depressed, or irritable? Never   In the past 7 days, how would you rate your fatigue on average? Mild   In the past 7 days, how would you rate your pain on average, where 0 means no pain, and 10 means worst imaginable pain? 4   In general, would you say your health is: 4   In general, would you say " your quality of life is: 4   In general, how would you rate your physical health? 4   In general, how would you rate your mental health, including your mood and your ability to think? 4   In general, how would you rate your satisfaction with your social activities and relationships? 4   In general, please rate how well you carry out your usual social activities and roles. (This includes activities at home, at work and in your community, and responsibilities as a parent, child, spouse, employee, friend, etc.) 4   To what extent are you able to carry out your everyday physical activities such as walking, climbing stairs, carrying groceries, or moving a chair? 4   In the past 7 days, how often have you been bothered by emotional problems such as feeling anxious, depressed, or irritable? 1   In the past 7 days, how would you rate your fatigue on average? 2   In the past 7 days, how would you rate your pain on average, where 0 means no pain, and 10 means worst imaginable pain? 4   Global Mental Health Score 17   Global Physical Health Score 15   PROMIS TOTAL - SUBSCORES 32      [place] Suicide Severity Rating Scale (Lifetime/Recent)         View : No data to display.                   Personal and Family Medical History:  Patient does not report a family history of mental health concerns.  Patient reports family history includes Arthritis in her mother; Blood Disease in her father and sister; C.A.D. (age of onset: 65) in her father; Cancer (age of onset: 56) in her sister; Cancer (age of onset: 89) in her mother; Coronary Artery Disease in her brother, father, and mother; Coronary Artery Disease (age of onset: 54) in her brother; Diabetes in her maternal grandmother and paternal grandfather; Hyperlipidemia in her father; Hypertension in her father and mother; Kidney Cancer (age of onset: 49) in her brother; Lipids in her father and mother; Osteoporosis in her mother; Other Cancer in her brother, brother, mother, and  "sister; Prostate Cancer in her father; Thyroid Disease in her father, mother, sister, and sister..      Patient does report Mental Health Diagnosis and/or Treatment.  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  Patient reported symptoms began in 2005 when adjusting to work and resolved in 2006 or so. 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 Psychiatric Hospitalizations: None.  Patient denies a history of civil commitment.  Currently, patient is not receiving mental health services.         Patient has had a physical exam to rule out medical causes for current symptoms.  Date of last physical exam was within the past year. Patient was encouraged to follow up with PCP if symptoms were to develop. The patient has a [place] Primary Care Provider, who is named Tiana Macdonald.  Patient reports no current medical concerns: \"I got an evaluation recently (for LETY); I got a new mask for C-PAP,\" and went though recent labs.  Patient reports no pain concerns; explained that \"there is some level of pain every day\" since her legs' injury, but she played golf yesterday & did it with a motorized cart- had to walk some which lead to soreness; patient noted that she still does what she enjoys and does not let the injury or pain stop her.  Patient does not want help addressing pain concerns. There are not significant appetite / nutritional concerns / weight changes.   Patient does not report a history of head injury / trauma / cognitive impairment.       Patient reports current meds as:        Outpatient Medications Marked as Taking for the " "5/23/23 encounter (Virtual Visit) with [facility]   Medication Sig     Multiple Vitamin (THERAPEUTIC MULTIVITAMIN PO)       naproxen sodium (ANAPROX) 220 MG tablet Take 220 mg by mouth 2 times daily as needed for moderate pain     Semaglutide-Weight Management (WEGOVY) 1 MG/0.5ML pen Inject 1 mg Subcutaneous once a week     Semaglutide-Weight Management (WEGOVY) 1.7 MG/0.75ML pen Inject 1.7 mg Subcutaneous once a week     SYNTHROID 112 MCG tablet Take 1 tablet (112 mcg) by mouth daily         Medication Adherence:  Patient reports taking.  taking prescribed medications as prescribed.     Patient Allergies:         Allergies   Allergen Reactions     Hydrocodone-Acetaminophen Hives, Itching and Nausea and Vomiting     Oxycodone Hives, Itching, Nausea and Vomiting and Rash         Medical History:    Past Medical History        Past Medical History:   Diagnosis Date     Dysthymic disorder       much improved, took celexa x 1mo in '06     Elevated blood pressure reading without diagnosis of hypertension       in '10, much improved with diet/exercise     Exercise induced bronchospasm       takes albuterol very rarely     Female infertility of other specified origin       stopped trying     GERD (gastroesophageal reflux disease)       prilosec for awhile, stable off for yrs (2/16)     History of blood transfusion 12/19     hospitalization, 3 units     Thyroid disease                 Current Mental Status Exam:   Appearance:                Appropriate    Eye Contact:               Good   Psychomotor:              Normal       Gait / station:           Unable to assess due to video visit/sitting up in a chair  Attitude / Demeanor:   Cooperative  Interested Pleasant  Speech      Rate / Production:   Normal/ Responsive      Volume:                   Normal  volume      Language:               intact  Mood:                          Normal \"Good\"  Affect:                          Appropriate    Thought Content:        " Clear   Thought Process:        Coherent       Associations:           No loosening of associations  Insight:                         Good   Judgment:                   Intact   Orientation:                 All  Attention/concentration:          Good        Substance Use:  Patient did report a family history of substance use concerns; see medical history section for details- brother has a history of alcohol use disorder, but has been sober for some time.  Patient has not received chemical dependency treatment in the past.  Patient has not ever been to detox.       Patient is not currently receiving any chemical dependency treatment.               Substance History of use Age of first use Date of last use       Pattern and duration of use (include amounts and frequency)   Alcohol used in the past    11 10/29/22 REPORTS SUBSTANCE USE: N/A   Pt. reported previously using alcohol 2 to 3 times a week socially, and having two drinks at a time. Pt. decided to stop alcohol use in November of 2022 for weight loss reason, as well as part of her health & wellness goal.   Cannabis    never used   REPORTS SUBSTANCE USE: N/A      Amphetamines    never used   REPORTS SUBSTANCE USE: N/A   Cocaine/crack     never used       REPORTS SUBSTANCE USE: N/A   Hallucinogens never used         REPORTS SUBSTANCE USE: N/A   Inhalants never used         REPORTS SUBSTANCE USE: N/A   Heroin never used         REPORTS SUBSTANCE USE: N/A   Other Opiates never used   REPORTS SUBSTANCE USE: N/A   Benzodiazepine    never used   REPORTS SUBSTANCE USE: N/A   Barbiturates never used   REPORTS SUBSTANCE USE: N/A   Over the counter meds never used   REPORTS SUBSTANCE USE: N/A   Caffeine currently use 17  REPORTS SUBSTANCE USE: reports using substance 1 times per day  and has 1 Italian Espresso at a time.   Patient reports heaviest use was between 49 and 54. Patient reportedly cut back on caffeine use since her legs injury in 2019.   Nicotine  never used    "REPORTS SUBSTANCE USE: N/A   Other substances not listed above:  Identify:  never used   REPORTS SUBSTANCE USE: N/A      Patient reported the following problems as a result of their substance use: no problems, not applicable.     Substance Use: No symptoms     Based on the negative CAGE score and clinical interview there  are not indications of drug or alcohol abuse.        Significant Losses / Trauma / Abuse / Neglect Issues:   Patient did not serve in the .  There are indications or report of significant loss, trauma, abuse or neglect issues related to: death of father in 2004 was hard at the time & having the responsibility of her mother who passed away a year and half ago; ladder injury in 2019 was the most impacting - \"I almost lost my leg, and worked hard to come back from those limitations, and in accepting current limits\" which she is still adjusting to.   Concerns for possible neglect are not present.      Safety Assessment:   Patient denies current homicidal ideation and behaviors.  Patient denies current self-injurious ideation and behaviors.    Patient denied risk behaviors associated with substance use.  Patient denies any high risk behaviors associated with mental health symptoms.  Patient reports the following current concerns for their personal safety: None.  Patient reports there are not firearms in the house.      History of Safety Concerns:  Patient denied a history of homicidal ideation.     Patient denied a history of personal safety concerns.    Patient denied a history of assaultive behaviors.    Patient denied a history of sexual assault behaviors.     Patient denied a history of risk behaviors associated with substance use.  Patient denies any history of high risk behaviors associated with mental health symptoms.  Patient reports the following protective factors: forward or future oriented thinking; dedication to family or friends; safe and stable environment; commitment to well " "being; positive social skills; financial stability; sense of personal control or determination     Risk Plan:  See Recommendations for Safety and Risk Management Plan     Review of Symptoms per patient report:   Depression:     Sleep issues/LETY diagnosis- had re-evaluation and refitting of her C-Pap mask recently  Elena:             No Symptoms  Psychosis:       No Symptoms  Anxiety:           No Symptoms  Panic:              No symptoms  Post Traumatic Stress Disorder: occasional flashbacks & nightmares from ladder accident in 2019 (less frequent at this point); waking up at times from the nightmare of being on the ground after falling 8 to 10 feet off the ladder. Patient reported that she \"did a lot of processing during rehabilitation\" and recovery around this, as well as \"a lot of work with Calm (paul) practice and relaxation strategies/meditation.\" Patient denied avoidance, negative alterations in cognition & mood, or hyperarousal sxs. Pt. denied flashbacks and nightmares impacting her functioning.   Eating Disorder:          No Symptoms since retired. 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 Stated now being retired, \"I don't think about food as I did when I worked;\" having more planned and intentional eating habits.  ADD / ADHD:              No symptoms  Conduct Disorder:       No symptoms  Autism Spectrum Disorder:     No symptoms  Obsessive Compulsive Disorder:       No Symptoms     Patient reports the following compulsive behaviors and treatment history: None.       Diagnostic Criteria:   Rule Out/ Trauma and Stressor Related Disorder. Will seek further information from MMPI-3.     Functional Status:  Patient reports the following functional impairments:health maintenance and self-care. Weight interfering with optimizing legs & joints " "recovery, as well as \"endurance and flexibility\" in the context of fitness .  Nonprogrammatic care:  Patient is requesting basic services to address current mental health concerns.     Clinical Summary:  1. Reason for assessment: Bariatric Pre-Surgical Psychological Evaluation.  2. Psychosocial, Cultural and Contextual Factors: serious accident in December of 2019 \"Broke both legs, long recovery. I have permanent limits to mobility. I am concerned about my weight and how it will affect me long term. I need to get this weight off my joints.\"    3. Principal DSM5 Diagnoses  (Sustained by DSM5 Criteria Listed Above):  None currently- seeking further information from MMPI-3  4. Other Diagnoses that is relevant to services:  Class 3 severe obesity with serious comorbidity and BMI of 44.0 to 44.9 in adult; Obstructive Sleep Apnea  5. Provisional Diagnosis: Rule Out/  309.9 (F43.9) Unspecified Trauma and Stressor Related Disorder (as evidenced by nightmares & flashbacks from falling off the ladder & breaking her legs in 2019)  6. Prognosis: Expect Improvement.  7. Likely consequences of symptoms if not treated: Symptoms likely to persist and may worsen if not treated.  8. Client strengths include:  caring, educated, empathetic, goal-focused, good listener, has a previous history of therapy, intelligent, motivated, open to learning, open to suggestions / feedback, support of family, friends and providers, wants to learn, willing to ask questions, willing to relate to others and work history .      Recommendations:      1. Plan for Safety and Risk Management:              Safety and Risk: Recommended that patient call 911 or go to the local ED should there be a change in any of these risk factors..                                                                      Report to child / adult protection services was NA.      2. Patient's identified no concerns relevant to the [facility] Comprehensive Weight Management " Program.     3. Initial Treatment will focus on: Completing MMPI-3, and noticing/tracking cognitions related to boredom & food, as well as other emotion-food associations.                4. Resources/Service Plan:               services are not indicated.              Modifications to assist communication are not indicated.              Additional disability accommodations are not indicated.                 5. Collaboration:              Collaboration / coordination of treatment will be initiated with the following support professionals: [facility] Comprehensive Weight Management Team.     6.  Referrals: None                7. ORALIA:               ORALIA:  Discussed the general effects of drugs and alcohol on health and well-being.  Recommendations: Cut back on caffeine intake in preparing for surgery.      8. Records:              These were reviewed at time of assessment.              Information in this assessment was obtained from the medical record and provided by patient who is a good historian. Patient will have open access to their mental health medical record.     9.   Interactive Complexity: No        Psychological Testing:  Patient completed the [place] Multiphasic Personality Inventory-3 (MMPI-3), a self-report personality inventory, as a routine part of the psychological assessment for pursuing weight loss surgery.     SYNOPSIS  Scores on the MMPI-3 Validity Scales raise concerns about the possible impact of under-reporting on the validity of this protocol. With that caution noted, there are no indications of somatic or cognitive complaints, or of emotional, thought, behavioral, or interpersonal dysfunction.     PROTOCOL VALIDITY     Content Non-Responsiveness  The test taker produced scorable responses to all the MMPI-3 items. She also responded relevantly to the items on the basis of their content.  Over-Reporting  There are no indications of over-reporting in this  protocol.  Under-Reporting  The test taker presented herself as well-adjusted. If there is evidence that this individual is not well-adjusted, any absence of elevation on the Substantive Scales should be interpreted with caution. Elevated scores on the Substantive Scales may underestimate the problems assessed by those scales.     SUBSTANTIVE SCALE INTERPRETATION  The following interpretation needs to be considered in light of cautions noted about the possible impact of under-reporting (of psychological problems) on the validity of this protocol.     Somatic/Cognitive, Emotional, Thought, and Behavioral Dysfunction  There are no indications of somatic, cognitive, emotional, thought, or behavioral dysfunction in this protocol. However, because of indications of under-reporting described earlier, such problems cannot be ruled out.  Interpersonal Functioning Scales  These scales provide no evidence of dysfunction.     DIAGNOSTIC CONSIDERATIONS  No specific psychodiagnostic recommendations are indicated by this MMPI-3 protocol. However, this finding needs to be considered in light of cautions noted earlier about possible under-reporting.     TREATMENT CONSIDERATIONS  No specific recommendations for treatment are indicated by this MMPI-3 protocol. However, this finding needs to be considered in light of cautions noted earlier about possible under-reporting.     Patient endorsed the following depressive symptoms on the PHQ-9 (a brief self-report measure of depression symptoms).: disrupted sleep (due to sleep apnea issues/LETY reportedly). Her score of 1 suggests no depressive symptoms, especially with items 1 and 2 being negative for little interest/pleasure in doing things and feeling down/depressed.  Patient did not endorse any items on the KORIN-7 (a brief self-report measure of anxiety symptoms). Her score of 0 suggests no anxiety symptoms.     Assessment of eating disorder symptoms indicates that patient has not met  "criteria for bulimia nervosa, binge eating disorder, or anorexia nervosa.      Follow up session 06/29/2023  Completed one-month follow up for pre-bariatric psychological assessment. Reviewed patient's progress with homework over the past month.      Patient reported having been \"using a timer\" during dinner, to practice slowing down when eating meals and making dinner last at least 20 minutes. Indicated having discussed this with her wife and both realizing the level of intentionality and practice that goes into it, as it was challenging initially, but patient has been able to practice this successfully. Patient reported continuing to work on aiming to make meals last between 20 to 30 minutes, and chewing her food longer. Noted that some types of food like fish disintegrate faster, but other types of food she is able to make last 18 to 20 chews per bite. Reflected on increased intentionality and mindfulness with eating habits, choices, and portion control. Patient shared an example of additional changes she has been making; for instance when out for lunch with a friend at a restaurant, she asked for half of her salad to be placed in a to go box in working on portion control, and making the portion she had in front of her last longer. Stated working on \"smaller bites, smaller portions, and chewing longer.\" She noted continued success with not having water 30 minutes before, during, and after meals, by placing her water bottle away, and \"no more glasses at the table\" even when eating with friends. Upon inquiry, patient identified having been utilizing the [employer] paul to track and understand patterns of food choices and emotional associations- she is feeling that her awareness has increased around triggers and cues to certain eating behaviors and cravings. Processed how family related stress at times can be a trigger, and identified during session coping skills she has been utilizing and that she will utilize in the " "future in managing stress, anxiety, or frustration related to this.    Since she had to re-schedule her appointment with Dr. Leyva because of a schedule conflict, patient is planning on asking her questions when meeting with Dr. Leyva next month. She is hoping to address further concerns and questions she has read about in the literature on long term risks associated with bariatric surgery. 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 She will be able to finalize her decision when she has \"more information around long term consequences, especially regarding vitamins absorption.\"    Discussed changes that may occur in relationships following weight loss surgery and how to manage these changes. Talked specifically about potential reactions from loved ones who are having their own struggles with weight loss or body acceptance. Emphasized that patient's job is to stay focused on her health and well-being and making decisions that are good for them; she can allow others to struggle with their own reactions without feeling compelled to make changes in order to make them more comfortable. Patient denied having individuals in her environment or family whose issues would be triggered by her weight loss journey. Patient stated that she has already been practicing utilizing assertive communication with friends and family around the healthy eating habits she has been practicing. Patient stated, \"I am changing my behavior with friends\" and communicating that she is on a health journey. Patient reported that \"it was a good experience to own it, without having to reveal all the details\" which she decided that she might not want to share. Noted that family and friends are \"very supportive.\" Patient reported that " "\"if I choose to do the surgery, I will communicate this to very few people,\" and is already aware of \"how much and what I want to share.\" Reflected on how she would respond and engage in social gatherings with friends and family in sticking to her new lifestyle goals. Discussed and identified responses she might use if offered food, alcohol, or asked questions, when needing to observe her limits and be assertive (e.g. \"I am working with a dietician;\" \"Thank you, I can't have another bite\").      Reviewed the results of the MMPI-3 evaluation. MMPI-3 protocol raised some concern about the possible impact of under-reporting. Discussed this with patient. Patient presented herself as well-adjusted, with no indications of somatic or cognitive complaints, or of emotional, thought, behavioral, or interpersonal dysfunction. Upon direct interview, patient reported that nightmares & flashbacks related to her falling off the ladder have been decreasing in frequency (last one that she could recall was a month ago), and that she is continuing to work on adjusting to physical limitations due to her legs injury.    Encouraged discussion of questions she has around vitamins/supplements recommended to her before and after surgery, and any modification if needed to medication she currently takes post-surgery with her prescribers and weight loss clinic team. Patient indicated having already discussed the list of medications she is currently taking and going through this review with a pharmacist.     Reviewed risks of SI and addiction post surgery, and the importance of coping skills use and outreach on the emergence of concerning behaviors.     Discussed the value and benefits of attending a weight loss support group, in learning from peers who have been through the process, hearing other pre-surgical patient s questions and guest speakers, learning recipes, hearing about strategies to successfully make it through holiday meals, and " increase her chances of success and adherence/accountability with behavioral change. Patient reported that she is considering attending the weight loss support group offered through [employer] and will discuss this further with her nutritionist or provider at the weight loss clinic.           Intervention Mode:      CBT & CC: provided psychoeducation, cognitive restructuring (through healthy reframes on emotions & cognitive distortions associated with eating behaviors), and perspective taking. Encouraged the use of self-affirmations, and identified positive reminders she will utilize in making healthy behavior choices. Discussed coping skills in managing distress and worry thoughts in dealing with family stressors (including decision-tree/containing the worries strategy), and reinforced the use of guided relaxation and deep breathing through her Calm Florent[employer] in [employer]. Reinforced and affirmed healthy behavior choices patient reported on (in working on portion control, healthy choices, slowing down with meals, chewing food to applesauce consistency,  beverages from meals, and being assertive in her communication with friends and family about the heathy lifestyle changes she is making.)        DBT informed: provided psychoeducation on the use of TIPP skills with cold/ice pack in managing overwhelm & increased stress when needed, self sooth, Reality Acceptance skill, as well as mindfulness practice.  Solution Focused Therapy: asked present & future focused questions in exploring and problem solving on strategies to continue to work on achieving her personal goals prior to surgery- including continue to maintain portion control, continuing to practice chewing to apple sauce consistency (aim to chew 15 to 20 times), using a timer for dinner/meals to make it last 20 to 30 minutes, as well as strategies that she has been utilizing in not having water for 30min before/during/after meals. Identified questions &  "concerns she will address at her meeting with her bariatric surgeon next month, in gathering all the information she needs in feeling more confident in her choice (regarding bariatric surgery).       ASSESSMENT: Current Emotional / Mental Status (status of significant symptoms):   Risk status (Self / Other harm or suicidal ideation)   Patient denies current fears or concerns for personal safety.   Patient denies current or recent suicidal ideation or behaviors.   Patient denies current or recent homicidal ideation or behaviors.   Patient denies current or recent self injurious behavior or ideation.   Patient denies other safety concerns.   Patient reports there has been no change in risk factors since their last session.     Patient reports there has been no change in protective factors since their last session.     Recommended that patient call 911 or go to the local ED should there be a change in any of these risk factors.     Appearance:   Appropriate    Eye Contact:   Good    Psychomotor Behavior: Normal    Attitude:   Cooperative  Pleasant   Orientation:   All   Speech    Rate / Production: Normal     Volume:  Normal    Mood:    Normal Euthymic; \"good\"   Affect:    Appropriate Smiling context appropriate   Thought Content:  Clear    Thought Form:  Coherent  Goal Directed  Logical    Insight:    Good      Medication Review:   No changes to current psychiatric medication(s)     Medication Compliance:   NA     Changes in Health Issues:   None reported     Chemical Use Review:   Substance Use: Chemical use reviewed, no active concerns identified      Tobacco Use: No current tobacco use.      Diagnosis:  1. Adjustment disorder with anxiety    2. Class 3 severe obesity with serious comorbidity and body mass index (BMI) of 40.0 to 44.9 in adult, unspecified obesity type (H)    3. LETY (obstructive sleep apnea)         Collateral Reports Completed:   Routed note to Care Team Member(s) Routed to patient's [facility] " "Comprehensive Weight Management Program team.     Summary and Final Recommendation:  Based on the MMPI-3 results and direct interview with patient, the diagnosis of 309.24 (F43.22) Adjustment Disorder with Anxiety, in partial remission, seems appropriate. Patient is continuing to adjust to the physical limitations due to her legs injury, which resulted from her ladder accident in 2019. Flashbacks and other residual symptoms of anxiety related to this event, seem minimal and manageable at this point.    From a psychological standpoint, Patient is cleared to continue in the process for pursuing bariatric surgery.     To summarize the 3 primary conditions being evaluated in the psychological assessment:     1. Patient is prepared to comply with ongoing aftercare and lifestyle  changes after surgery--condition satisfied     Patient has made some meaningful initial changes to her eating, activity, and habits. She reported an understanding of the need for ongoing changes to these lifestyle habits. She expressed their willingness to maintain changes to eating and activity habits after surgery.    Patient had lost over 27 pounds and reached her weight loss goal upon initial visit with writer in May of 2023. She started her healthy lifestyle changes in November of 2022 when she weighed 250 lbs reportedly, and weighed \"223 lbs\" by May 2023. She reported successfully working on focusing on protein and vegetables intake, portion control, chewing food to applesauce consistency, making dinner last at least 20 minutes,  beverages from meals, and increasing her awareness of cues and triggers to craving treats, while utilizing alternative strategies to manage and cope with this. She has reportedly eliminated alcohol, carbonated beverages, and sweets from her diet, and has felt empowered with all the changes she has made.     2. 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 Patient reported that symptoms began in 2005 when adjusting to work, and resolved in 2006 - she tried the prescribed medication at the time for 2 months but this didn't help, so she stopped taking Citalopram, and eventually symptoms resolved later that year.    Patient sustained a severe injury to her legs in 2019 upon falling off the ladder that made her bed ridden for a few months. Her recovery was long and hard, but she is doing rather well at this point in recovering from this significant event. Flashbacks as well as waking up from nightmares related to this fall have decreased over time, and are infrequent at this point. Patient reported that she \"did a lot of processing during rehabilitation\" and recovery around this, as well as \"a lot of work with Calm (paul) practice and relaxation strategies/meditation.\" Patient's symptoms appear to be minimal and well managed at this time. She seems emotionally stable and able to engage her family and health care system appropriately when needed. She was encouraged to reach out to her PCP/health care team to get support if symptoms increase, or if she feels the need for counseling services/psychotherapy.      3. Patient is cognitively capable of understanding the risks of the procedure--condition satisfied     Patient has been able to demonstrate that she understands the risks of surgery compared to the risks of not having surgery.     With respect to remaining risk factors, patient chooses to continue to pursue this process and meet with her surgeon Dr. Leyva on July 20th, to gather further information and address concerns she has regarding long term consequences of bariatric sleeve surgery.      PLAN: " (Client Tasks / Therapist Tasks / Other)    Patient has completed psychological assessment required for bariatric surgery.  Complete report will be forwarded to the weight loss surgery clinic for review.      Recommend standard post-surgical follow up, with sessions 1 and 3 months postsurgery, to assess client's functioning and adjustment post-surgical lifestyle.      Patient was encouraged to attend a weight loss surgery support group, starting before surgery and continuing afterwards, for additional accountability and support.    Patient was provided with writer's contact information and is aware that she may contact writer sooner as needed.      Bee Butler PsyD, LP            6/29/2023           Psychological Testing   Billing/Services Summary       Testing Evaluation Services Base: 54572  (1st 60 mins) Add-on: 53925  (each addtl 60 mins)   Record Review   05/23/2023 8:50AM-8:57AM (7) 7 minutes   Integration/Report Generation   06/08/2023 2:04PM-2:27PM MMPI (23)   Integrated Report   06/29/2023 1:15PM-1:45PM (30) & 07/03/2023 8:07AM-10:01AM (114)  Total (144)  167 minutes       Post-Service Work   07/03/2023 10:02AM - 10:25AM  (23)  23 minutes   Total Time:  197 minutes ( 3 hours, 17 minutes)   Total Units: 1 2           Diagnosis(es): 309.24 (F43.22) Adjustment Disorder with Anxiety, in partial remission

## 2023-07-04 ENCOUNTER — PATIENT OUTREACH (OUTPATIENT)
Dept: CARE COORDINATION | Facility: CLINIC | Age: 58
End: 2023-07-04
Payer: COMMERCIAL

## 2023-07-10 ENCOUNTER — ALLIED HEALTH/NURSE VISIT (OUTPATIENT)
Dept: FAMILY MEDICINE | Facility: CLINIC | Age: 58
End: 2023-07-10
Payer: COMMERCIAL

## 2023-07-10 VITALS — WEIGHT: 220.13 LBS | BODY MASS INDEX: 37.78 KG/M2

## 2023-07-10 DIAGNOSIS — Z02.1 ENCOUNTER FOR PRE-EMPLOYMENT EXAMINATION: Primary | ICD-10-CM

## 2023-07-10 PROCEDURE — 99207 PR NO CHARGE NURSE ONLY: CPT

## 2023-07-20 ENCOUNTER — PATIENT OUTREACH (OUTPATIENT)
Dept: ENDOCRINOLOGY | Facility: CLINIC | Age: 58
End: 2023-07-20

## 2023-07-20 ENCOUNTER — OFFICE VISIT (OUTPATIENT)
Dept: ENDOCRINOLOGY | Facility: CLINIC | Age: 58
End: 2023-07-20
Payer: COMMERCIAL

## 2023-07-20 VITALS
BODY MASS INDEX: 37.69 KG/M2 | WEIGHT: 220.8 LBS | HEIGHT: 64 IN | OXYGEN SATURATION: 97 % | SYSTOLIC BLOOD PRESSURE: 132 MMHG | HEART RATE: 70 BPM | DIASTOLIC BLOOD PRESSURE: 84 MMHG

## 2023-07-20 DIAGNOSIS — E66.01 MORBID OBESITY (H): Primary | ICD-10-CM

## 2023-07-20 PROCEDURE — 99214 OFFICE O/P EST MOD 30 MIN: CPT | Performed by: SURGERY

## 2023-07-20 ASSESSMENT — PAIN SCALES - GENERAL: PAINLEVEL: NO PAIN (0)

## 2023-07-20 NOTE — NURSING NOTE
"(   Chief Complaint   Patient presents with     New Patient     Bariatric meet and greet    )    ( Weight: 100.2 kg (220 lb 12.8 oz) )  ( Height: 162.6 cm (5' 4\") )  ( BMI (Calculated): 37.9 )  (   )  (   )  (   )  (   )  (   )  (   )    ( BP: 132/84 )  (   )  (   )  (   )  ( Pulse: 70 )  (   )  ( SpO2: 97 % )    (   Patient Active Problem List   Diagnosis     CARDIOVASCULAR SCREENING; LDL GOAL LESS THAN 160     Thyroid nodule     GERD (gastroesophageal reflux disease)     Family history of ischemic heart disease     Class 3 severe obesity with serious comorbidity and body mass index (BMI) of 40.0 to 44.9 in adult (H)     Hyperlipidemia LDL goal <130     Postsurgical hypothyroidism    )  (   Current Outpatient Medications   Medication Sig Dispense Refill     Multiple Vitamin (THERAPEUTIC MULTIVITAMIN PO)        naproxen sodium (ANAPROX) 220 MG tablet Take 220 mg by mouth 2 times daily as needed for moderate pain       Semaglutide-Weight Management (WEGOVY) 2.4 MG/0.75ML pen Inject 2.4 mg Subcutaneous every 7 days 3 mL 3     SYNTHROID 112 MCG tablet Take 1 tablet (112 mcg) by mouth daily 90 tablet 3     Semaglutide-Weight Management (WEGOVY) 1 MG/0.5ML pen Inject 1 mg Subcutaneous once a week (Patient not taking: Reported on 7/20/2023) 2 mL 3     Semaglutide-Weight Management (WEGOVY) 1.7 MG/0.75ML pen Inject 1.7 mg Subcutaneous once a week (Patient not taking: Reported on 7/20/2023) 3 mL 0    )  ( Diabetes Eval:    )    ( Pain Eval:  No Pain (0) )    ( Wound Eval:       )    (   History   Smoking Status     Never   Smokeless Tobacco     Never    )    ( Signed By:  Jose Miranda, EMT; July 20, 2023; 10:18 AM )    "

## 2023-07-20 NOTE — PROGRESS NOTES
"Dear Dr. Macdonald,       I was asked to see the patient regarding obesity by the referring provider above.    I had the pleasure of meeting with your patient Thelma Uribe in our weight loss surgery office.  This patient is a 57 year old female who has been undergoing our thorough preoperative screening process in anticipation of potential bariatric surgery.    She has worked extensively in the past with Dr. Graham and more recently with Charis.    At initial evaluation we recorded Thelma Uribe's Height: 162.6 cm (5' 4\"), weight 238 lbs, and BMI 41.0 kg/m2.  The patient has been unsuccessful with other methods of permanent weight loss and suffers from multiple weight related medical conditions.  Due to lack of success in achieving weight loss through other methods, she is interested in undergoing bariatric surgery.    Currently on highest dose wegovy, which has been effective.    We talked about various strategies and decision points with respect to surgery or not and this was in the context of discussing risk of surgery as well.      Wt Readings from Last 6 Encounters:   07/20/23 100.2 kg (220 lb 12.8 oz)   07/10/23 99.8 kg (220 lb 2 oz)   05/02/23 104.5 kg (230 lb 6.4 oz)   04/27/23 105.6 kg (232 lb 14.4 oz)   04/12/23 104.8 kg (231 lb)   03/09/23 106.6 kg (235 lb)       PREVIOUS WEIGHT LOSS ATTEMPTS:      2/6/2023    10:24 AM   --   I have tried the following methods to lose weight Watching portions or calories    Exercise    Weight Watchers    Pre packaged meals ex: Nutrisystem    Slimfast    Prescription Medications    Physician directed program       CO-MORBIDITIES OF OBESITY INCLUDE:      2/6/2023    10:24 AM   --   I have the following health issues associated with obesity High Cholesterol    Sleep Apnea    Polycystic Ovarian Syndrome    Infertility    Fatty Liver    Osteoarthritis (joint disease)    Hypothyroidism       VITALS:  /84 (BP Location: Left arm, Patient Position: Sitting, " "Cuff Size: Adult Large)   Pulse 70   Ht 1.626 m (5' 4\")   Wt 100.2 kg (220 lb 12.8 oz)   LMP 09/10/2012 (Exact Date)   SpO2 97%   BMI 37.90 kg/m      PE:  GENERAL: Alert and oriented x3. NAD  HEENT exam: Sclerae not icteric. Hearing good. Head normocephalic and atraumatic.   CARDIOVASCULAR: No JVD  RESPIRATORY: Breathing unlabored  GASTROINTESTINAL: Obese  LOWER EXTREMITIES: no deformities  MUSCULOSKELETAL: Normal gait, Moves all 4 extremities equal and strong  NEUROLOGIC: no gross defect  SKIN: warm and dry to touch     In summary, she has undergone an appropriate medical evaluation, dietitian evaluation, as well as psychologic screening. The patient appears to be an appropriate candidate for bariatric surgery.    In the office today, I discussed the laparoscopic gastric sleeve surgery.  Risks, benefits and anticipated outcomes were outlined including the risk of death, staple line leak (1-2%), PE, DVT, ulcer, worsening GERD, N/V, stricture, hernia, wound infection, weight regain, and vitamin deficiencies. This patient has a good chance of sustaining permanent weight loss due to this procedure.  This should also allow improvement if not resolution of his/her weight related medical conditions.    At present we are going to present your patient's file for prior authorization to insurance.  Pending prior authorization, I anticipate a surgical date in the near future.  We will keep you updated on any progress.  If you have questions regarding care please feel free to contact me.  Total time spent in the clinic was 30 minutes with greater than 50% in face-to-face consultation.        Sincerely,    Christiano Leyva MD    Please route or send letter to:  Primary Care Provider (PCP) and Referring Provider  "

## 2023-07-20 NOTE — LETTER
"7/20/2023       RE: Thelma Uribe  [address]  [facility] [address]     Dear Colleague,    Thank you for referring your patient, Thelma Uribe, to the [facility] [place] at . Please see a copy of my visit note below.    Dear Dr. Macdonald,       I was asked to see the patient regarding obesity by the referring provider above.    I had the pleasure of meeting with your patient Thelma Uribe in our weight loss surgery office.  This patient is a 57 year old female who has been undergoing our thorough preoperative screening process in anticipation of potential bariatric surgery.    She has worked extensively in the past with Dr. Graham and more recently with Charis.    At initial evaluation we recorded Thelma Uribe's Height: 162.6 cm (5' 4\"), weight 238 lbs, and BMI 41.0 kg/m2.  The patient has been unsuccessful with other methods of permanent weight loss and suffers from multiple weight related medical conditions.  Due to lack of success in achieving weight loss through other methods, she is interested in undergoing bariatric surgery.    Currently on highest dose wegovy, which has been effective.    We talked about various strategies and decision points with respect to surgery or not and this was in the context of discussing risk of surgery as well.      Wt Readings from Last 6 Encounters:   07/20/23 100.2 kg (220 lb 12.8 oz)   07/10/23 99.8 kg (220 lb 2 oz)   05/02/23 104.5 kg (230 lb 6.4 oz)   04/27/23 105.6 kg (232 lb 14.4 oz)   04/12/23 104.8 kg (231 lb)   03/09/23 106.6 kg (235 lb)       PREVIOUS WEIGHT LOSS ATTEMPTS:      2/6/2023    10:24 AM   --   I have tried the following methods to lose weight Watching portions or calories    Exercise    Weight Watchers    Pre packaged meals ex: Nutrisystem    Slimfast    Prescription Medications    Physician directed program       CO-MORBIDITIES " "OF OBESITY INCLUDE:      2/6/2023    10:24 AM   --   I have the following health issues associated with obesity High Cholesterol    Sleep Apnea    Polycystic Ovarian Syndrome    Infertility    Fatty Liver    Osteoarthritis (joint disease)    Hypothyroidism       VITALS:  /84 (BP Location: Left arm, Patient Position: Sitting, Cuff Size: Adult Large)   Pulse 70   Ht 1.626 m (5' 4\")   Wt 100.2 kg (220 lb 12.8 oz)   LMP 09/10/2012 (Exact Date)   SpO2 97%   BMI 37.90 kg/m      PE:  GENERAL: Alert and oriented x3. NAD  HEENT exam: Sclerae not icteric. Hearing good. Head normocephalic and atraumatic.   CARDIOVASCULAR: No JVD  RESPIRATORY: Breathing unlabored  GASTROINTESTINAL: Obese  LOWER EXTREMITIES: no deformities  MUSCULOSKELETAL: Normal gait, Moves all 4 extremities equal and strong  NEUROLOGIC: no gross defect  SKIN: warm and dry to touch     In summary, she has undergone an appropriate medical evaluation, dietitian evaluation, as well as psychologic screening. The patient appears to be an appropriate candidate for bariatric surgery.    In the office today, I discussed the laparoscopic gastric sleeve surgery.  Risks, benefits and anticipated outcomes were outlined including the risk of death, staple line leak (1-2%), PE, DVT, ulcer, worsening GERD, N/V, stricture, hernia, wound infection, weight regain, and vitamin deficiencies. This patient has a good chance of sustaining permanent weight loss due to this procedure.  This should also allow improvement if not resolution of his/her weight related medical conditions.    At present we are going to present your patient's file for prior authorization to insurance.  Pending prior authorization, I anticipate a surgical date in the near future.  We will keep you updated on any progress.  If you have questions regarding care please feel free to contact me.  Total time spent in the clinic was 30 minutes with greater than 50% in face-to-face " consultation.        Sincerely,    Christiano Leyva MD    Please route or send letter to:  Primary Care Provider (PCP) and Referring Provider

## 2023-07-25 ENCOUNTER — VIRTUAL VISIT (OUTPATIENT)
Dept: ENDOCRINOLOGY | Facility: CLINIC | Age: 58
End: 2023-07-25
Payer: COMMERCIAL

## 2023-07-25 VITALS — WEIGHT: 220 LBS | BODY MASS INDEX: 37.56 KG/M2 | HEIGHT: 64 IN

## 2023-07-25 DIAGNOSIS — Z71.3 NUTRITIONAL COUNSELING: Primary | ICD-10-CM

## 2023-07-25 DIAGNOSIS — E66.9 OBESITY: ICD-10-CM

## 2023-07-25 PROCEDURE — 97803 MED NUTRITION INDIV SUBSEQ: CPT | Mod: VID | Performed by: DIETITIAN, REGISTERED

## 2023-07-25 PROCEDURE — 99207 PR NO CHARGE LOS: CPT | Mod: VID | Performed by: DIETITIAN, REGISTERED

## 2023-07-25 ASSESSMENT — PAIN SCALES - GENERAL: PAINLEVEL: NO PAIN (0)

## 2023-07-25 NOTE — LETTER
"7/25/2023       RE: Thelma Uribe  [address]  [facility] [address]     Dear Colleague,    Thank you for referring your patient, Thelma Uribe, to the [facility] [place] at [facility]. Please see a copy of my visit note below.    Thelma Uribe is a 57 year old female who is being evaluated via a billable video visit.      The patient has been notified of following:     \"This video visit will be conducted via a call between you and your physician/provider. We have found that certain health care needs can be provided without the need for an in-person physical exam.  This service lets us provide the care you need with a video conversation.  If a prescription is necessary we can send it directly to your pharmacy.  If lab work is needed we can place an order for that and you can then stop by our lab to have the test done at a later time.    Video visits are billed at different rates depending on your insurance coverage.  Please reach out to your insurance provider with any questions.    If during the course of the call the physician/provider feels a video visit is not appropriate, you will not be charged for this service.\"    Patient has given verbal consent for Video visit? Yes  How would you like to obtain your AVS? MyChart  If you are dropped from the video visit, the video invite should be resent to: Text to cell phone: [phone]  Will anyone else be joining your video visit? No  {If patient encounters technical issues they should call [phone]      Video-Visit Details    Type of service:  Video Visit    Video Start Time: 10:26 am  Video End Time: 10:56 am    Originating Location (pt. Location): Home    Distant Location (provider location): Offsite (providers home)     Platform used for Video Visit: [employer]    During this virtual visit the patient is located in [place], patient verifies this as the location " "during the entirety of this visit.     Return Bariatric Nutrition Consultation Note    Reason For Visit: Nutrition Assessment    Thelma Uribe is a 57 year old presenting today for return bariatric nutrition consult and nutrition education regarding clear and low-fat full liquid diet for post-bariatric surgery.  Pt is interested in laparoscopic sleeve gastrectomy.    This is pt's 4th required nutrition visits prior to surgery.      Patient referred by Dr. Graham on August 30, 2021 for MWM and by Charis Fuller NP Feb 2023 for WLS.     CO-MORBIDITIES OF OBESITY INCLUDE:       2/6/2023   I have the following health issues associated with obesity: High Cholesterol, Sleep Apnea, Polycystic Ovarian Syndrome, Infertility, Osteoarthritis (joint disease), Hypothyroidism   I have the following symptoms associated with obesity: -     PMH: hip pain, knee pain, fatigue    SUPPORT:      2/6/2023    10:24 AM   Support System Reviewed With Patient   Who do you have in your support network that can be available to help you for the first 2 weeks after surgery? wife, sisters, friends, other family   Who can you count on for support throughout your weight loss surgery journey? wife, sisters, friends       ANTHROPOMETRICS:  Weight 8/30/21: 245 lbs with BMI 40.96    Estimated body mass index is 37.76 kg/m  as calculated from the following:    Height as of this encounter: 1.626 m (5' 4\").    Weight as of this encounter: 99.8 kg (220 lb).     Current weight: 220 lbs, pt report     Goal weight: 235 lbs        2/6/2023    10:24 AM   --   I have tried the following methods to lose weight Watching portions or calories    Exercise    Weight Watchers    Pre packaged meals ex: Nutrisystem    Slimfast    Prescription Medications    Physician directed program           2/6/2023    10:24 AM   Weight Loss Questions Reviewed With Patient   How long have you been overweight? Since late 20's to early 40's     Medications:  Wegovy "     Supplements:  Womens MVI with iron  Fiber     NUTRITION HISTORY:  Per RD note 8/30/21:     Worked with nutritionist in 2019 - recommended by . Pushed supplements and supplement testing. Did like nutrition part of it - did elimination diet to determine allergies.  Never got to add back in phase - had bad accident. Fell off a ladder - bed ridden 3 months.      Was doing 1/2 plate greens, protein  Got away from carbs, dairy, alcohol   Wasn't the best way to live - was really strict with but lost weight.   All or nothing person - off of eating plan currrently     Goals per pt: work on implementing an eating plan, discussion on dietary recommendations      Typical day  Breakfast: yogurt,  fruit overnight oats  Lunch: Salad, chicken, hard boiled eggs, veggies  Dinner: chicken, salads, potatoes, meats  Snacks: candy, chocolate malts     Not a fan of logging food. Discussed plate method and intuitive eating.     2/10/23:  Patient attended [facility] Nutrition Class    3/10/23: Pt states she has been doing well since the nutrition class. Wegovy has been helping reduce appetite and cravings. Has continued to lose weight and has now met goal weight. Working through task list. Doing well with practicing pre-surgery diet guidelines.     5/11/23: Patient had questions on surgical versus non-surgical route. Discussed health benefits of each and long-term weight loss data. Patient is meeting with Dr. Leyva in June to discuss further but plans to continue with surgery route at this time. She does not want to be on meds long-term. Doing well with pre-surgery goals, has made significant lifestyle changes. Did not discuss current diet in detail - time focusing on answering questions related to lifestyle habits post surgery.    7/25/23:  Met with Dr. Leyva - went well. Wants to continue with Wegovy and weight loss while preparing for surgery.     Completed post op education today. Answered pt questions.     Additional  "information:  FT Physician assistant      Lives with wife - Mayda  No children     \"serious accident dec 2019. Broke both legs, long recovery. I have permanent limits to mobility. I am concerned about my weight and how it will affect me long term. I need to get this weight off my joints.\"        2/6/2023    10:24 AM   Recall Diet Questions Reviewed With Patient   Describe what you typically consume for breakfast (typical or most recent) don't eat, but would be yogurt, grains, fruit if did or eggs   Describe what you typically consume for lunch (typical or most recent) salad and a protein   Describe what you typically consume for supper (typical or most recent) 2 veggies, a protein, or pasta and protein, or veggies  sweet potatoes, protein   Describe what you typically consume as snacks (typical or most recent) hard boiled, protein bar, fruit, nuts, veggies   How many ounces of water, or other low calorie drinks, do you drink daily (8 oz=1 glass)? 64 oz or more   How many ounces of caffeine (coffee, tea, pop) do you drink daily (8 oz=1 glass)? 16 oz   How many ounces of carbonated (pop, beer, sparkling water) drinks do you drinky daily (8 oz=1 glass)? 0 oz   How many ounces of juice, pop, sweet tea, sports drinks, protein drinks, other sweetened drinks, do you drink daily (8 oz=1 glass)? 8 oz   How many ounces of milk do you drink daily (8 oz=1 glass) 0 oz   How often do you drink alcohol? Monthly or less           2/6/2023    10:24 AM   Eating Habits   Do you have any dietary restrictions? No   Do you currently binge eat (eat a large amount of food in a short time)? No   Are you an emotional eater? Yes   Do you get up to eat after falling asleep? No   What foods do you crave? candy, sweet, French           2/6/2023    10:24 AM   Dining Out History Reviewed With Patient   How often do you dine out? Rarely.   Where do you dine out? (select all that apply) take out   What types of food do you order when you dine out? " Japanese, Bhutanese, Sushi           2/6/2023    10:24 AM   Physical Activity Reviewed With Patient   How often do you exercise? 1 to 2 times per week   What is the duration of your exercise (in minutes)? 45 Minutes   What types of exercise do you do? swimming    home gym    weightlifting   What keeps you from being more active? I am as active as I can possbily be    I have just had surgery on one or more of my joints       EXERCISE:        2/6/2023    10:24 AM   --   How often do you exercise? 1 to 2 times per week   What is the duration of your exercise (in minutes)? 45 Minutes   What types of exercise do you do? swimming    home gym    weightlifting   What keeps you from being more active? I am as active as I can possbily be    I have just had surgery on one or more of my joints     NUTRITION DIAGNOSIS:  Obesity r/t long history of positive energy balance aeb BMI >30 kg/m2.    Food- and Nutrition-related knowledge deficit r/t lack of prior exposure to information AEB pt scheduled for upcoming bariatric surgery and pt interest in diet education/review    INTERVENTION:  Intervention Provided/Education Provided/Reviewed previous goals and encouraged patient to continue goals prior to surgery.     Provided instruction on bariatric clear and low-fat full liquid diets.    Provided the following handouts: Diet Guidelines for Bariatric Surgery, Your Stage 1-5 Diet, Keeping Track of Your Fluids, list of recommended vitamin/mineral supplementation after sleeve gastrectomy surgery and RD contact information.             2/6/2023    10:24 AM   Questions Reviewed With Patient   How ready are you to make changes regarding your weight? Number 1 = Not ready at all to make changes up to 10 = very ready. 10   How confident are you that you can change? 1 = Not confident that you will be successful making changes up to 10 = very confident. 10       Expected Engagement: good    GOALS:  Previous/Continued:  Relating To Eating:  - Eat slowly  (20-30 minutes per meal), chewing foods well (25 chews per bite/applesauce consistency)  - Focus on eating smaller portion sizes at meals and snacks    Relating to beverages:  - Reduce caffeine/carbonation/calorie containing beverages  - Separate fluids from meals by 30 minutes before, during, and after eating  - Drink 48-64 ounces of fluid per day. Small, frequent sips between meals.    Relating to activity:  - Increase activity as able    Chewable Multivitamin Options for After Surgery:  Bariatric Advantage Advanced Multi EA chewable  *This is the one I was referring to that would for sure meet post op recommended needs.  [email or web address] Edge/item/chewable-advanced-multi-ea  Take 2 chews daily. Additional calcium citrate supplement needed.  - Validation code for Discount on Bariatric Advantage vitamin/mineral supplements: FSWLSBA    Breath of Lifeebrate Multi Complete 45:  *This one also meets needs well. I am not as familiar with it as Bariatric Advantage products but is another great option from what I do know. https://[employer]/products/jgrjc-wtosxtsg-89?[email or web address]  Take 2 chews daily. Additional calcium citrate supplement needed.    [place]'s Complete, or generic (with 10 mg iron per chew)   (https://[email or web address] Edge/p/kids-39-complete-multivitamin-chewable-tablets-orange-grape-38-cherry-150ct-up-38-up-8482/-/[email or web address]#lnk=sametab)   Take 2 chews per day. Additional B12 and calcium citrate supplements needed. Potential need for additional iron supplement (if needing more than 20 mg iron per day). Is lower in thiamine than ideally recommended    Goals after surgery:   1. Follow the bariatric post-op diet advancement schedule (TBD)     2. Sip on 48-64 oz (or greater) fluids daily, recording intake to help stay on-track.  - Drink at least 1-2 oz of fluid every 15-30 min    3. Stop vitamins/minerals 1 week before surgery. We will discuss starting a chewable multivitamin at the one week  post-op visit    4. Work towards consuming 60 gm protein daily     Post-op Diet Handouts:  Diet Guidelines after Weight-loss Surgery  [email or web address]     Your Stage 1 Diet: Clear Liquids  [email or web address]     Your Stage 2 Diet: Low-fat Full Liquids  [email or web address]     Your Stage 3 Diet: Pureed Foods  [email or web address]     Pureed Recipes  [email or web address]    Your Stage 4 Diet: Soft Foods  [email or web address]    Your Stage 5 Diet: Regular Foods  [email or web address]    Supplements after Sleeve Gastrectomy, Gastric Bypass or Single Anastomosis Duodenal Switch  [email or web address]    Keeping Track of Fluids  [email or web address]      Follow up:   Wednesday, November 1st at 1:00 pm    Time spent with patient: 30 minutes  YURI Prabhakar, RD, LD

## 2023-07-25 NOTE — NURSING NOTE
Is the patient currently in the state of [place]? YES    Visit mode:VIDEO    If the visit is dropped, the patient can be reconnected by: VIDEO VISIT: Text to cell phone: [phone]    Will anyone else be joining the visit? NO      How would you like to obtain your AVS? MyChart    Are changes needed to the allergy or medication list? NO    Reason for visit: ASHLEY Dalal on 7/25/2023 at 10:18 AM

## 2023-07-25 NOTE — PROGRESS NOTES
"Thelma Uribe is a 57 year old female who is being evaluated via a billable video visit.      The patient has been notified of following:     \"This video visit will be conducted via a call between you and your physician/provider. We have found that certain health care needs can be provided without the need for an in-person physical exam.  This service lets us provide the care you need with a video conversation.  If a prescription is necessary we can send it directly to your pharmacy.  If lab work is needed we can place an order for that and you can then stop by our lab to have the test done at a later time.    Video visits are billed at different rates depending on your insurance coverage.  Please reach out to your insurance provider with any questions.    If during the course of the call the physician/provider feels a video visit is not appropriate, you will not be charged for this service.\"    Patient has given verbal consent for Video visit? Yes  How would you like to obtain your AVS? MyChart  If you are dropped from the video visit, the video invite should be resent to: Text to cell phone: [phone]  Will anyone else be joining your video visit? No  {If patient encounters technical issues they should call [phone]      Video-Visit Details    Type of service:  Video Visit    Video Start Time: 10:26 am  Video End Time: 10:56 am    Originating Location (pt. Location): Home    Distant Location (provider location): Offsite (providers home)     Platform used for Video Visit: [employer]    During this virtual visit the patient is located in [place], patient verifies this as the location during the entirety of this visit.     Return Bariatric Nutrition Consultation Note    Reason For Visit: Nutrition Assessment    Thelma Uribe is a 57 year old presenting today for return bariatric nutrition consult and nutrition education regarding clear and low-fat full liquid diet for post-bariatric surgery.  Pt is " "interested in laparoscopic sleeve gastrectomy.    This is pt's 4th required nutrition visits prior to surgery.      Patient referred by Dr. Graham on August 30, 2021 for MWM and by Charis Fuller NP Feb 2023 for WLS.     CO-MORBIDITIES OF OBESITY INCLUDE:       2/6/2023   I have the following health issues associated with obesity: High Cholesterol, Sleep Apnea, Polycystic Ovarian Syndrome, Infertility, Osteoarthritis (joint disease), Hypothyroidism   I have the following symptoms associated with obesity: -     PMH: hip pain, knee pain, fatigue    SUPPORT:      2/6/2023    10:24 AM   Support System Reviewed With Patient   Who do you have in your support network that can be available to help you for the first 2 weeks after surgery? wife, sisters, friends, other family   Who can you count on for support throughout your weight loss surgery journey? wife, sisters, friends       ANTHROPOMETRICS:  Weight 8/30/21: 245 lbs with BMI 40.96    Estimated body mass index is 37.76 kg/m  as calculated from the following:    Height as of this encounter: 1.626 m (5' 4\").    Weight as of this encounter: 99.8 kg (220 lb).     Current weight: 220 lbs, pt report     Goal weight: 235 lbs        2/6/2023    10:24 AM   --   I have tried the following methods to lose weight Watching portions or calories    Exercise    Weight Watchers    Pre packaged meals ex: Nutrisystem    Slimfast    Prescription Medications    Physician directed program           2/6/2023    10:24 AM   Weight Loss Questions Reviewed With Patient   How long have you been overweight? Since late 20's to early 40's     Medications:  Wegovy     Supplements:  Womens MVI with iron  Fiber     NUTRITION HISTORY:  Per RD note 8/30/21:     Worked with nutritionist in 2019 - recommended by . Pushed supplements and supplement testing. Did like nutrition part of it - did elimination diet to determine allergies.  Never got to add back in phase - had bad accident. Fell " "off a ladder - bed ridden 3 months.      Was doing 1/2 plate greens, protein  Got away from carbs, dairy, alcohol   Wasn't the best way to live - was really strict with but lost weight.   All or nothing person - off of eating plan currrently     Goals per pt: work on implementing an eating plan, discussion on dietary recommendations      Typical day  Breakfast: yogurt,  fruit overnight oats  Lunch: Salad, chicken, hard boiled eggs, veggies  Dinner: chicken, salads, potatoes, meats  Snacks: candy, chocolate malts     Not a fan of logging food. Discussed plate method and intuitive eating.     2/10/23:  Patient attended [facility] Nutrition Class    3/10/23: Pt states she has been doing well since the nutrition class. Wegovy has been helping reduce appetite and cravings. Has continued to lose weight and has now met goal weight. Working through task list. Doing well with practicing pre-surgery diet guidelines.     5/11/23: Patient had questions on surgical versus non-surgical route. Discussed health benefits of each and long-term weight loss data. Patient is meeting with Dr. Leyva in June to discuss further but plans to continue with surgery route at this time. She does not want to be on meds long-term. Doing well with pre-surgery goals, has made significant lifestyle changes. Did not discuss current diet in detail - time focusing on answering questions related to lifestyle habits post surgery.    7/25/23:  Met with Dr. Leyva - went well. Wants to continue with Wegovy and weight loss while preparing for surgery.     Completed post op education today. Answered pt questions.     Additional information:  FT Physician assistant      Lives with wife - Mayda  No children     \"serious accident dec 2019. Broke both legs, long recovery. I have permanent limits to mobility. I am concerned about my weight and how it will affect me long term. I need to get this weight off my joints.\"        2/6/2023    10:24 AM   Recall Diet " Questions Reviewed With Patient   Describe what you typically consume for breakfast (typical or most recent) don't eat, but would be yogurt, grains, fruit if did or eggs   Describe what you typically consume for lunch (typical or most recent) salad and a protein   Describe what you typically consume for supper (typical or most recent) 2 veggies, a protein, or pasta and protein, or veggies  sweet potatoes, protein   Describe what you typically consume as snacks (typical or most recent) hard boiled, protein bar, fruit, nuts, veggies   How many ounces of water, or other low calorie drinks, do you drink daily (8 oz=1 glass)? 64 oz or more   How many ounces of caffeine (coffee, tea, pop) do you drink daily (8 oz=1 glass)? 16 oz   How many ounces of carbonated (pop, beer, sparkling water) drinks do you drinky daily (8 oz=1 glass)? 0 oz   How many ounces of juice, pop, sweet tea, sports drinks, protein drinks, other sweetened drinks, do you drink daily (8 oz=1 glass)? 8 oz   How many ounces of milk do you drink daily (8 oz=1 glass) 0 oz   How often do you drink alcohol? Monthly or less           2/6/2023    10:24 AM   Eating Habits   Do you have any dietary restrictions? No   Do you currently binge eat (eat a large amount of food in a short time)? No   Are you an emotional eater? Yes   Do you get up to eat after falling asleep? No   What foods do you crave? candy, sweet, Kosovan           2/6/2023    10:24 AM   Dining Out History Reviewed With Patient   How often do you dine out? Rarely.   Where do you dine out? (select all that apply) take out   What types of food do you order when you dine out? Bahamian, , Sushi           2/6/2023    10:24 AM   Physical Activity Reviewed With Patient   How often do you exercise? 1 to 2 times per week   What is the duration of your exercise (in minutes)? 45 Minutes   What types of exercise do you do? swimming    home gym    weightlifting   What keeps you from being more active? I am as  active as I can possdeshawny be    I have just had surgery on one or more of my joints       EXERCISE:        2/6/2023    10:24 AM   --   How often do you exercise? 1 to 2 times per week   What is the duration of your exercise (in minutes)? 45 Minutes   What types of exercise do you do? swimming    home gym    weightlifting   What keeps you from being more active? I am as active as I can hiral be    I have just had surgery on one or more of my joints     NUTRITION DIAGNOSIS:  Obesity r/t long history of positive energy balance aeb BMI >30 kg/m2.    Food- and Nutrition-related knowledge deficit r/t lack of prior exposure to information AEB pt scheduled for upcoming bariatric surgery and pt interest in diet education/review    INTERVENTION:  Intervention Provided/Education Provided/Reviewed previous goals and encouraged patient to continue goals prior to surgery.     Provided instruction on bariatric clear and low-fat full liquid diets.    Provided the following handouts: Diet Guidelines for Bariatric Surgery, Your Stage 1-5 Diet, Keeping Track of Your Fluids, list of recommended vitamin/mineral supplementation after sleeve gastrectomy surgery and RD contact information.             2/6/2023    10:24 AM   Questions Reviewed With Patient   How ready are you to make changes regarding your weight? Number 1 = Not ready at all to make changes up to 10 = very ready. 10   How confident are you that you can change? 1 = Not confident that you will be successful making changes up to 10 = very confident. 10       Expected Engagement: good    GOALS:  Previous/Continued:  Relating To Eating:  - Eat slowly (20-30 minutes per meal), chewing foods well (25 chews per bite/applesauce consistency)  - Focus on eating smaller portion sizes at meals and snacks    Relating to beverages:  - Reduce caffeine/carbonation/calorie containing beverages  - Separate fluids from meals by 30 minutes before, during, and after eating  - Drink 48-64  ounces of fluid per day. Small, frequent sips between meals.    Relating to activity:  - Increase activity as able    Chewable Multivitamin Options for After Surgery:  Bariatric Advantage Advanced Multi EA chewable  *This is the one I was referring to that would for sure meet post op recommended needs.  https://www.[employer]/item/chewable-advanced-multi-ea  Take 2 chews daily. Additional calcium citrate supplement needed.  - Validation code for Discount on Bariatric Advantage vitamin/mineral supplements: FSWLSBA    Celebrate Multi Complete 45:  *This one also meets needs well. I am not as familiar with it as Bariatric Advantage products but is another great option from what I do know. https://FightMe/products/sefii-susppfsi-83?[email or web address]  Take 2 chews daily. Additional calcium citrate supplement needed.    Palestine's Complete, or generic (with 10 mg iron per chew)   (https://www.Datalink.[employer]/p/kids-39-complete-multivitamin-chewable-tablets-orange-grape-38-cherry-150ct-up-38-up-8482/-/A-[email or web address]#lnk=sametab)   Take 2 chews per day. Additional B12 and calcium citrate supplements needed. Potential need for additional iron supplement (if needing more than 20 mg iron per day). Is lower in thiamine than ideally recommended    Goals after surgery:   1. Follow the bariatric post-op diet advancement schedule (TBD)     2. Sip on 48-64 oz (or greater) fluids daily, recording intake to help stay on-track.  - Drink at least 1-2 oz of fluid every 15-30 min    3. Stop vitamins/minerals 1 week before surgery. We will discuss starting a chewable multivitamin at the one week post-op visit    4. Work towards consuming 60 gm protein daily     Post-op Diet Handouts:  Diet Guidelines after Weight-loss Surgery  [email or web address]     Your Stage 1 Diet: Clear Liquids  [email or web address]     Your Stage 2 Diet: Low-fat Full Liquids  [email or web address]     Your Stage 3  Diet: Pureed Foods  [email or web address]     Pureed Recipes  [email or web address]    Your Stage 4 Diet: Soft Foods  [email or web address]    Your Stage 5 Diet: Regular Foods  [email or web address]    Supplements after Sleeve Gastrectomy, Gastric Bypass or Single Anastomosis Duodenal Switch  [email or web address]    Keeping Track of Fluids  [email or web address]      Follow up:   Wednesday, November 1st at 1:00 pm    Time spent with patient: 30 minutes  YURI Prabhakar, RD, LD

## 2023-07-25 NOTE — PATIENT INSTRUCTIONS
GOALS:  Previous/Continued:  Relating To Eating:  - Eat slowly (20-30 minutes per meal), chewing foods well (25 chews per bite/applesauce consistency)  - Focus on eating smaller portion sizes at meals and snacks    Relating to beverages:  - Reduce caffeine/carbonation/calorie containing beverages  - Separate fluids from meals by 30 minutes before, during, and after eating  - Drink 48-64 ounces of fluid per day. Small, frequent sips between meals.    Relating to activity:  - Increase activity as able    Chewable Multivitamin Options for After Surgery:  Bariatric Advantage Advanced Multi EA chewable  *This is the one I was referring to that would for sure meet post op recommended needs.  https://www.Getbazza.M-Files/item/chewable-advanced-multi-ea  Take 2 chews daily. Additional calcium citrate supplement needed.  - Validation code for Discount on Bariatric Advantage vitamin/mineral supplements: FSWLSBA    Codesign Cooperative Multi Complete 45:  *This one also meets needs well. I am not as familiar with it as Bariatric Advantage products but is another great option from what I do know. https://[employer]/products/zemte-plwrcuon-80?welkfiw=[email or web address]  Take 2 chews daily. Additional calcium citrate supplement needed.    Charl[place]'s Complete, or generic (with 10 mg iron per chew)   (https://www.PlanGrid.M-Files/p/kids-39-complete-multivitamin-chewable-tablets-orange-grape-38-cherry-150ct-up-38-up-8482/-/A-56739093#lnk=sametab)   Take 2 chews per day. Additional B12 and calcium citrate supplements needed. Potential need for additional iron supplement (if needing more than 20 mg iron per day). Is lower in thiamine than ideally recommended    Goals after surgery:   1. Follow the bariatric post-op diet advancement schedule (TBD)     2. Sip on 48-64 oz (or greater) fluids daily, recording intake to help stay on-track.  - Drink at least 1-2 oz of fluid every 15-30 min    3. Stop vitamins/minerals 1 week before surgery.  We will discuss starting a chewable multivitamin at the one week post-op visit    4. Work towards consuming 60 gm protein daily     Post-op Diet Handouts:  Diet Guidelines after Weight-loss Surgery  [email or web address]     Your Stage 1 Diet: Clear Liquids  [email or web address]     Your Stage 2 Diet: Low-fat Full Liquids  [email or web address]     Your Stage 3 Diet: Pureed Foods  [email or web address]     Pureed Recipes  [email or web address]    Your Stage 4 Diet: Soft Foods  [email or web address]    Your Stage 5 Diet: Regular Foods  [email or web address]    Supplements after Sleeve Gastrectomy, Gastric Bypass or Single Anastomosis Duodenal Switch  [email or web address]    Keeping Track of Fluids  [email or web address]      Follow up:   Wednesday, November 1st at 1:00 pm    YURI Miramontes, RD, LD  Clinic #: [phone]

## 2023-08-03 ENCOUNTER — VIRTUAL VISIT (OUTPATIENT)
Dept: ENDOCRINOLOGY | Facility: CLINIC | Age: 58
End: 2023-08-03
Payer: COMMERCIAL

## 2023-08-03 VITALS — HEIGHT: 64 IN | BODY MASS INDEX: 37.22 KG/M2 | WEIGHT: 218 LBS

## 2023-08-03 DIAGNOSIS — E66.813 CLASS 3 SEVERE OBESITY WITH SERIOUS COMORBIDITY AND BODY MASS INDEX (BMI) OF 40.0 TO 44.9 IN ADULT, UNSPECIFIED OBESITY TYPE (H): ICD-10-CM

## 2023-08-03 DIAGNOSIS — E66.01 CLASS 3 SEVERE OBESITY WITH SERIOUS COMORBIDITY AND BODY MASS INDEX (BMI) OF 40.0 TO 44.9 IN ADULT, UNSPECIFIED OBESITY TYPE (H): ICD-10-CM

## 2023-08-03 PROCEDURE — 99213 OFFICE O/P EST LOW 20 MIN: CPT | Mod: VID | Performed by: NURSE PRACTITIONER

## 2023-08-03 RX ORDER — SEMAGLUTIDE 2.4 MG/.75ML
2.4 INJECTION, SOLUTION SUBCUTANEOUS
Qty: 3 ML | Refills: 5 | Status: SHIPPED | OUTPATIENT
Start: 2023-08-03 | End: 2024-04-19

## 2023-08-03 ASSESSMENT — PAIN SCALES - GENERAL: PAINLEVEL: NO PAIN (0)

## 2023-08-03 NOTE — PATIENT INSTRUCTIONS
"Braden Loera, it was nice to meet you today!  Thank you for allowing us the privilege of caring for you. We hope we provided you with the excellent service you deserve.   Please let us know if there is anything else we can do for you so that we can be sure you are completely satisfied with your care experience.    To ensure the quality of our services you may be receiving a patient satisfaction survey from an independent patient satisfaction monitoring company.    The greatest compliment you can give is a \"Likely to Recommend\"    Your visit was with Charis Fuller NP today.    Instructions per today's visit:     Follow up  plan:  Continue 2.4mg wegovy  Keep up the great work!   Follow up December 2022  ___________________________________________________________________________  Important contact and scheduling information:  Please call our contact center at [phone] to schedule your next appointments.  For any nursing questions or concerns call Harika Betancourt LPN at [phone] or Filomena Quezada RN at [phone]  Please call during clinic hours Monday through Friday 8:00a - 4:00p if you have questions or you can contact us via [employer] at anytime and we will reply during clinic hours.    Lab results will be communicated through My Chart or letter (if My Chart not used). Please call the clinic if you have not received communication after 1 week or if you have any questions.?  Clinic Fax: [phone]  __________________________________________________________________________    If labs were ordered today:    Please make an appointment to have them drawn at your convenience.     To schedule the Lab Appointment using [employer]:  Select \"Schedule an Appointment\"  Select \"Lab Only\"  For \"A couple of questions\", select \"Other\"  For \"Which locations work for you?, select the location and set up the appointment    To schedule by phone call [phone] to schedule a lab only appointment at [facility] " lab.  ___________________________________________________________________________  Work with A Health !  Virtual Sessions are Available through [facility]    To learn more, call to schedule a free, Health  Q&A appointment: [phone]     What is Health Coaching?  Do you know what you are supposed to do, but you just aren't doing it?  Then, HEALTH COACHING may help you!   Get unstuck and move forward with the support of a professionally trained NBC-HWC (National Board-Certified Health and ) who uses evidence-based approaches to help you move forward with healthy lifestyle changes in the areas of weight loss, stress management and overall well-being.    Health Coaches help you identify goals that will work best for you. Health Coaches provide support and encouragement with overcoming barriers and help you to find inspiration and motivation to lead a healthy lifestyle.    Option one:  Health Coaching 3-Pack; Three, 30-minute Health Coaching Visits, for $99  Visits are done virtually (phone or video)  This is a self pay service; we do not accept insurance for barbara coaching.    Option two:   The 24 week Plan; 11 Health Coaching Visits, and a 7 months subscription to [employer]-- on-demand fitness, nutrition and mindfulness classes, for $499 (employee discounts may be available). Participants will also meet regularly with a weight management Medical Provider and a Registered/Licensed Dietician.  This is a self-pay service; we do not accept insurance for health coaching.    To Schedule a free Health  Q&A appointment to learn more,  call [phone].  ____________________________________________________________________    [facility]   Healthy Lifestyle Virtual Support Group    Healthy Lifestyle Virtual Support Group?  This is 60 minutes of small group guided discussion, support and resources. All are welcome who  want a healthy lifestyle.  WHEN: Starting in July 2023, this group meets the 1st Friday of the month from 12:30 PM - 1:30 PM virtually using Microsoft Teams.    FACILITATOR: Led by National Board Certified Health and , Rebeka Lu [facility].   TO REGISTER: Please send an email to Rebeka [email or web address] to receive monthly invites to the group or if you have any questions about having a health .  Prior to the meeting, a link with directions on how to join the meeting will be sent to you.    2023 Meetings  May 19: Let's Talk  June 9: Create Your Coaching Toolkit: Learn How to  Yourself  July 7: Let's Talk  August 4: Benefits of Fiber with MARK Prabhakar  September 1: Show and Tell (share your aps, podcasts, recipes, hacks, books)  October 6 :Let's Talk  November 3: Introduction to Mindfulness   December 1: Let's Talk    If you would like bariatric surgery specific support group info please let your care team know.         Thank you,   [facility] Weight Management Team

## 2023-08-03 NOTE — LETTER
8/3/2023       RE: Thelma Uribe  [address]  [facility] [address]     Dear Colleague,    Thank you for referring your patient, Thelma Uribe, to the [facility] [place] at [facility]. Please see a copy of my visit note below.      Pre-Bariatric Surgery Note    Tiana Macdonald    Date: 8/3/2023     RE: Thelma Uribe    MR#: [id number]   : 1965   Date of Visit: Aug 3, 2023    REASON FOR VISIT: Preoperative evaluation for possible weight loss surgery    Dear Tiana Tompkins,    I had the pleasure of seeing your patient, Thelma Uribe, in my preoperative bariatric clinic.    As you know, she has morbid obesity and is considering weight loss surgery to treat obesity in association with her medical conditions of obesity.  Her consult weight was 238.  She has lost 20 pounds since her consult weight. She has met her required pre-surgery weight. Please refer to initial consult note from date 2023 for patient's weight history and co-morbidities.    Last seen 2023- continue wegovy, finish clearances  Psych- cleared 2023- Dr. Butler   Pcp  Dr. Leyva -2023    Wegovy 2.4mg     More aware of portions. More aware of composition of meals.   Water therapy 4 days a week  Strength training 2 days a week   12,000 steps on some days   Goal is to be under 200lb     Plan to travel out of country in December     Assessment & Plan  Problem List Items Addressed This Visit          Digestive    Class 3 severe obesity with serious comorbidity and body mass index (BMI) of 40.0 to 44.9 in adult (H)     Doing well on wegovy 2.4mg losing 0.5-1lb per seek. Had stall at 220lb for a while. Has developed great activity habits including increasing walking and strength training twice weekly. Has been seeing body changes more than movement in the scale, suspect muscle gain. Hunger/ cravings well  controlled, continues to work with dietitian.     Discussed surgery vs medication long term. Discussed realistic expectation of medication being needed long term. Ready for surgery just trying to decide now. Questions answered. Will plan to reassess in December. She'd like to be under 200lb by then or thinks she'll move forward with surgery.            Relevant Medications    Semaglutide-Weight Management (WEGOVY) 2.4 MG/0.75ML pen        Reviewed tasklist with patient     Most recent weights:  Wt Readings from Last 4 Encounters:   08/03/23 98.9 kg (218 lb)   07/25/23 99.8 kg (220 lb)   07/20/23 100.2 kg (220 lb 12.8 oz)   07/10/23 99.8 kg (220 lb 2 oz)         ROS    Past Medical History:   Diagnosis Date    Dysthymic disorder     much improved, took celexa x 1mo in '06    Elevated blood pressure reading without diagnosis of hypertension     in '10, much improved with diet/exercise    Exercise induced bronchospasm     takes albuterol very rarely    Female infertility of other specified origin     stopped trying    GERD (gastroesophageal reflux disease)     prilosec for awhile, stable off for yrs (2/16)    History of blood transfusion 12/19    hospitalization, 3 units    Thyroid disease        Past Surgical History:   Procedure Laterality Date    BACK SURGERY  2001    lumbar discectomy    COLONOSCOPY  2018    COLONOSCOPY WITH CO2 INSUFFLATION N/A 06/20/2016    Procedure: COLONOSCOPY WITH CO2 INSUFFLATION;  Surgeon: Miguelito Erazo MD;  Location: [facility] OR    GYN SURGERY      HC REMOVE TONSILS/ADENOIDS,12+ Y/O  Age 14    left navicular fracture   12/17/2019    ORIF right tibial plateau fracture Right 12/26/2019    ORTHOPEDIC SURGERY      percutaneous pinning of foot Left 12/26/2019    right knee external fixator Right 12/17/2019    right leg fasciotomy with wound vac placement  12/17/2019    SOFT TISSUE SURGERY  12/19    right leg fasciotomy    THYROIDECTOMY  05/08/2012    Procedure:THYROIDECTOMY; LEFT THYROID LOBECTOMY;  Surgeon:STEPHANIA FERNÁNDEZ; Location: OR    UPPER GI ENDOSCOPY  02/2012    bx's okay, rec different PPI    ZC APPENDECTOMY  Age 11    [facility] NONSPECIFIC PROCEDURE  1988    Right oophorectomy and dermoid cyst removal    [facility] NONSPECIFIC PROCEDURE  1997    Partial Left oophorectomy and dermoid cyst removal    [facility] NONSPECIFIC PROCEDURE  2002    L 4-5 herniated disc w/ laminectomy    [facility] NONSPECIFIC PROCEDURE  04/2003    Midcarpal Instability-Right wrist reconstruction    [facility] NONSPECIFIC PROCEDURE  1999    Left breast biopsy-benign       Current Outpatient Medications   Medication    Semaglutide-Weight Management (WEGOVY) 2.4 MG/0.75ML pen    Multiple Vitamin (THERAPEUTIC MULTIVITAMIN PO)    naproxen sodium (ANAPROX) 220 MG tablet    SYNTHROID 112 MCG tablet     No current facility-administered medications for this visit.       Allergies   Allergen Reactions    Hydrocodone-Acetaminophen Hives, Itching and Nausea and Vomiting    Oxycodone Hives, Itching, Nausea and Vomiting and Rash       Lab on 05/02/2023   Component Date Value Ref Range Status    WBC Count 05/02/2023 8.2  4.0 - 11.0 10e3/uL Final    RBC Count 05/02/2023 4.99  3.80 - 5.20 10e6/uL Final    Hemoglobin 05/02/2023 14.3  11.7 - 15.7 g/dL Final    Hematocrit 05/02/2023 43.5  35.0 - 47.0 % Final    MCV 05/02/2023 87  78 - 100 fL Final    MCH 05/02/2023 28.7  26.5 - 33.0 pg Final    MCHC 05/02/2023 32.9  31.5 - 36.5 g/dL Final    RDW 05/02/2023 12.4  10.0 - 15.0 % Final    Platelet Count 05/02/2023 196  150 - 450 10e3/uL Final    Sodium 05/02/2023 140  136 - 145 mmol/L Final    Potassium 05/02/2023 4.0  3.4 - 5.3 mmol/L Final    Chloride 05/02/2023 102  98 - 107 mmol/L Final    Carbon Dioxide (CO2) 05/02/2023 28  22 - 29 mmol/L Final    Anion Gap 05/02/2023 10  7 - 15 mmol/L Final    Urea Nitrogen 05/02/2023 18.2  6.0 - 20.0 mg/dL Final    Creatinine 05/02/2023 0.75  0.51 - 0.95 mg/dL Final    Calcium 05/02/2023 10.1 (H)  8.6 - 10.0 mg/dL Final    Glucose  "05/02/2023 106 (H)  70 - 99 mg/dL Final    Alkaline Phosphatase 05/02/2023 87  35 - 104 U/L Final    AST 05/02/2023 16  10 - 35 U/L Final    ALT 05/02/2023 21  10 - 35 U/L Final    Protein Total 05/02/2023 7.8  6.4 - 8.3 g/dL Final    Albumin 05/02/2023 4.4  3.5 - 5.2 g/dL Final    Bilirubin Total 05/02/2023 0.4  <=1.2 mg/dL Final    GFR Estimate 05/02/2023 >90  >60 mL/min/1.73m2 Final    eGFR calculated using 2021 CKD-EPI equation.    Lipase 05/02/2023 29  13 - 60 U/L Final    Color Urine 05/02/2023 Yellow  Colorless, Straw, Light Yellow, Yellow Final    Appearance Urine 05/02/2023 Clear  Clear Final    Glucose Urine 05/02/2023 Negative  Negative mg/dL Final    Bilirubin Urine 05/02/2023 Negative  Negative Final    Ketones Urine 05/02/2023 Negative  Negative mg/dL Final    Specific Gravity Urine 05/02/2023 1.015  1.003 - 1.035 Final    Blood Urine 05/02/2023 Negative  Negative Final    pH Urine 05/02/2023 6.0  5.0 - 7.0 Final    Protein Albumin Urine 05/02/2023 Negative  Negative mg/dL Final    Urobilinogen Urine 05/02/2023 Normal  Normal, 2.0 mg/dL Final    Nitrite Urine 05/02/2023 Negative  Negative Final    Leukocyte Esterase Urine 05/02/2023 Negative  Negative Final    Mucus Urine 05/02/2023 Present (A)  None Seen /LPF Final    RBC Urine 05/02/2023 0  <=2 /HPF Final    WBC Urine 05/02/2023 1  <=5 /HPF Final    Hemoglobin A1C 05/02/2023 5.3  <5.7 % Final    Normal <5.7%   Prediabetes 5.7-6.4%    Diabetes 6.5% or higher     Note: Adopted from ADA consensus guidelines.       PHYSICAL EXAM:  Objective   Ht 1.626 m (5' 4\")   Wt 98.9 kg (218 lb)   LMP 09/10/2012 (Exact Date)   BMI 37.42 kg/m    Vitals - Patient Reported  Pain Score: No Pain (0)        Physical Exam   GENERAL: Healthy, alert and no distress  EYES: Eyes grossly normal to inspection.  No discharge or erythema, or obvious scleral/conjunctival abnormalities.  RESP: No audible wheeze, cough, or visible cyanosis.  No visible retractions or increased work " of breathing.    SKIN: Visible skin clear. No significant rash, abnormal pigmentation or lesions.  NEURO: Cranial nerves grossly intact.  Mentation and speech appropriate for age.  PSYCH: Mentation appears normal, affect normal/bright, judgement and insight intact, normal speech and appearance well-groomed.    Sleeve Gastrectomy: Risks and Side Effects    The complications or risks of surgery include but are not limited to: death, heart attack, infection in the surgical site (wound infection), abdomen (abscess), bladder (urinary tract infection), lungs (pneumonia), clots in legs (deep vein thrombosis) or lungs (pulmonary emboli),  injury to the bowels or other organs, bowel obstruction, hernia at the incision and gastrointestional bleeding.    More specific risks related to vertical sleeve gastrectomy were detailed at the bariatric informational seminar and include the following: leak at the vertical sleeve staple line, leak at the anastomoses,  nausea, vomiting, and dehydration for several months,  adhesions causing bowel obstruction, rapid weight loss causing a higher rate of gallstone formation during the first 6 months after surgery, decreased absorption of vitamins and protein because of the reduced stomach size, weight regain if inappropriate food intake occurs, stricture, injury to other organs, hernia,  and ulcers.       Side effects of bariatric surgery include but are not limited to: abdominal pain, cramping, bloating, constipation, nausea, vomiting, diarrhea, difficulty swallowing,  dehydration, hair loss, excess skin, protein, iron and vitamin deficiencies, heartburn, transfer of addictions, increased anxiety and worsening depression.       I emphasized exercise and activity behavior along with appropriate food choice as the main foundation for weight loss with surgery providing surgical reinforcement of the appropriate behavior set.        Review of general surgery weight loss process    1. Complete  preoperative requirements, including weight loss.  Final weight check to confirm MANDATORY weight loss requirement must be documented on a clinic scale.    2. Discuss prior authorization with .    3. History and physical evaluation by PCP of PAC clinic within 30 days of surgery date, preoperative class, and weight check (weigh-in visit) to be scheduled by patient.  Pre-anesthesia clinic for risk evaluation to be scheduled by anesthesia clinic.    4. We cannot guarantee that patient will qualify for surgery unless all preoperative requirements are met, prior authorization from primary insurance company is granted, and insurance changes do not occur.    5. It is possible for patients to regain all weight after weight loss surgery unless they follow guidelines prescribed by our bariatric center.    6. All patients with gastrointestinal complaints after weight loss surgery must have complaints conveyed to the bariatric team for appropriate treatment.    7. Vitamin deficiencies may develop post-bariatric surgery and annual laboratory testing should be performed.    8. Persistent nausea/vomiting after bariatric surgery entails risk of thiamine deficiency and should be treated early.  Vitamin B12 deficiency may develop, especially after gastric bypass surgery and must be recognized.        If you have any questions about our plans please don't hesitate to contact me.    Sincerely,    Charis Fuller NP      22 minutes spent by me on the date of the encounter doing chart review, history and exam, documentation and further activities per the note        Virtual Visit Details    Type of service:  Video Visit     Originating Location (pt. Location): Home    Distant Location (provider location):  Off-site  Platform used for Video Visit: Mult[employer]

## 2023-08-03 NOTE — Clinical Note
Would be interested in surgery after the first of the year if she decides to move forward. Going to continue mwm until then.

## 2023-08-03 NOTE — ASSESSMENT & PLAN NOTE
Doing well on wegovy 2.4mg losing 0.5-1lb per seek. Had stall at 220lb for a while. Has developed great activity habits including increasing walking and strength training twice weekly. Has been seeing body changes more than movement in the scale, suspect muscle gain. Hunger/ cravings well controlled, continues to work with dietitian.     Discussed surgery vs medication long term. Discussed realistic expectation of medication being needed long term. Ready for surgery just trying to decide now. Questions answered. Will plan to reassess in December. She'd like to be under 200lb by then or thinks she'll move forward with surgery.

## 2023-08-03 NOTE — NURSING NOTE
Is the patient currently in the state of [place]? YES    Visit mode:VIDEO    If the visit is dropped, the patient can be reconnected by: VIDEO VISIT: Text to cell phone: [phone]    Will anyone else be joining the visit? NO      How would you like to obtain your AVS? MyChart    Are changes needed to the allergy or medication list? NO    Reason for visit: RECHECK    Vicky RAMIREZ

## 2023-08-03 NOTE — PROGRESS NOTES
Pre-Bariatric Surgery Note    Tiana Macdonald    Date: 8/3/2023     RE: Thelma Uribe    MR#: [id number]   : 1965   Date of Visit: Aug 3, 2023    REASON FOR VISIT: Preoperative evaluation for possible weight loss surgery    Dear Tiana Tompkins,    I had the pleasure of seeing your patient, Thelma Uribe, in my preoperative bariatric clinic.    As you know, she has morbid obesity and is considering weight loss surgery to treat obesity in association with her medical conditions of obesity.  Her consult weight was 238.  She has lost 20 pounds since her consult weight. She has met her required pre-surgery weight. Please refer to initial consult note from date 2023 for patient's weight history and co-morbidities.    Last seen 2023- continue wegovy, finish clearances  Psych- cleared 2023- Dr. Butler   Pcp  Dr. Leyva -2023    Wegovy 2.4mg     More aware of portions. More aware of composition of meals.   Water therapy 4 days a week  Strength training 2 days a week   12,000 steps on some days   Goal is to be under 200lb     Plan to travel out of country in December     Assessment & Plan   Problem List Items Addressed This Visit          Digestive    Class 3 severe obesity with serious comorbidity and body mass index (BMI) of 40.0 to 44.9 in adult (H)     Doing well on wegovy 2.4mg losing 0.5-1lb per seek. Had stall at 220lb for a while. Has developed great activity habits including increasing walking and strength training twice weekly. Has been seeing body changes more than movement in the scale, suspect muscle gain. Hunger/ cravings well controlled, continues to work with dietitian.     Discussed surgery vs medication long term. Discussed realistic expectation of medication being needed long term. Ready for surgery just trying to decide now. Questions answered. Will plan to reassess in December. She'd like to be under 200lb by then or thinks she'll move forward with  surgery.            Relevant Medications    Semaglutide-Weight Management (WEGOVY) 2.4 MG/0.75ML pen        Reviewed tasklist with patient     Most recent weights:  Wt Readings from Last 4 Encounters:   08/03/23 98.9 kg (218 lb)   07/25/23 99.8 kg (220 lb)   07/20/23 100.2 kg (220 lb 12.8 oz)   07/10/23 99.8 kg (220 lb 2 oz)         ROS    Past Medical History:   Diagnosis Date    Dysthymic disorder     much improved, took celexa x 1mo in '06    Elevated blood pressure reading without diagnosis of hypertension     in '10, much improved with diet/exercise    Exercise induced bronchospasm     takes albuterol very rarely    Female infertility of other specified origin     stopped trying    GERD (gastroesophageal reflux disease)     prilosec for awhile, stable off for yrs (2/16)    History of blood transfusion 12/19    hospitalization, 3 units    Thyroid disease        Past Surgical History:   Procedure Laterality Date    BACK SURGERY  2001    lumbar discectomy    COLONOSCOPY  2018    COLONOSCOPY WITH CO2 INSUFFLATION N/A 06/20/2016    Procedure: COLONOSCOPY WITH CO2 INSUFFLATION;  Surgeon: Miguelito Erazo MD;  Location:  OR    GYN SURGERY      HC REMOVE TONSILS/ADENOIDS,12+ Y/O  Age 14    left navicular fracture   12/17/2019    ORIF right tibial plateau fracture Right 12/26/2019    ORTHOPEDIC SURGERY      percutaneous pinning of foot Left 12/26/2019    right knee external fixator Right 12/17/2019    right leg fasciotomy with wound vac placement  12/17/2019    SOFT TISSUE SURGERY  12/19    right leg fasciotomy    THYROIDECTOMY  05/08/2012    Procedure:THYROIDECTOMY; LEFT THYROID LOBECTOMY; Surgeon:STEPHANIA FERNÁNDEZ; Location: OR    UPPER GI ENDOSCOPY  02/2012    bx's okay, rec different PPI    ZZC APPENDECTOMY  Age 11    ZZC NONSPECIFIC PROCEDURE  1988    Right oophorectomy and dermoid cyst removal    ZZC NONSPECIFIC PROCEDURE  1997    Partial Left oophorectomy and dermoid cyst removal    ZZC NONSPECIFIC PROCEDURE   2002    L 4-5 herniated disc w/ laminectomy    [facility] NONSPECIFIC PROCEDURE  04/2003    Midcarpal Instability-Right wrist reconstruction    [facility] NONSPECIFIC PROCEDURE  1999    Left breast biopsy-benign       Current Outpatient Medications   Medication    Semaglutide-Weight Management (WEGOVY) 2.4 MG/0.75ML pen    Multiple Vitamin (THERAPEUTIC MULTIVITAMIN PO)    naproxen sodium (ANAPROX) 220 MG tablet    SYNTHROID 112 MCG tablet     No current facility-administered medications for this visit.       Allergies   Allergen Reactions    Hydrocodone-Acetaminophen Hives, Itching and Nausea and Vomiting    Oxycodone Hives, Itching, Nausea and Vomiting and Rash       Lab on 05/02/2023   Component Date Value Ref Range Status    WBC Count 05/02/2023 8.2  4.0 - 11.0 10e3/uL Final    RBC Count 05/02/2023 4.99  3.80 - 5.20 10e6/uL Final    Hemoglobin 05/02/2023 14.3  11.7 - 15.7 g/dL Final    Hematocrit 05/02/2023 43.5  35.0 - 47.0 % Final    MCV 05/02/2023 87  78 - 100 fL Final    MCH 05/02/2023 28.7  26.5 - 33.0 pg Final    MCHC 05/02/2023 32.9  31.5 - 36.5 g/dL Final    RDW 05/02/2023 12.4  10.0 - 15.0 % Final    Platelet Count 05/02/2023 196  150 - 450 10e3/uL Final    Sodium 05/02/2023 140  136 - 145 mmol/L Final    Potassium 05/02/2023 4.0  3.4 - 5.3 mmol/L Final    Chloride 05/02/2023 102  98 - 107 mmol/L Final    Carbon Dioxide (CO2) 05/02/2023 28  22 - 29 mmol/L Final    Anion Gap 05/02/2023 10  7 - 15 mmol/L Final    Urea Nitrogen 05/02/2023 18.2  6.0 - 20.0 mg/dL Final    Creatinine 05/02/2023 0.75  0.51 - 0.95 mg/dL Final    Calcium 05/02/2023 10.1 (H)  8.6 - 10.0 mg/dL Final    Glucose 05/02/2023 106 (H)  70 - 99 mg/dL Final    Alkaline Phosphatase 05/02/2023 87  35 - 104 U/L Final    AST 05/02/2023 16  10 - 35 U/L Final    ALT 05/02/2023 21  10 - 35 U/L Final    Protein Total 05/02/2023 7.8  6.4 - 8.3 g/dL Final    Albumin 05/02/2023 4.4  3.5 - 5.2 g/dL Final    Bilirubin Total 05/02/2023 0.4  <=1.2 mg/dL Final    GFR  "Estimate 05/02/2023 >90  >60 mL/min/1.73m2 Final    eGFR calculated using 2021 CKD-EPI equation.    Lipase 05/02/2023 29  13 - 60 U/L Final    Color Urine 05/02/2023 Yellow  Colorless, Straw, Light Yellow, Yellow Final    Appearance Urine 05/02/2023 Clear  Clear Final    Glucose Urine 05/02/2023 Negative  Negative mg/dL Final    Bilirubin Urine 05/02/2023 Negative  Negative Final    Ketones Urine 05/02/2023 Negative  Negative mg/dL Final    Specific Gravity Urine 05/02/2023 1.015  1.003 - 1.035 Final    Blood Urine 05/02/2023 Negative  Negative Final    pH Urine 05/02/2023 6.0  5.0 - 7.0 Final    Protein Albumin Urine 05/02/2023 Negative  Negative mg/dL Final    Urobilinogen Urine 05/02/2023 Normal  Normal, 2.0 mg/dL Final    Nitrite Urine 05/02/2023 Negative  Negative Final    Leukocyte Esterase Urine 05/02/2023 Negative  Negative Final    Mucus Urine 05/02/2023 Present (A)  None Seen /LPF Final    RBC Urine 05/02/2023 0  <=2 /HPF Final    WBC Urine 05/02/2023 1  <=5 /HPF Final    Hemoglobin A1C 05/02/2023 5.3  <5.7 % Final    Normal <5.7%   Prediabetes 5.7-6.4%    Diabetes 6.5% or higher     Note: Adopted from ADA consensus guidelines.       PHYSICAL EXAM:  Objective    Ht 1.626 m (5' 4\")   Wt 98.9 kg (218 lb)   LMP 09/10/2012 (Exact Date)   BMI 37.42 kg/m    Vitals - Patient Reported  Pain Score: No Pain (0)        Physical Exam   GENERAL: Healthy, alert and no distress  EYES: Eyes grossly normal to inspection.  No discharge or erythema, or obvious scleral/conjunctival abnormalities.  RESP: No audible wheeze, cough, or visible cyanosis.  No visible retractions or increased work of breathing.    SKIN: Visible skin clear. No significant rash, abnormal pigmentation or lesions.  NEURO: Cranial nerves grossly intact.  Mentation and speech appropriate for age.  PSYCH: Mentation appears normal, affect normal/bright, judgement and insight intact, normal speech and appearance well-groomed.    Sleeve Gastrectomy: Risks " and Side Effects    The complications or risks of surgery include but are not limited to: death, heart attack, infection in the surgical site (wound infection), abdomen (abscess), bladder (urinary tract infection), lungs (pneumonia), clots in legs (deep vein thrombosis) or lungs (pulmonary emboli),  injury to the bowels or other organs, bowel obstruction, hernia at the incision and gastrointestional bleeding.    More specific risks related to vertical sleeve gastrectomy were detailed at the bariatric informational seminar and include the following: leak at the vertical sleeve staple line, leak at the anastomoses,  nausea, vomiting, and dehydration for several months,  adhesions causing bowel obstruction, rapid weight loss causing a higher rate of gallstone formation during the first 6 months after surgery, decreased absorption of vitamins and protein because of the reduced stomach size, weight regain if inappropriate food intake occurs, stricture, injury to other organs, hernia,  and ulcers.       Side effects of bariatric surgery include but are not limited to: abdominal pain, cramping, bloating, constipation, nausea, vomiting, diarrhea, difficulty swallowing,  dehydration, hair loss, excess skin, protein, iron and vitamin deficiencies, heartburn, transfer of addictions, increased anxiety and worsening depression.       I emphasized exercise and activity behavior along with appropriate food choice as the main foundation for weight loss with surgery providing surgical reinforcement of the appropriate behavior set.        Review of general surgery weight loss process    1. Complete preoperative requirements, including weight loss.  Final weight check to confirm MANDATORY weight loss requirement must be documented on a clinic scale.    2. Discuss prior authorization with .    3. History and physical evaluation by PCP of PAC clinic within 30 days of surgery date, preoperative class, and weight check  (weigh-in visit) to be scheduled by patient.  Pre-anesthesia clinic for risk evaluation to be scheduled by anesthesia clinic.    4. We cannot guarantee that patient will qualify for surgery unless all preoperative requirements are met, prior authorization from primary insurance company is granted, and insurance changes do not occur.    5. It is possible for patients to regain all weight after weight loss surgery unless they follow guidelines prescribed by our bariatric center.    6. All patients with gastrointestinal complaints after weight loss surgery must have complaints conveyed to the bariatric team for appropriate treatment.    7. Vitamin deficiencies may develop post-bariatric surgery and annual laboratory testing should be performed.    8. Persistent nausea/vomiting after bariatric surgery entails risk of thiamine deficiency and should be treated early.  Vitamin B12 deficiency may develop, especially after gastric bypass surgery and must be recognized.        If you have any questions about our plans please don't hesitate to contact me.    Sincerely,    Charis Fuller NP      22 minutes spent by me on the date of the encounter doing chart review, history and exam, documentation and further activities per the note        Virtual Visit Details    Type of service:  Video Visit     Originating Location (pt. Location): Home    Distant Location (provider location):  Off-site  Platform used for Video Visit: Varsha

## 2023-08-21 ENCOUNTER — TELEPHONE (OUTPATIENT)
Dept: ENDOCRINOLOGY | Facility: CLINIC | Age: 58
End: 2023-08-21
Payer: COMMERCIAL

## 2023-08-21 NOTE — TELEPHONE ENCOUNTER
Called patient to inquire if she knows she needs to register with [facility] [employer] [facility] prior to surgery, she said she has already registered and is working with someone in the program.

## 2023-08-21 NOTE — TELEPHONE ENCOUNTER
Called [facility] to check if policy has coverage for bariatric surgery, it does. Policy will cover one procedure per lifetime while under this plan. Patient needs to register with [facility] prior to surgery [phone]. Policy requires a SAIDA. Call ref # [id number].

## 2023-08-27 ENCOUNTER — HEALTH MAINTENANCE LETTER (OUTPATIENT)
Age: 58
End: 2023-08-27

## 2023-09-10 DIAGNOSIS — E89.0 POSTSURGICAL HYPOTHYROIDISM: ICD-10-CM

## 2023-09-11 ENCOUNTER — TELEPHONE (OUTPATIENT)
Dept: FAMILY MEDICINE | Facility: CLINIC | Age: 58
End: 2023-09-11

## 2023-09-11 DIAGNOSIS — E89.0 POSTSURGICAL HYPOTHYROIDISM: ICD-10-CM

## 2023-09-11 DIAGNOSIS — Z13.1 SCREENING FOR DIABETES MELLITUS: ICD-10-CM

## 2023-09-11 DIAGNOSIS — E78.5 HYPERLIPIDEMIA LDL GOAL <130: Primary | ICD-10-CM

## 2023-09-11 RX ORDER — LEVOTHYROXINE SODIUM 112 UG/1
112 TABLET ORAL DAILY
Qty: 90 TABLET | Refills: 1 | OUTPATIENT
Start: 2023-09-11

## 2023-09-11 RX ORDER — LEVOTHYROXINE SODIUM 112 MCG
112 TABLET ORAL DAILY
Qty: 30 TABLET | Refills: 0 | Status: SHIPPED | OUTPATIENT
Start: 2023-09-11 | End: 2023-09-11

## 2023-09-11 RX ORDER — LEVOTHYROXINE SODIUM 112 MCG
112 TABLET ORAL DAILY
Qty: 90 TABLET | Refills: 0 | Status: SHIPPED | OUTPATIENT
Start: 2023-09-11 | End: 2023-09-12

## 2023-09-11 NOTE — TELEPHONE ENCOUNTER
Was given one month of synthroid 112mcg daily- needs fasting labs- assist with scheduling - also overdue for annual exam- assist with scheduling

## 2023-09-28 ENCOUNTER — ANCILLARY PROCEDURE (OUTPATIENT)
Dept: MAMMOGRAPHY | Facility: CLINIC | Age: 58
End: 2023-09-28
Attending: PHYSICIAN ASSISTANT
Payer: COMMERCIAL

## 2023-09-28 DIAGNOSIS — Z12.31 VISIT FOR SCREENING MAMMOGRAM: ICD-10-CM

## 2023-09-28 PROCEDURE — 77063 BREAST TOMOSYNTHESIS BI: CPT | Mod: GC | Performed by: RADIOLOGY

## 2023-09-28 PROCEDURE — 77067 SCR MAMMO BI INCL CAD: CPT | Mod: GC | Performed by: RADIOLOGY

## 2023-09-28 NOTE — RESULT ENCOUNTER NOTE
Dear Luz Maria  Your mammogram was normal  I recommend annual mammograms.  Please call or MyChart my office with any questions or concerns.   Tiana Macdonald, PAC

## 2023-10-24 ENCOUNTER — APPOINTMENT (OUTPATIENT)
Dept: LAB | Facility: CLINIC | Age: 58
End: 2023-10-24
Payer: COMMERCIAL

## 2023-10-30 ENCOUNTER — LAB (OUTPATIENT)
Dept: LAB | Facility: CLINIC | Age: 58
End: 2023-10-30
Payer: COMMERCIAL

## 2023-10-30 DIAGNOSIS — E89.0 POSTSURGICAL HYPOTHYROIDISM: ICD-10-CM

## 2023-10-30 DIAGNOSIS — E66.813 CLASS 3 SEVERE OBESITY WITH SERIOUS COMORBIDITY AND BODY MASS INDEX (BMI) OF 40.0 TO 44.9 IN ADULT, UNSPECIFIED OBESITY TYPE (H): ICD-10-CM

## 2023-10-30 DIAGNOSIS — Z13.1 SCREENING FOR DIABETES MELLITUS: ICD-10-CM

## 2023-10-30 DIAGNOSIS — E66.01 CLASS 3 SEVERE OBESITY WITH SERIOUS COMORBIDITY AND BODY MASS INDEX (BMI) OF 40.0 TO 44.9 IN ADULT, UNSPECIFIED OBESITY TYPE (H): ICD-10-CM

## 2023-10-30 DIAGNOSIS — E78.5 HYPERLIPIDEMIA LDL GOAL <130: ICD-10-CM

## 2023-10-30 LAB
ALBUMIN SERPL BCG-MCNC: 4.2 G/DL (ref 3.5–5.2)
ALP SERPL-CCNC: 85 U/L (ref 35–104)
ALT SERPL W P-5'-P-CCNC: 19 U/L (ref 0–50)
ANION GAP SERPL CALCULATED.3IONS-SCNC: 10 MMOL/L (ref 7–15)
AST SERPL W P-5'-P-CCNC: 25 U/L (ref 0–45)
BILIRUB SERPL-MCNC: 0.3 MG/DL
BUN SERPL-MCNC: 20.1 MG/DL (ref 6–20)
CALCIUM SERPL-MCNC: 9.4 MG/DL (ref 8.6–10)
CHLORIDE SERPL-SCNC: 102 MMOL/L (ref 98–107)
CHOLEST SERPL-MCNC: 242 MG/DL
CREAT SERPL-MCNC: 0.71 MG/DL (ref 0.51–0.95)
DEPRECATED HCO3 PLAS-SCNC: 26 MMOL/L (ref 22–29)
EGFRCR SERPLBLD CKD-EPI 2021: >90 ML/MIN/1.73M2
GLUCOSE SERPL-MCNC: 98 MG/DL (ref 70–99)
HBA1C MFR BLD: 5 % (ref 0–5.6)
HDLC SERPL-MCNC: 58 MG/DL
LDLC SERPL CALC-MCNC: 161 MG/DL
NONHDLC SERPL-MCNC: 184 MG/DL
POTASSIUM SERPL-SCNC: 4.9 MMOL/L (ref 3.4–5.3)
PROT SERPL-MCNC: 7.5 G/DL (ref 6.4–8.3)
SODIUM SERPL-SCNC: 138 MMOL/L (ref 135–145)
TRIGL SERPL-MCNC: 116 MG/DL
TSH SERPL DL<=0.005 MIU/L-ACNC: 2.64 UIU/ML (ref 0.3–4.2)

## 2023-10-30 PROCEDURE — 36415 COLL VENOUS BLD VENIPUNCTURE: CPT

## 2023-10-30 PROCEDURE — 80061 LIPID PANEL: CPT

## 2023-10-30 PROCEDURE — 80053 COMPREHEN METABOLIC PANEL: CPT

## 2023-10-30 PROCEDURE — 83036 HEMOGLOBIN GLYCOSYLATED A1C: CPT

## 2023-10-30 PROCEDURE — 84443 ASSAY THYROID STIM HORMONE: CPT

## 2023-10-30 NOTE — RESULT ENCOUNTER NOTE
Dear Luz Maria DUGAN- that cholesterol is high what are you doing differently?   Your electrolytes, blood sugar, kidney function and liver function were normal.     Your thyroid function was normal.   Let me know your thoughts!  Tiana Macdonald, PAC

## 2023-11-07 ENCOUNTER — TELEPHONE (OUTPATIENT)
Dept: FAMILY MEDICINE | Facility: CLINIC | Age: 58
End: 2023-11-07

## 2023-11-07 NOTE — TELEPHONE ENCOUNTER
Patient has not read MyChart results  Please call and advise      Dear Luz Maria DUGAN- that cholesterol is high what are you doing differently?  Your electrolytes, blood sugar, kidney function and liver function were normal.    Your thyroid function was normal.  Let me know your thoughts!  Tiana Madconald, PAC

## 2023-11-07 NOTE — TELEPHONE ENCOUNTER
Only needs to fast for 9 hours- likely genetic -we likely need to restart chol med-can discuss at upcoming appointment

## 2023-11-07 NOTE — TELEPHONE ENCOUNTER
RN called patient and LVM to call clinic at [phone].      If patient calls back, please relay provider message below.     Rebeka Narvaez RN

## 2023-11-07 NOTE — TELEPHONE ENCOUNTER
"Called and spoke with patient. Notified her of provider's note regarding labs below. Patient reported that she feels like her diet has been \"excellent\". Is currently on Wegovy and has lost 30 lbs so not sure why results were high. Reports that she was only fasting for 10 hours instead of 12 so not sure if that makes a difference. Noted that she has her physical coming up on November 29th.     Will update provider.     Rebeka Narvaez, JEANINEN, RN   [facility]    "

## 2023-11-08 NOTE — TELEPHONE ENCOUNTER
Called and spoke with patient. Notified her of provider's recommendations below. She verbalized understanding & had no further questions/concerns at this time.     Rebeka Narvaez, JEANINEN, RN   [facility]

## 2023-11-22 ASSESSMENT — ENCOUNTER SYMPTOMS
SORE THROAT: 0
PARESTHESIAS: 0
NERVOUS/ANXIOUS: 0
HEARTBURN: 0
ARTHRALGIAS: 1
DIZZINESS: 0
DIARRHEA: 0
HEADACHES: 0
BREAST MASS: 0
CONSTIPATION: 0
SHORTNESS OF BREATH: 0
PALPITATIONS: 0
MYALGIAS: 1
WEAKNESS: 0
COUGH: 0
CHILLS: 0
JOINT SWELLING: 0
DYSURIA: 0
FREQUENCY: 0
EYE PAIN: 0
NAUSEA: 0
HEMATOCHEZIA: 0
ABDOMINAL PAIN: 0
HEMATURIA: 0
FEVER: 0

## 2023-11-29 ENCOUNTER — OFFICE VISIT (OUTPATIENT)
Dept: FAMILY MEDICINE | Facility: CLINIC | Age: 58
End: 2023-11-29
Payer: COMMERCIAL

## 2023-11-29 VITALS
HEIGHT: 63 IN | OXYGEN SATURATION: 96 % | WEIGHT: 215.3 LBS | SYSTOLIC BLOOD PRESSURE: 136 MMHG | TEMPERATURE: 98.1 F | HEART RATE: 79 BPM | BODY MASS INDEX: 38.15 KG/M2 | DIASTOLIC BLOOD PRESSURE: 83 MMHG

## 2023-11-29 DIAGNOSIS — E89.0 POSTSURGICAL HYPOTHYROIDISM: ICD-10-CM

## 2023-11-29 DIAGNOSIS — E78.5 HYPERLIPIDEMIA LDL GOAL <130: ICD-10-CM

## 2023-11-29 DIAGNOSIS — E66.01 CLASS 2 SEVERE OBESITY WITH SERIOUS COMORBIDITY AND BODY MASS INDEX (BMI) OF 38.0 TO 38.9 IN ADULT, UNSPECIFIED OBESITY TYPE (H): ICD-10-CM

## 2023-11-29 DIAGNOSIS — Z00.00 ROUTINE GENERAL MEDICAL EXAMINATION AT A HEALTH CARE FACILITY: Primary | ICD-10-CM

## 2023-11-29 DIAGNOSIS — E66.812 CLASS 2 SEVERE OBESITY WITH SERIOUS COMORBIDITY AND BODY MASS INDEX (BMI) OF 38.0 TO 38.9 IN ADULT, UNSPECIFIED OBESITY TYPE (H): ICD-10-CM

## 2023-11-29 PROCEDURE — 99396 PREV VISIT EST AGE 40-64: CPT | Performed by: PHYSICIAN ASSISTANT

## 2023-11-29 PROCEDURE — 99213 OFFICE O/P EST LOW 20 MIN: CPT | Mod: 25 | Performed by: PHYSICIAN ASSISTANT

## 2023-11-29 RX ORDER — LEVOTHYROXINE SODIUM 112 UG/1
112 TABLET ORAL DAILY
Qty: 90 TABLET | Refills: 3 | Status: SHIPPED | OUTPATIENT
Start: 2023-11-29 | End: 2024-05-13

## 2023-11-29 RX ORDER — PRAVASTATIN SODIUM 10 MG
10 TABLET ORAL DAILY
Qty: 90 TABLET | Refills: 0 | Status: SHIPPED | OUTPATIENT
Start: 2023-11-29 | End: 2024-02-08

## 2023-11-29 RX ORDER — LEVOTHYROXINE SODIUM 112 MCG
112 TABLET ORAL DAILY
Qty: 90 TABLET | Refills: 3 | Status: SHIPPED | OUTPATIENT
Start: 2023-11-29 | End: 2023-11-29

## 2023-11-29 ASSESSMENT — ENCOUNTER SYMPTOMS
SHORTNESS OF BREATH: 0
CHILLS: 0
COUGH: 0
JOINT SWELLING: 0
CONSTIPATION: 0
PARESTHESIAS: 0
NERVOUS/ANXIOUS: 0
SORE THROAT: 0
NAUSEA: 0
MYALGIAS: 1
ARTHRALGIAS: 1
FEVER: 0
HEADACHES: 0
DYSURIA: 0
ABDOMINAL PAIN: 0
BREAST MASS: 0
HEMATURIA: 0
WEAKNESS: 0
DIARRHEA: 0
HEMATOCHEZIA: 0
DIZZINESS: 0
FREQUENCY: 0
EYE PAIN: 0
PALPITATIONS: 0
HEARTBURN: 0

## 2023-11-29 ASSESSMENT — PAIN SCALES - GENERAL: PAINLEVEL: NO PAIN (0)

## 2023-11-29 NOTE — PROGRESS NOTES
SUBJECTIVE:   Luz Maria is a 58 year old, presenting for the following:  Physical        11/29/2023     9:15 AM   Additional Questions   Roomed by edna HENRIQUEZ   Accompanied by self         11/29/2023     9:15 AM   Patient Reported Additional Medications   Patient reports taking the following new medications no       Healthy Habits:     Getting at least 3 servings of Calcium per day:  Yes    Bi-annual eye exam:  Yes    Dental care twice a year:  Yes    Sleep apnea or symptoms of sleep apnea:  Sleep apnea    Diet:  Low fat/cholesterol, Carbohydrate counting, Breakfast skipped and Other    Frequency of exercise:  4-5 days/week    Duration of exercise:  45-60 minutes    Taking medications regularly:  Yes    Medication side effects:  None    Additional concerns today:  No                  Have you ever done Advance Care Planning? (For example, a Health Directive, POLST, or a discussion with a medical provider or your loved ones about your wishes): No, advance care planning information given to patient to review.  Patient plans to discuss their wishes with loved ones or provider.      Social History     Tobacco Use    Smoking status: Never     Passive exposure: Never    Smokeless tobacco: Never   Substance Use Topics    Alcohol use: Yes     Comment: 1-3/week             11/22/2023     5:01 PM   Alcohol Use   Prescreen: >3 drinks/day or >7 drinks/week? No     Reviewed orders with patient.  Reviewed health maintenance and updated orders accordingly - Yes  BP Readings from Last 3 Encounters:   11/29/23 136/83   07/20/23 132/84   05/02/23 (!) 142/91    Wt Readings from Last 3 Encounters:   11/29/23 97.7 kg (215 lb 4.8 oz)   08/03/23 98.9 kg (218 lb)   07/25/23 99.8 kg (220 lb)                  Patient Active Problem List   Diagnosis    CARDIOVASCULAR SCREENING; LDL GOAL LESS THAN 160    Thyroid nodule    GERD (gastroesophageal reflux disease)    Family history of ischemic heart disease    Class 2 severe obesity with serious  comorbidity and body mass index (BMI) of 38.0 to 38.9 in adult, unspecified obesity type (H)    Hyperlipidemia LDL goal <130    Postsurgical hypothyroidism     Past Surgical History:   Procedure Laterality Date    BACK SURGERY  2001    lumbar discectomy    COLONOSCOPY  2018    COLONOSCOPY WITH CO2 INSUFFLATION N/A 06/20/2016    Procedure: COLONOSCOPY WITH CO2 INSUFFLATION;  Surgeon: Miguelito Erazo MD;  Location: [facility] OR    GYN SURGERY      HC REMOVE TONSILS/ADENOIDS,12+ Y/O  Age 14    left navicular fracture   12/17/2019    ORIF right tibial plateau fracture Right 12/26/2019    ORTHOPEDIC SURGERY      percutaneous pinning of foot Left 12/26/2019    right knee external fixator Right 12/17/2019    right leg fasciotomy with wound vac placement  12/17/2019    SOFT TISSUE SURGERY  12/19    right leg fasciotomy    THYROIDECTOMY  05/08/2012    Procedure:THYROIDECTOMY; LEFT THYROID LOBECTOMY; Surgeon:STEPHANIA FERNÁNDEZ; Location: OR    UPPER GI ENDOSCOPY  02/2012    bx's okay, rec different PPI    ZZC APPENDECTOMY  Age 11    ZZ NONSPECIFIC PROCEDURE  1988    Right oophorectomy and dermoid cyst removal    Z NONSPECIFIC PROCEDURE  1997    Partial Left oophorectomy and dermoid cyst removal    [facility] NONSPECIFIC PROCEDURE  2002    L 4-5 herniated disc w/ laminectomy    Z NONSPECIFIC PROCEDURE  04/2003    Midcarpal Instability-Right wrist reconstruction    Z NONSPECIFIC PROCEDURE  1999    Left breast biopsy-benign       Social History     Tobacco Use    Smoking status: Never     Passive exposure: Never    Smokeless tobacco: Never   Substance Use Topics    Alcohol use: Yes     Comment: 1-3/week     Family History   Problem Relation Age of Onset    Cancer Mother 89        Pancreatic    Hypertension Mother     Arthritis Mother     Lipids Mother     Osteoporosis Mother         on meds, did have metarsal fx from slip    Thyroid Disease Mother         Hypothyroid    Coronary Artery Disease Mother 89        cardiac stents    Other  Cancer Mother         pancreatic    C.A.D. Father 65        Quadruple bypass- age 65    Hypertension Father     Prostate Cancer Father     Blood Disease Father         Anti-cardiolipin syndrome, passed away from aneursym     Lipids Father     Thyroid Disease Father         Hypothyroid    Coronary Artery Disease Father         quad bypass    Hyperlipidemia Father     Diabetes Maternal Grandmother         Type 2    Diabetes Paternal Grandfather     Coronary Artery Disease Brother 54        Stent at 54 yrs    Other Cancer Brother         kidney cancer    Coronary Artery Disease Brother 49        quad bypass    Other Cancer Brother         kidney    Kidney Cancer Brother 49        smoker    Blood Disease Sister         Vonwillenbrand Syndrome    Thyroid Disease Sister     Cancer Sister 56         of pancreatic cancer w/in 3 mo of dx    Other Cancer Sister 59        pancreatic    Thyroid Disease Sister     Cerebrovascular Disease No family hx of     Breast Cancer No family hx of     Colon Cancer No family hx of     Depression No family hx of     Anxiety Disorder No family hx of     Mental Illness No family hx of     Substance Abuse No family hx of     Anesthesia Reaction No family hx of     Asthma No family hx of     Genetic Disorder No family hx of     Obesity No family hx of     Unknown/Adopted No family hx of          Current Outpatient Medications   Medication Sig Dispense Refill    levothyroxine (SYNTHROID/LEVOTHROID) 112 MCG tablet Take 1 tablet (112 mcg) by mouth daily 90 tablet 3    Multiple Vitamin (THERAPEUTIC MULTIVITAMIN PO)       naproxen sodium (ANAPROX) 220 MG tablet Take 220 mg by mouth 2 times daily as needed for moderate pain      pravastatin (PRAVACHOL) 10 MG tablet Take 1 tablet (10 mg) by mouth daily 90 tablet 0    Semaglutide-Weight Management (WEGOVY) 2.4 MG/0.75ML pen Inject 2.4 mg Subcutaneous every 7 days 3 mL 5       Breast Cancer Screenin/20/2022     4:05 PM   Breast CA Risk  Assessment ([facility]-7)   Do you have a family history of breast, colon, or ovarian cancer? No / Unknown         Mammogram Screening: Recommended mammography every 1-2 years with patient discussion and risk factor consideration  Pertinent mammograms are reviewed under the imaging tab.    History of abnormal Pap smear: NO - age 30-65 PAP every 5 years with negative HPV co-testing recommended      Latest Ref Rng & Units 7/20/2022     4:30 PM 2/19/2016    11:22 AM 2/19/2016    12:00 AM   PAP / HPV   PAP  Negative for Intraepithelial Lesion or Malignancy (NILM)      PAP (Historical)    NIL    HPV 16 DNA Negative Negative  Negative     HPV 18 DNA Negative Negative  Negative     Other HR HPV Negative Negative  Negative       Reviewed and updated as needed this visit by clinical staff   Tobacco  Allergies  Meds              Reviewed and updated as needed this visit by Provider   Tobacco   Meds   Med Hx  Surg Hx  Fam Hx  Soc Hx         Patient well known to me with history of hypothyroidism, hperlipidemia and obesity- followed by endocrinology on wegovy presents for annual exam. Has made dramatic lifestyle changes and losing weight.  4 days a week swim also has a  home gym with weights - significant lifestyle changes  Wt Readings from Last 4 Encounters:   11/29/23 97.7 kg (215 lb 4.8 oz)   08/03/23 98.9 kg (218 lb)   07/25/23 99.8 kg (220 lb)   07/20/23 100.2 kg (220 lb 12.8 oz)   Continues to have pain from previous injury unchanged.     Review of Systems   Constitutional:  Negative for chills and fever.   HENT:  Negative for congestion, ear pain, hearing loss and sore throat.    Eyes:  Negative for pain and visual disturbance.   Respiratory:  Negative for cough and shortness of breath.    Cardiovascular:  Negative for chest pain, palpitations and peripheral edema.   Gastrointestinal:  Negative for abdominal pain, constipation, diarrhea, heartburn, hematochezia and nausea.   Breasts:  Negative for tenderness, breast  "mass and discharge.   Genitourinary:  Negative for dysuria, frequency, genital sores, hematuria, pelvic pain, urgency, vaginal bleeding and vaginal discharge.   Musculoskeletal:  Positive for arthralgias and myalgias. Negative for joint swelling.   Skin:  Negative for rash.   Neurological:  Negative for dizziness, weakness, headaches and paresthesias.   Psychiatric/Behavioral:  Negative for mood changes. The patient is not nervous/anxious.           OBJECTIVE:   /83 (BP Location: Right arm, Patient Position: Sitting, Cuff Size: Adult Regular)   Pulse 79   Temp 98.1  F (36.7  C) (Oral)   Ht 1.603 m (5' 3.1\")   Wt 97.7 kg (215 lb 4.8 oz)   LMP 09/10/2012 (Exact Date)   SpO2 96%   BMI 38.02 kg/m    Physical Exam  GENERAL: alert, no distress, and obese  EYES: Eyes grossly normal to inspection, PERRL and conjunctivae and sclerae normal  HENT: ear canals and TM's normal, nose and mouth without ulcers or lesions  NECK: no adenopathy, no asymmetry, masses, or scars and thyroid normal to palpation  RESP: lungs clear to auscultation - no rales, rhonchi or wheezes  BREAST: normal without masses, tenderness or nipple discharge and no palpable axillary masses or adenopathy  CV: regular rate and rhythm, normal S1 S2, no S3 or S4, no murmur, click or rub, no peripheral edema and peripheral pulses strong  ABDOMEN: soft, nontender, no hepatosplenomegaly, no masses and bowel sounds normal  MS: no gross musculoskeletal defects noted, no edema  SKIN: no suspicious lesions or rashes  NEURO: Normal strength and tone, mentation intact and speech normal  PSYCH: mentation appears normal, affect normal/bright, judgement and insight intact, and appearance well groomed    Diagnostic Test Result        ASSESSMENT/PLAN:   (Z00.00) Routine general medical examination at a health care facility  (primary encounter diagnosis)  Comment: benign exam except obesity  Plan:     (E78.5) Hyperlipidemia LDL goal <130  Comment: crestor improved " "cholesterol but severe myalgias- trial of pravastatin- fasting labs in 8 weeks Plan: pravastatin (PRAVACHOL) 10 MG tablet            (E89.0) Postsurgical hypothyroidism  Comment: insurance won't cover brand name-euthyroid at current dose   Plan: levothyroxine (SYNTHROID/LEVOTHROID) 112 MCG         tablet, DISCONTINUED: SYNTHROID 112 MCG tablet            (E66.01,  Z68.38) Class 2 severe obesity with serious comorbidity and body mass index (BMI) of 38.0 to 38.9 in adult, unspecified obesity type (H)  Comment: has had significant weight loss- follows with endocrinology- on wegovy and great lifestyle changes   Plan:     Patient has been advised of split billing requirements and indicates understanding: Yes      COUNSELING:  Reviewed preventive health counseling, as reflected in patient instructions       Regular exercise       Healthy diet/nutrition       Vision screening       Colorectal Cancer Screening      BMI:   Estimated body mass index is 38.02 kg/m  as calculated from the following:    Height as of this encounter: 1.603 m (5' 3.1\").    Weight as of this encounter: 97.7 kg (215 lb 4.8 oz).   Weight management plan: Patient referred to endocrine and/or weight management specialty      She reports that she has never smoked. She has never been exposed to tobacco smoke. She has never used smokeless tobacco.          Tiana Macdonald PA-C  [facility]  "

## 2023-11-29 NOTE — PATIENT INSTRUCTIONS
Start pravastatin 10 mg daily and schedule fasting labs in approximately 8 weeks and make further adjustments at that time.  Message me with side effects or concerns    Patient Education  Preventive Health Recommendations  Female Ages 50 - 64    Yearly exam: See your health care provider every year in order to  Review health changes.   Discuss preventive care.    Review your medicines if your doctor has prescribed any.    Get a Pap test every three years (unless you have an abnormal result and your provider advises testing more often).  If you get Pap tests with HPV test, you only need to test every 5 years, unless you have an abnormal result.   You do not need a Pap test if your uterus was removed (hysterectomy) and you have not had cancer.  You should be tested each year for STDs (sexually transmitted diseases) if you're at risk.   Have a mammogram every 1 to 2 years.  Have a colonoscopy at age 45, or have a yearly FIT test (stool test). These exams screen for colon cancer.    Have a cholesterol test every 5 years, or more often if advised.  Have a diabetes test (fasting glucose) every three years. If you are at risk for diabetes, you should have this test more often.   If you are at risk for osteoporosis (brittle bone disease), think about having a bone density scan (DEXA).    Shots: Get a flu shot each year. Get a tetanus shot every 10 years.    Nutrition:   Eat at least 5 servings of fruits and vegetables each day.  Eat whole-grain bread, whole-wheat pasta and brown rice instead of white grains and rice.  Get adequate Calcium and Vitamin D.     Lifestyle  Exercise at least 150 minutes a week (30 minutes a day, 5 days a week). This will help you control your weight and prevent disease.  Limit alcohol to one drink per day.  No smoking.   Wear sunscreen to prevent skin cancer.   See your dentist every six months for an exam and cleaning.  See your eye doctor every 1 to 2 years.

## 2023-12-19 ENCOUNTER — VIRTUAL VISIT (OUTPATIENT)
Dept: ENDOCRINOLOGY | Facility: CLINIC | Age: 58
End: 2023-12-19
Payer: COMMERCIAL

## 2023-12-19 VITALS — BODY MASS INDEX: 38.09 KG/M2 | WEIGHT: 215 LBS | HEIGHT: 63 IN

## 2023-12-19 DIAGNOSIS — E66.812 CLASS 2 SEVERE OBESITY WITH SERIOUS COMORBIDITY AND BODY MASS INDEX (BMI) OF 38.0 TO 38.9 IN ADULT, UNSPECIFIED OBESITY TYPE (H): Primary | ICD-10-CM

## 2023-12-19 DIAGNOSIS — E66.01 CLASS 2 SEVERE OBESITY WITH SERIOUS COMORBIDITY AND BODY MASS INDEX (BMI) OF 38.0 TO 38.9 IN ADULT, UNSPECIFIED OBESITY TYPE (H): Primary | ICD-10-CM

## 2023-12-19 PROCEDURE — 99213 OFFICE O/P EST LOW 20 MIN: CPT | Mod: VID | Performed by: NURSE PRACTITIONER

## 2023-12-19 ASSESSMENT — PAIN SCALES - GENERAL: PAINLEVEL: NO PAIN (0)

## 2023-12-19 NOTE — NURSING NOTE
Is the patient currently in the state of [place]? YES    Visit mode:VIDEO    If the visit is dropped, the patient can be reconnected by: VIDEO VISIT: Text to cell phone:   Telephone Information:   Mobile [phone]       Will anyone else be joining the visit? NO  (If patient encounters technical issues they should call [phone])    How would you like to obtain your AVS? MyChart    Are changes needed to the allergy or medication list? Pt stated no changes to allergies and Pt stated no med changes    Reason for visit: Follow Up    Alondra MORA

## 2023-12-19 NOTE — ASSESSMENT & PLAN NOTE
Plateau with wegovy 2.4mg for the last couple months despite seeing body changes. Would like to switch to zepbound. Doing well with diet/ hydration.     -will try switching to zepbound (tirzepatide) 7.5mg x 4 weeks then 10mg   -consider body composition scale in clinic in the future to help with more insight around progress   -great work with diet changes and exercise!   -follow up 3 months

## 2023-12-19 NOTE — PROGRESS NOTES
Return Medical Weight Management Note     Thelma Uribe  MRN:  [id number]  :  1965  LUIS:  2023    Dear Tiana Macdonald PA-C,    I had the pleasure of seeing your patient Thelma Uribe. She is a 58 year old female who I am continuing to see for treatment of obesity related to:        2023    10:24 AM   --   I have the following health issues associated with obesity High Cholesterol    Sleep Apnea    Polycystic Ovarian Syndrome    Infertility    Fatty Liver    Osteoarthritis (joint disease)    Hypothyroidism       Assessment & Plan   Problem List Items Addressed This Visit          Digestive    Class 2 severe obesity with serious comorbidity and body mass index (BMI) of 38.0 to 38.9 in adult, unspecified obesity type (H) - Primary     Plateau with wegovy 2.4mg for the last couple months despite seeing body changes. Would like to switch to zepbound. Doing well with diet/ hydration.     -will try switching to zepbound (tirzepatide) 7.5mg x 4 weeks then 10mg   -consider body composition scale in clinic in the future to help with more insight around progress   -great work with diet changes and exercise!   -follow up 3 months          Relevant Medications    tirzepatide-Weight Management (ZEPBOUND) 7.5 MG/0.5ML prefilled pen    tirzepatide-Weight Management (ZEPBOUND) 10 MG/0.5ML prefilled pen (Start on 2024)          INTERVAL HISTORY:  New [facility] Dr. Graham 2023- qsyimia but had side effects, switched to [id number] bid. NBS 2023 BMI 40 with HLD, LETY, PCOS, s/p thyroidectomy. Switched to wegovy 2023. last seen 2023 - doing well on wegovy 2.4mg. Deciding between mwm and surgery. Has completed work up for surgery but still considering- not discussed today.       Anti-obesity medication history    Current:   wegovy 2.4mg   -still helpful with appetite   -minimal side effects - constipation if getting dehydrated     Recent diet changes:   More protein   Trying to see what  works for her body     Recent exercise/activity changes:   Working out a lot- swimming, strength training   Seeing changes in body - taking pictures     Vitamins/Labs: 10/2023    CURRENT WEIGHT:   215 lbs 0 oz    Initial Weight (lbs): 245 lbs  Last Visits Weight: 97.7 kg (215 lb 4.8 oz)  Cumulative weight loss (lbs): 30  Weight Loss Percentage: 12.24%        12/12/2023     5:36 PM   Changes and Difficulties   I have made the following changes to my diet since my last visit: smaller portions, more protein over carbs, low fat   With regards to my diet, I am still struggling with: getting enough protein and keeping in a calorie deficit   I have made the following changes to my activity/exercise since my last visit: I swim 4xweek for one hour, and I weight train 2xweek   With regards to my activity/exercise, I am still struggling with: walking is hard, but mostly because of my leg problems, that is why I swim.         MEDICATIONS:   Current Outpatient Medications   Medication Sig Dispense Refill    levothyroxine (SYNTHROID/LEVOTHROID) 112 MCG tablet Take 1 tablet (112 mcg) by mouth daily 90 tablet 3    Multiple Vitamin (THERAPEUTIC MULTIVITAMIN PO)       naproxen sodium (ANAPROX) 220 MG tablet Take 220 mg by mouth 2 times daily as needed for moderate pain      pravastatin (PRAVACHOL) 10 MG tablet Take 1 tablet (10 mg) by mouth daily 90 tablet 0    Semaglutide-Weight Management (WEGOVY) 2.4 MG/0.75ML pen Inject 2.4 mg Subcutaneous every 7 days 3 mL 5    [START ON 1/9/2024] tirzepatide-Weight Management (ZEPBOUND) 10 MG/0.5ML prefilled pen Inject 0.5 mLs (10 mg) Subcutaneous every 7 days 2 mL 1    tirzepatide-Weight Management (ZEPBOUND) 7.5 MG/0.5ML prefilled pen Inject 0.5 mLs (7.5 mg) Subcutaneous every 7 days 2 mL 0           12/12/2023     5:36 PM   Weight Loss Medication History Reviewed With Patient   Which weight loss medications are you currently taking on a regular basis? Wegovy   Are you having any side effects  from the weight loss medication that we have prescribed you? No       Lab on 10/30/2023   Component Date Value Ref Range Status    TSH 10/30/2023 2.64  0.30 - 4.20 uIU/mL Final    Cholesterol 10/30/2023 242 (H)  <200 mg/dL Final    Triglycerides 10/30/2023 116  <150 mg/dL Final    Direct Measure HDL 10/30/2023 58  >=50 mg/dL Final    LDL Cholesterol Calculated 10/30/2023 161 (H)  <=100 mg/dL Final    Non HDL Cholesterol 10/30/2023 184 (H)  <130 mg/dL Final    Sodium 10/30/2023 138  135 - 145 mmol/L Final    Reference intervals for this test were updated on 09/26/2023 to more accurately reflect our healthy population. There may be differences in the flagging of prior results with similar values performed with this method. Interpretation of those prior results can be made in the context of the updated reference intervals.     Potassium 10/30/2023 4.9  3.4 - 5.3 mmol/L Final    Carbon Dioxide (CO2) 10/30/2023 26  22 - 29 mmol/L Final    Anion Gap 10/30/2023 10  7 - 15 mmol/L Final    Urea Nitrogen 10/30/2023 20.1 (H)  6.0 - 20.0 mg/dL Final    Creatinine 10/30/2023 0.71  0.51 - 0.95 mg/dL Final    GFR Estimate 10/30/2023 >90  >60 mL/min/1.73m2 Final    Calcium 10/30/2023 9.4  8.6 - 10.0 mg/dL Final    Chloride 10/30/2023 102  98 - 107 mmol/L Final    Glucose 10/30/2023 98  70 - 99 mg/dL Final    Alkaline Phosphatase 10/30/2023 85  35 - 104 U/L Final    AST 10/30/2023 25  0 - 45 U/L Final    Reference intervals for this test were updated on 6/12/2023 to more accurately reflect our healthy population. There may be differences in the flagging of prior results with similar values performed with this method. Interpretation of those prior results can be made in the context of the updated reference intervals.    ALT 10/30/2023 19  0 - 50 U/L Final    Reference intervals for this test were updated on 6/12/2023 to more accurately reflect our healthy population. There may be differences in the flagging of prior results with  "similar values performed with this method. Interpretation of those prior results can be made in the context of the updated reference intervals.      Protein Total 10/30/2023 7.5  6.4 - 8.3 g/dL Final    Albumin 10/30/2023 4.2  3.5 - 5.2 g/dL Final    Bilirubin Total 10/30/2023 0.3  <=1.2 mg/dL Final    Hemoglobin A1C 10/30/2023 5.0  0.0 - 5.6 % Final    Normal <5.7%   Prediabetes 5.7-6.4%    Diabetes 6.5% or higher     Note: Adopted from ADA consensus guidelines.           9/9/2010     9:00 AM 5/28/2019     9:10 AM   JUANITA Score (Last Two)   JUANITA Raw Score 41 37   Activation Score 63.2 79.2   JUANITA Level 3 4         PHYSICAL EXAM:  Objective    Ht 1.603 m (5' 3.11\")   Wt 97.5 kg (215 lb)   LMP 09/10/2012 (Exact Date)   BMI 37.95 kg/m      Vitals - Patient Reported  Pain Score: No Pain (0)        GENERAL: Healthy, alert and no distress  EYES: Eyes grossly normal to inspection.  No discharge or erythema, or obvious scleral/conjunctival abnormalities.  RESP: No audible wheeze, cough, or visible cyanosis.  No visible retractions or increased work of breathing.    SKIN: Visible skin clear. No significant rash, abnormal pigmentation or lesions.  NEURO: Cranial nerves grossly intact.  Mentation and speech appropriate for age.  PSYCH: Mentation appears normal, affect normal/bright, judgement and insight intact, normal speech and appearance well-groomed.        Sincerely,    Charis Fuller NP      24 minutes spent by me on the date of the encounter doing chart review, history and exam, documentation and further activities per the note      Virtual Visit Details    Type of service:  Video Visit     Originating Location (pt. Location): Home    Distant Location (provider location):  Off-site  Platform used for Video Visit: Varsha    "

## 2023-12-19 NOTE — LETTER
2023       RE: Thelma Uribe  [address]  [facility] [address]     Dear Colleague,    Thank you for referring your patient, Thelma Uribe, to the [facility]NAGEMENT [facility]IC [place] [facility]. Please see a copy of my visit note below.      Return Medical Weight Management Note     Thelma Uribe  MRN:  [id number]  :  1965  LUIS:  2023    Dear Tiana Macdonald PA-C,    I had the pleasure of seeing your patient Thelma Uribe. She is a 58 year old female who I am continuing to see for treatment of obesity related to:        2023    10:24 AM   --   I have the following health issues associated with obesity High Cholesterol    Sleep Apnea    Polycystic Ovarian Syndrome    Infertility    Fatty Liver    Osteoarthritis (joint disease)    Hypothyroidism       Assessment & Plan  Problem List Items Addressed This Visit          Digestive    Class 2 severe obesity with serious comorbidity and body mass index (BMI) of 38.0 to 38.9 in adult, unspecified obesity type (H) - Primary     Plateau with wegovy 2.4mg for the last couple months despite seeing body changes. Would like to switch to zepbound. Doing well with diet/ hydration.     -will try switching to zepbound (tirzepatide) 7.5mg x 4 weeks then 10mg   -consider body composition scale in clinic in the future to help with more insight around progress   -great work with diet changes and exercise!   -follow up 3 months          Relevant Medications    tirzepatide-Weight Management (ZEPBOUND) 7.5 MG/0.5ML prefilled pen    tirzepatide-Weight Management (ZEPBOUND) 10 MG/0.5ML prefilled pen (Start on 2024)          INTERVAL HISTORY:  New [facility] Dr. Graham 2023- qsyimia but had side effects, switched to [id number] bid. NBS 2023 BMI 40 with HLD, LETY, PCOS, s/p thyroidectomy. Switched to wegovy 2023. last seen 2023 - doing well on wegovy  2.4mg. Deciding between mwm and surgery. Has completed work up for surgery but still considering- not discussed today.       Anti-obesity medication history    Current:   wegovy 2.4mg   -still helpful with appetite   -minimal side effects - constipation if getting dehydrated     Recent diet changes:   More protein   Trying to see what works for her body     Recent exercise/activity changes:   Working out a lot- swimming, strength training   Seeing changes in body - taking pictures     Vitamins/Labs: 10/2023    CURRENT WEIGHT:   215 lbs 0 oz    Initial Weight (lbs): 245 lbs  Last Visits Weight: 97.7 kg (215 lb 4.8 oz)  Cumulative weight loss (lbs): 30  Weight Loss Percentage: 12.24%        12/12/2023     5:36 PM   Changes and Difficulties   I have made the following changes to my diet since my last visit: smaller portions, more protein over carbs, low fat   With regards to my diet, I am still struggling with: getting enough protein and keeping in a calorie deficit   I have made the following changes to my activity/exercise since my last visit: I swim 4xweek for one hour, and I weight train 2xweek   With regards to my activity/exercise, I am still struggling with: walking is hard, but mostly because of my leg problems, that is why I swim.         MEDICATIONS:   Current Outpatient Medications   Medication Sig Dispense Refill    levothyroxine (SYNTHROID/LEVOTHROID) 112 MCG tablet Take 1 tablet (112 mcg) by mouth daily 90 tablet 3    Multiple Vitamin (THERAPEUTIC MULTIVITAMIN PO)       naproxen sodium (ANAPROX) 220 MG tablet Take 220 mg by mouth 2 times daily as needed for moderate pain      pravastatin (PRAVACHOL) 10 MG tablet Take 1 tablet (10 mg) by mouth daily 90 tablet 0    Semaglutide-Weight Management (WEGOVY) 2.4 MG/0.75ML pen Inject 2.4 mg Subcutaneous every 7 days 3 mL 5    [START ON 1/9/2024] tirzepatide-Weight Management (ZEPBOUND) 10 MG/0.5ML prefilled pen Inject 0.5 mLs (10 mg) Subcutaneous every 7 days 2  mL 1    tirzepatide-Weight Management (ZEPBOUND) 7.5 MG/0.5ML prefilled pen Inject 0.5 mLs (7.5 mg) Subcutaneous every 7 days 2 mL 0           12/12/2023     5:36 PM   Weight Loss Medication History Reviewed With Patient   Which weight loss medications are you currently taking on a regular basis? Wegovy   Are you having any side effects from the weight loss medication that we have prescribed you? No       Lab on 10/30/2023   Component Date Value Ref Range Status    TSH 10/30/2023 2.64  0.30 - 4.20 uIU/mL Final    Cholesterol 10/30/2023 242 (H)  <200 mg/dL Final    Triglycerides 10/30/2023 116  <150 mg/dL Final    Direct Measure HDL 10/30/2023 58  >=50 mg/dL Final    LDL Cholesterol Calculated 10/30/2023 161 (H)  <=100 mg/dL Final    Non HDL Cholesterol 10/30/2023 184 (H)  <130 mg/dL Final    Sodium 10/30/2023 138  135 - 145 mmol/L Final    Reference intervals for this test were updated on 09/26/2023 to more accurately reflect our healthy population. There may be differences in the flagging of prior results with similar values performed with this method. Interpretation of those prior results can be made in the context of the updated reference intervals.     Potassium 10/30/2023 4.9  3.4 - 5.3 mmol/L Final    Carbon Dioxide (CO2) 10/30/2023 26  22 - 29 mmol/L Final    Anion Gap 10/30/2023 10  7 - 15 mmol/L Final    Urea Nitrogen 10/30/2023 20.1 (H)  6.0 - 20.0 mg/dL Final    Creatinine 10/30/2023 0.71  0.51 - 0.95 mg/dL Final    GFR Estimate 10/30/2023 >90  >60 mL/min/1.73m2 Final    Calcium 10/30/2023 9.4  8.6 - 10.0 mg/dL Final    Chloride 10/30/2023 102  98 - 107 mmol/L Final    Glucose 10/30/2023 98  70 - 99 mg/dL Final    Alkaline Phosphatase 10/30/2023 85  35 - 104 U/L Final    AST 10/30/2023 25  0 - 45 U/L Final    Reference intervals for this test were updated on 6/12/2023 to more accurately reflect our healthy population. There may be differences in the flagging of prior results with similar values performed  "with this method. Interpretation of those prior results can be made in the context of the updated reference intervals.    ALT 10/30/2023 19  0 - 50 U/L Final    Reference intervals for this test were updated on 6/12/2023 to more accurately reflect our healthy population. There may be differences in the flagging of prior results with similar values performed with this method. Interpretation of those prior results can be made in the context of the updated reference intervals.      Protein Total 10/30/2023 7.5  6.4 - 8.3 g/dL Final    Albumin 10/30/2023 4.2  3.5 - 5.2 g/dL Final    Bilirubin Total 10/30/2023 0.3  <=1.2 mg/dL Final    Hemoglobin A1C 10/30/2023 5.0  0.0 - 5.6 % Final    Normal <5.7%   Prediabetes 5.7-6.4%    Diabetes 6.5% or higher     Note: Adopted from ADA consensus guidelines.           9/9/2010     9:00 AM 5/28/2019     9:10 AM   JUANITA Score (Last Two)   JUANITA Raw Score 41 37   Activation Score 63.2 79.2   JUANITA Level 3 4         PHYSICAL EXAM:  Objective   Ht 1.603 m (5' 3.11\")   Wt 97.5 kg (215 lb)   LMP 09/10/2012 (Exact Date)   BMI 37.95 kg/m      Vitals - Patient Reported  Pain Score: No Pain (0)        GENERAL: Healthy, alert and no distress  EYES: Eyes grossly normal to inspection.  No discharge or erythema, or obvious scleral/conjunctival abnormalities.  RESP: No audible wheeze, cough, or visible cyanosis.  No visible retractions or increased work of breathing.    SKIN: Visible skin clear. No significant rash, abnormal pigmentation or lesions.  NEURO: Cranial nerves grossly intact.  Mentation and speech appropriate for age.  PSYCH: Mentation appears normal, affect normal/bright, judgement and insight intact, normal speech and appearance well-groomed.        Sincerely,    Charis Fuller NP      24 minutes spent by me on the date of the encounter doing chart review, history and exam, documentation and further activities per the note      Virtual Visit Details    Type of service:  Video Visit "     Originating Location (pt. Location): Home    Distant Location (provider location):  Off-site  Platform used for Video Visit: Varsha

## 2023-12-19 NOTE — PATIENT INSTRUCTIONS
"Thank you for allowing us the privilege of caring for you. We hope we provided you with the excellent service you deserve.   Please let us know if there is anything else we can do for you so that we can be sure you are completely satisfied with your care experience.    To ensure the quality of our services you may be receiving a patient satisfaction survey from an independent patient satisfaction monitoring company.    The greatest compliment you can give is a \"Likely to Recommend\"    Your visit was with Charis Fuller NP today.    Instructions per today's visit:     Braden Uribe, it was great to visit with you today.  Here is a review of our visit.  If our clinic scheduler is not able to reach you please call [phone] to schedule your next appointments.    -will try switching to zepbound (tirzepatide) 7.5mg x 4 weeks then 10mg   -consider body composition scale in clinic in the future to help with more insight around progress   -great work with diet changes and exercise!   -follow up 3 months       Information about Video Visits with [employer]: video visit information  _________________________________________________________________________________________________________________________________________________________  If you are asked by your clinic team to have your blood pressure checked:  [facility] do offer several locations for blood pressure checks. Please follow the below link to schedule an appointment. Scheduling an appointment at the pharmacy for a blood pressure check is now preferred.    Appointment Plus (appointment-plusBright!Tax)  _________________________________________________________________________________________________________________________________________________________  Important contact and scheduling information:  Please call our contact center at [phone] to schedule your next appointments.  To find a lab location near you, please call [phone].  For " any nursing questions or concerns call Harika Betancourt LPN at [phone] or Filomena Quezada RN at [phone]  Please call during clinic hours Monday through Friday 8:00a - 4:00p if you have questions or you can contact us via Sosedit at anytime and we will reply during clinic hours.    Lab results will be communicated through My Chart or letter (if My Chart not used). Please call the clinic if you have not received communication after 1 week or if you have any questions.?  Clinic Fax: [phone]    _________________________________________________________________________________________________________________________________________________________  Meal Replacement Products:    Here is the link to our new e-store where you can purchase our meal replacement products    [facility] E-Store  [email or web address]    The one week starter kit is a great way to sample a variety of products and see what works for you.    If you want more information about the product go to: [employer]    If you are an employee or [facility] or [facility] please contact your care team for a 10% estore discount    Free Shipping for orders over $75     Benefits of meal replacements products:    Portion and calorie control  Improved nutrition  Structured eating  Simplified food choices  Avoid contact with trigger foods  _________________________________________________________________________________________________________________________________________________________  Interested in working with a health ?  Health coaches work with you to improve your overall health and wellbeing.  They look at the whole person, and may involve discussion of different areas of life, including, but not limited to the four pillars of health (sleep, exercise, nutrition, and stress management). Discuss with your care team if you would like to start working a health .  Health  Coaching-3 Pack: Schedule by calling [phone]    $99 for three health coaching visits    Visits may be done in person or via phone    Coaching is a partnership between the  and the client; Coaches do not prescribe or diagnose    Coaching helps inspire the client to reach his/her personal goals   _________________________________________________________________________________________________________________________________________________________  24 Week Healthy Lifestyle Plan:    Our mission in the 24-week Healthy Lifestyle Plan is to provide you with individualized care by giving you the tools, education and support you need to lose weight and maintain a healthy lifestyle. In your 24-week journey, you ll be supported by a dedicated weight loss team that includes registered dietitians, medical weight management providers, health coaches, and nurses -- all with special expertise in weight loss -- to help you every step of the way.     Monthly meetings with your registered dietician or medical weight management provider help to review your progress, update your care plan, and make any adjustments needed to ensure success. Between these visits, weekly and bi-weekly health  visits will help you focus on the four pillars of weight loss -- stress, sleep, nutrition, and exercise -- and how you can best adapt each to achieve sustainable weight loss results.    In addition, you will be given exclusive access to online wellbeing classes through [employer].  Your initial visit will be with a medical weight management provider who will help to understand your weight loss goals and ensure this program is the right fit for you. Please let our team know if you are interested in the 24 week plan by sending a message to your care team or calling [phone] to  schedule.  _________________________________________________________________________________________________________________________________________________________  __________  [facility] Get Moving Program  Our team of physical therapists is trained to help you understand and take control of your condition. They will perform a thorough evaluation to determine your ability for activity and develop a customized plan to fit your goals and physical ability.  Scheduling: Unsure if the Get Moving program is right for you? Discuss the program with your medical provider or diabetes educator. You can also call us at [phone] to ask questions or schedule an appointment.   [facility] Get Moving Program  ____________________________________________________________________________________________________________________________________________________________________________   [employer] Diabetes Prevention Program (DPP)  If you have prediabetes and Medicare please contact us via [employer] to learn more about the Diabetes Prevention Program (DPP)  Program Details:   [employer] offers the year-long Diabetes Prevention Program (DPP). The program helps you to make lifestyle changes that prevent or delay type 2 diabetes by supporting healthy eating, increased physical activity, stress reduction and use of coping skills.   On average, previous [facility] DPP cohorts have lost and maintained at least 5% of their starting weight throughout the program and averaged more than 150 minutes of physical activity per week.  Participants meet weekly for one-hour group sessions over sixteen weeks, every other week for the next 8 weeks, and monthly for the last six months.   A year-long maintenance program is also available for participants who complete the first year.   Location & Cost:   During the COVID-19 Public Health Emergency, the program is offered virtually. When in-person classes can resume, they  will be held at [facility].  For people with Medicare, the program is covered in full. A self-pay option will also be available for those with non-Medicare insurance plans.   ______________________________________________________________________________________________________________________________________________________________________________________________________________________________    To work with a Behavioral Health Psychologist:    Call to schedule:    Duarte [place] - [phone]  Compa [facility] - [phone]  Ayala Rivas - [phone]  Nani Sheppard - [phone]   Maisha Hogue PhD (cannot accept Medicare) [phone]        Thank you,   [facility] Comprehensive Weight Management Team

## 2023-12-21 ENCOUNTER — TELEPHONE (OUTPATIENT)
Dept: ENDOCRINOLOGY | Facility: CLINIC | Age: 58
End: 2023-12-21

## 2023-12-21 NOTE — LETTER
2024    To: Isis ([facility])    RE: Thelma Uribe  [address]  [place] [address]  : 1965  MRN: [id number]    To Whom It May Concern,    I am writing on behalf of my patient, Thelma Uribe to document the medical necessity of Zepbound for the treatment of obesity. This letter provides information about the patient's medical history and diagnosis and a statement summarizing my treatment rationale.     Summary of Patient History and Diagnosis  I had the pleasure of seeing your patient Thelma Uribe. She is a 58 year old female who I am continuing to see for treatment of obesity related to: High Cholesterol, Sleep Apnea, Polycystic Ovarian Syndrome, Infertility, Fatty Liver, Osteoarthritis (joint disease), Hypothyroidism.     Plateau with wegovy 2.4mg for the last couple months despite seeing body changes. Would like to switch to zepbound. Doing well with diet/ hydration.      Anti-obesity medication history     Current:   wegovy 2.4mg   -still helpful with appetite   -minimal side effects - constipation if getting dehydrated      Recent diet changes:   More protein   Trying to see what works for her body      Recent exercise/activity changes:   Working out a lot- swimming, strength training   Seeing changes in body - taking pictures      Vitamins/Labs: 10/2023     CURRENT WEIGHT:   215 lbs 0 oz     Initial Weight (lbs): 245 lbs  Last Visits Weight: 97.7 kg (215 lb 4.8 oz)  Cumulative weight loss (lbs): 30  Weight Loss Percentage: 12.24%         Treatment Rationale  Weight loss medication were previously covered. Patient has been on Wegovy weekly injection and doing well. She has lost 30lbs and 12% total body weight since starting the medication. We are switching to Zepbound, as she has recently had weight loss plateau. Stopping GLP1  therapy now would be detrimental on her progress and health, as she will likely gain weight back. We are requesting reconsideration in  covering this medication for continue success with weight loss.       Duration  Up to 12 months.      Summary  In summary, Zepbound is medically necessary for this patient s medical condition. Please call my office at [phone] if I can provide you with any additional information to approve my request. I look forward to receiving your timely response and approval of this request.     Sincerely,    Charis Fuller CNP

## 2023-12-21 NOTE — TELEPHONE ENCOUNTER
PA Initiation    Medication: ZEPBOUND 7.5 MG/0.5ML SC SOAJ  Insurance Company: [employer] ([facility]) - Phone [phone] Fax [phone]  Pharmacy Filling the Rx: [facility] - [place], [place] - [address], [address]  Filling Pharmacy Phone: [phone]  Filling Pharmacy Fax: [phone]  Start Date: 12/21/2023

## 2023-12-21 NOTE — TELEPHONE ENCOUNTER
PRIOR AUTHORIZATION DENIED    Medication: ZEPBOUND 7.5 MG/0.5ML SC SOAJ  Insurance Company: [employer] ([facility]) - Phone [phone] Fax [phone]  Denial Date: 12/21/2023  Denial Reason(s):     Appeal Information:       Patient Notified: XAVIER

## 2024-01-04 NOTE — TELEPHONE ENCOUNTER
Medication Appeal Initiation    Medication: ZEPBOUND 7.5 MG/0.5ML SC SOAJ  Appeal Start Date:  1/4/2024  Insurance Company: [employer]  Insurance Phone: [phone]  Insurance Fax: [phone]  Comments:

## 2024-01-04 NOTE — TELEPHONE ENCOUNTER
Medication Appeal Request    Please initiate an appeal for the requested medication: ZEPBOUND 7.5 MG/0.5ML SC SOAJ    Has a letter of medical necessity been completed in EPIC?   yes    Any additional lab values/information to include?     Would you like to include any research articles?               If yes please include the hyperlink(s) below or fax to    [phone] for Specialty/Retail               [phone] for Infusion/Clinic Administered.                Include the patients name and MRN on the fax cover sheet.

## 2024-01-31 ENCOUNTER — LAB (OUTPATIENT)
Dept: LAB | Facility: CLINIC | Age: 59
End: 2024-01-31
Payer: COMMERCIAL

## 2024-01-31 DIAGNOSIS — E78.5 HYPERLIPIDEMIA LDL GOAL <130: ICD-10-CM

## 2024-01-31 LAB
ALT SERPL W P-5'-P-CCNC: 28 U/L (ref 0–50)
AST SERPL W P-5'-P-CCNC: 22 U/L (ref 0–45)
CHOLEST SERPL-MCNC: 154 MG/DL
FASTING STATUS PATIENT QL REPORTED: YES
HDLC SERPL-MCNC: 54 MG/DL
LDLC SERPL CALC-MCNC: 73 MG/DL
NONHDLC SERPL-MCNC: 100 MG/DL
TRIGL SERPL-MCNC: 137 MG/DL

## 2024-01-31 PROCEDURE — 80061 LIPID PANEL: CPT

## 2024-01-31 PROCEDURE — 84460 ALANINE AMINO (ALT) (SGPT): CPT

## 2024-01-31 PROCEDURE — 36415 COLL VENOUS BLD VENIPUNCTURE: CPT

## 2024-01-31 PROCEDURE — 84450 TRANSFERASE (AST) (SGOT): CPT

## 2024-02-01 NOTE — RESULT ENCOUNTER NOTE
Dearodney Loera  Your labs look great!  How are you tolerating the pravastatin?  Let me know your thoughts.   Tiana Macdonald, PAC

## 2024-02-08 ENCOUNTER — MYC MEDICAL ADVICE (OUTPATIENT)
Dept: FAMILY MEDICINE | Facility: CLINIC | Age: 59
End: 2024-02-08
Payer: COMMERCIAL

## 2024-02-08 ENCOUNTER — MYC REFILL (OUTPATIENT)
Dept: ENDOCRINOLOGY | Facility: CLINIC | Age: 59
End: 2024-02-08
Payer: COMMERCIAL

## 2024-02-08 DIAGNOSIS — E66.01 CLASS 2 SEVERE OBESITY WITH SERIOUS COMORBIDITY AND BODY MASS INDEX (BMI) OF 38.0 TO 38.9 IN ADULT, UNSPECIFIED OBESITY TYPE (H): ICD-10-CM

## 2024-02-08 DIAGNOSIS — E66.812 CLASS 2 SEVERE OBESITY WITH SERIOUS COMORBIDITY AND BODY MASS INDEX (BMI) OF 38.0 TO 38.9 IN ADULT, UNSPECIFIED OBESITY TYPE (H): ICD-10-CM

## 2024-02-08 DIAGNOSIS — E78.5 HYPERLIPIDEMIA LDL GOAL <130: ICD-10-CM

## 2024-02-08 RX ORDER — PRAVASTATIN SODIUM 10 MG
10 TABLET ORAL DAILY
Qty: 90 TABLET | Refills: 0 | Status: SHIPPED | OUTPATIENT
Start: 2024-02-08 | End: 2024-05-13

## 2024-02-08 NOTE — TELEPHONE ENCOUNTER
Routing to provider to review and advise.     Rebeka Narvaez, JEANINEN, RN   [facility]

## 2024-02-09 NOTE — TELEPHONE ENCOUNTER
MEDICATION APPEAL APPROVED    Medication: ZEPBOUND 7.5 MG/0.5ML SC SOAJ  Authorization Effective Date: 1/4/2024  Authorization Expiration Date: 7/9/2024  Approved Dose/Quantity:    Reference #: Key: [id number]   Appeal Insurance Company: [facility]  Expected CoPay: $       CoPay Card Available: No  Financial Assistance Needed:    Filling Pharmacy: [facility] [address], [address]  Patient Notified: Y  Comments:

## 2024-02-16 NOTE — PATIENT INSTRUCTIONS
Recommendations from today's MTM visit:                                                    MTM (medication therapy management) is a service provided by a clinical pharmacist designed to help you get the most of out of your medicines.   Today we reviewed what your medicines are for, how to know if they are working, that your medicines are safe and how to make your medicine regimen as easy as possible.      1.  Continue current Qsymia dose at this time.    2.  Use MiraLAX 1 capful (17 g) once daily as needed for constipation.    3.  Patient to follow-up with sleep medicine regarding CPAP mask and machine.      Follow-up: Return in about 6 weeks (around 12/6/2021) for Medication Therapy Management Pharmacist Visit, (scheduled today).    It was great to speak with you today.  I value your experience and would be very thankful for your time with providing feedback on our clinic survey. You may receive a survey via email or text message in the next few days.       My Clinical Pharmacist's contact information:                                                      Please feel free to contact me with any questions or concerns you have.      Lauren Bloch, PharmD  Medication Therapy Management Pharmacist   [facility] [place]                 SBAR report given at bedside to ADAN Mullen    [x]  Patient's and proceduralist/s name    [x]  Procedures performed    [x]  Anesthesia provider    [x]  Code Status     [x]  Post-Procedure monitoring   PACU/Phase 1    [x]  Estimated blood loss     [x]  Medical/social history including allergies    [x]  Last vital signs    [x]  IV lines, neely, catheter care wipes completed    [x]  Status of surgical site, dressing(s)    [x]  Personal belongings     [x]  Family/Friends location  In OR Opertating    Using Notify Family in Epic   Yes

## 2024-03-16 ENCOUNTER — MYC REFILL (OUTPATIENT)
Dept: ENDOCRINOLOGY | Facility: CLINIC | Age: 59
End: 2024-03-16
Payer: COMMERCIAL

## 2024-03-16 DIAGNOSIS — E66.812 CLASS 2 SEVERE OBESITY WITH SERIOUS COMORBIDITY AND BODY MASS INDEX (BMI) OF 38.0 TO 38.9 IN ADULT, UNSPECIFIED OBESITY TYPE (H): ICD-10-CM

## 2024-03-16 DIAGNOSIS — E66.01 CLASS 2 SEVERE OBESITY WITH SERIOUS COMORBIDITY AND BODY MASS INDEX (BMI) OF 38.0 TO 38.9 IN ADULT, UNSPECIFIED OBESITY TYPE (H): ICD-10-CM

## 2024-03-20 ENCOUNTER — ALLIED HEALTH/NURSE VISIT (OUTPATIENT)
Dept: FAMILY MEDICINE | Facility: CLINIC | Age: 59
End: 2024-03-20
Payer: COMMERCIAL

## 2024-03-20 DIAGNOSIS — Z11.1 ENCOUNTER FOR TUBERCULIN SKIN TEST: Primary | ICD-10-CM

## 2024-03-20 DIAGNOSIS — Z23 ENCOUNTER FOR IMMUNIZATION: Primary | ICD-10-CM

## 2024-03-20 PROCEDURE — 90471 IMMUNIZATION ADMIN: CPT

## 2024-03-20 PROCEDURE — 99207 PR NO CHARGE NURSE ONLY: CPT

## 2024-03-20 PROCEDURE — 86580 TB INTRADERMAL TEST: CPT

## 2024-03-20 PROCEDURE — 90746 HEPB VACCINE 3 DOSE ADULT IM: CPT

## 2024-03-20 NOTE — PROGRESS NOTES
Prior to immunization administration, verified patients identity using patient s name and date of birth. Please see Immunization Activity for additional information.     Screening Questionnaire for Adult Immunization    Are you sick today?   No   Do you have allergies to medications, food, a vaccine component or latex?   No   Have you ever had a serious reaction after receiving a vaccination?   No   Do you have a long-term health problem with heart, lung, kidney, or metabolic disease (e.g., diabetes), asthma, a blood disorder, no spleen, complement component deficiency, a cochlear implant, or a spinal fluid leak?  Are you on long-term aspirin therapy?   No   Do you have cancer, leukemia, HIV/AIDS, or any other immune system problem?   No   Do you have a parent, brother, or sister with an immune system problem?   No   In the past 3 months, have you taken medications that affect  your immune system, such as prednisone, other steroids, or anticancer drugs; drugs for the treatment of rheumatoid arthritis, Crohn s disease, or psoriasis; or have you had radiation treatments?   No   Have you had a seizure, or a brain or other nervous system problem?   No   During the past year, have you received a transfusion of blood or blood    products, or been given immune (gamma) globulin or antiviral drug?   No   For women: Are you pregnant or is there a chance you could become       pregnant during the next month?   No   Have you received any vaccinations in the past 4 weeks?   No     Immunization questionnaire answers were all negative.    I have reviewed the following standing orders:   This patient is due and qualifies for the Hepatitis B vaccine.    Click here for Hepatitis B Standing Order    I have reviewed the vaccines inclusion and exclusion criteria; No concerns regarding eligibility.     Patient instructed to remain in clinic for 15 minutes afterwards, and to report any adverse reactions.     Screening performed by Faith  SONIYA Medley on 3/20/2024 at 3:13 PM.

## 2024-03-20 NOTE — PROGRESS NOTES
Patient is here today for a Mantoux (TST) test placement.    Is there a current order in the chart? Yes    Reason for Mantoux (TST) in patient's own words: TB test is for a new job.    Patient needs form signed? No - form not needed per patient.    Instructed patient to wait for 15 minutes post injection and to report any reactions immediately to staff.    Told patient to return to clinic in 48-72 hours to have Mantoux (TST) read.         Joao Moses RN  [facility]

## 2024-03-22 ENCOUNTER — ALLIED HEALTH/NURSE VISIT (OUTPATIENT)
Dept: FAMILY MEDICINE | Facility: CLINIC | Age: 59
End: 2024-03-22
Payer: COMMERCIAL

## 2024-03-22 ENCOUNTER — TELEPHONE (OUTPATIENT)
Dept: FAMILY MEDICINE | Facility: CLINIC | Age: 59
End: 2024-03-22

## 2024-03-22 DIAGNOSIS — Z11.1 SCREENING EXAMINATION FOR PULMONARY TUBERCULOSIS: Primary | ICD-10-CM

## 2024-03-22 LAB
PPDINDURATION: 0 MM (ref 0–4.99)
PPDREDNESS: PRESENT

## 2024-03-22 PROCEDURE — 99207 PR NO CHARGE NURSE ONLY: CPT

## 2024-03-22 NOTE — RESULT ENCOUNTER NOTE
Dear Luz Maria  Your TB test was fine  I hope this message finds you well.  Please call or MyChart my office with any questions or concerns.   Tiana Macdonald, PAC

## 2024-03-22 NOTE — PROGRESS NOTES
Patient is here today for a Mantoux (TST) test results.    Did patient return to clinic 48-72 hours from Mantoux (TST) placement: Yes -     PPD Induration   Date Value Ref Range Status   03/22/2024 0 0 - 4.99 mm Final     PPD Redness   Date Value Ref Range Status   03/22/2024 Present  Final           Induration Size? Induration <5mm - Enter results in Enter/Edit Activity. Route results to ordering provider.     Patient needs form signed? No    Patient reports having previously had the BCG Vaccine: No    Does patient need a two step? No    Gilma Caldwell RN

## 2024-03-22 NOTE — LETTER
[address] [address]  [phone]          3/22/2024          To Whom it May Concern:     Thelma Sparklekelli, 1965, has had a mantoux on 3/20/2024.      Mantoux result is NEGATIVE:  Lab Results   Component Value Date    PPDREDNESS Present 03/22/2024    PPDINDURATIO 0 03/22/2024         Please contact me for questions or concerns.        Sincerely,        [facility] RN

## 2024-03-22 NOTE — TELEPHONE ENCOUNTER
Upon chart review, documentation showed that patient had received Mantoux between 3:30 PM and 3:49 PM on 3/22/24, which test cannot be read prior to 48 hours.     Called patient regarding upcoming Mantoux read today scheduled at 2:10 PM. Notified her of the information above and that there won't be an RN at clinic after 3:30 PM today. Asked patient if she was okay with going to a different clinic to have Mantoux read due to needing test read 48 hours after it was given. Patient at first frustrated about having to go to a different clinic. Patient eventually understanding and agreed to be seen at [facility] instead. RN apologized to patient a few times that it was an inconvenience for patient. Patient was able to be rescheduled at [place] today at 3:30 PM to have nurse read mantoux there.     Patient had no further questions/concerns at this time.     After further chart review, RN found that mantoux was given at 3:10 PM on 3/22/24 so it will be greater than 48 hours once patient has test read at 3:30 PM today.    Rebeka Narvaez, JEANINEN, RN   [facility]

## 2024-04-19 ENCOUNTER — VIRTUAL VISIT (OUTPATIENT)
Dept: ENDOCRINOLOGY | Facility: CLINIC | Age: 59
End: 2024-04-19
Payer: COMMERCIAL

## 2024-04-19 VITALS — BODY MASS INDEX: 36.37 KG/M2 | HEIGHT: 64 IN | WEIGHT: 213 LBS

## 2024-04-19 DIAGNOSIS — E66.01 CLASS 2 SEVERE OBESITY WITH SERIOUS COMORBIDITY AND BODY MASS INDEX (BMI) OF 38.0 TO 38.9 IN ADULT, UNSPECIFIED OBESITY TYPE (H): Primary | ICD-10-CM

## 2024-04-19 DIAGNOSIS — E66.812 CLASS 2 SEVERE OBESITY WITH SERIOUS COMORBIDITY AND BODY MASS INDEX (BMI) OF 38.0 TO 38.9 IN ADULT, UNSPECIFIED OBESITY TYPE (H): Primary | ICD-10-CM

## 2024-04-19 DIAGNOSIS — E78.5 HYPERLIPIDEMIA LDL GOAL <130: ICD-10-CM

## 2024-04-19 PROCEDURE — 99213 OFFICE O/P EST LOW 20 MIN: CPT | Mod: 95 | Performed by: NURSE PRACTITIONER

## 2024-04-19 ASSESSMENT — PAIN SCALES - GENERAL: PAINLEVEL: NO PAIN (0)

## 2024-04-19 NOTE — LETTER
2024       RE: Thelma Uribe  [address]  [place] [address]     Dear Colleague,    Thank you for referring your patient, Thelma Uribe, to the [facility] [place] at [facility]. Please see a copy of my visit note below.    Virtual Visit Details    Type of service:  Video Visit     Originating Location (pt. Location): Home    Distant Location (provider location):  Off-site  Platform used for Video Visit: [facility] Medical Weight Management Note     Thelma Uribe  MRN:  [id number]  :  1965  LUIS:  2024    Dear Tiana Macdonald PA-C,    I had the pleasure of seeing your patient Themla Uribe. She is a 58 year old female who I am continuing to see for treatment of obesity related to:        2023    10:24 AM   --   I have the following health issues associated with obesity High Cholesterol    Sleep Apnea    Polycystic Ovarian Syndrome    Infertility    Fatty Liver    Osteoarthritis (joint disease)    Hypothyroidism       Assessment & Plan   Problem List Items Addressed This Visit          Digestive    Class 2 severe obesity with serious comorbidity and body mass index (BMI) of 38.0 to 38.9 in adult, unspecified obesity type (H) - Primary     Weight plateau since last seen. Hunger/ cravings well controlled. Exercising a lot and seeing body composition changes. Consuming about 2000 calories daily, likely needs to reduce this for additional weight loss. Discussed not reducing protein which reducing calories. No adverse side effects to zepbound. Feeling very beneficial with cravings, hunger, food noise. Would like to try to increase.       Lets try to increase to 10mg then if needing to 12.5   Continue to be mindful of protein   Likely need to reduce calories some to see weight loss  I love that you are seeing body composition changes!   Keep up the great work with  exercise!   Follow up 3 months   Consider follow up with dietitian          Relevant Medications    tirzepatide-Weight Management (ZEPBOUND) 10 MG/0.5ML prefilled pen       Endocrine    Hyperlipidemia LDL goal <130          INTERVAL HISTORY:  New MWM Dr. Graham 8/2023- qsyimia but had side effects, switched to [id number] bid. NBS 2/2023 BMI 40 with HLD, LETY, PCOS, s/p thyroidectomy. Switched to wegovy 2/2023 (BMI 40.9). Deciding between mwm and surgery. Has completed work up for surgery but still considering. Last seen 12/2023 going to try to switch to zepbound.     Seeing body changes     Anti-obesity medication history    Current:   zepbound 7.5mg   -constipation - colace and fiber      Past/Failed/contraindicated:   wegovy- plateau     Recent diet changes:   Some sugar cravings- sweets/ pastry- more stress related   Chewy candy - much less   Less alcohol cravings   Breakfast/ lunch/ dinner   Pushing protein/ veggies   Hard to get enough protein some day     Recent exercise/activity changes:   Swimming and weight training     Vitamins/Labs:   Normal cholesterol, AST/ALT 1/2024   cmp 10/2023     CURRENT WEIGHT:   213 lbs 0 oz    Initial Weight (lbs): 245 lbs  Last Visits Weight: 97.5 kg (215 lb)  Cumulative weight loss (lbs): 32  Weight Loss Percentage: 13.06%        12/12/2023     5:36 PM   Changes and Difficulties   I have made the following changes to my diet since my last visit: smaller portions, more protein over carbs, low fat   With regards to my diet, I am still struggling with: getting enough protein and keeping in a calorie deficit   I have made the following changes to my activity/exercise since my last visit: I swim 4xweek for one hour, and I weight train 2xweek   With regards to my activity/exercise, I am still struggling with: walking is hard, but mostly because of my leg problems, that is why I swim.         MEDICATIONS:   Current Outpatient Medications   Medication Sig Dispense Refill     "tirzepatide-Weight Management (ZEPBOUND) 10 MG/0.5ML prefilled pen Inject 0.5 mLs (10 mg) Subcutaneous every 7 days 2 mL 2    levothyroxine (SYNTHROID/LEVOTHROID) 112 MCG tablet Take 1 tablet (112 mcg) by mouth daily 90 tablet 3    Multiple Vitamin (THERAPEUTIC MULTIVITAMIN PO)       naproxen sodium (ANAPROX) 220 MG tablet Take 220 mg by mouth 2 times daily as needed for moderate pain      pravastatin (PRAVACHOL) 10 MG tablet Take 1 tablet (10 mg) by mouth daily 90 tablet 0    tirzepatide-Weight Management (ZEPBOUND) 7.5 MG/0.5ML prefilled pen Inject 0.5 mLs (7.5 mg) Subcutaneous every 7 days 2 mL 0           12/12/2023     5:36 PM   Weight Loss Medication History Reviewed With Patient   Which weight loss medications are you currently taking on a regular basis? Wegovy   Are you having any side effects from the weight loss medication that we have prescribed you? No           9/9/2010     9:00 AM 5/28/2019     9:10 AM   JUANITA Score (Last Two)   JUANITA Raw Score 41 37   Activation Score 63.2 79.2   JUANITA Level 3 4         PHYSICAL EXAM:  Objective    Ht 1.626 m (5' 4\")   Wt 96.6 kg (213 lb)   LMP 09/10/2012 (Exact Date)   BMI 36.56 kg/m      Vitals - Patient Reported  Pain Score: No Pain (0)      Vitals:  No vitals were obtained today due to virtual visit.    GENERAL: alert and no distress  EYES: Eyes grossly normal to inspection.  No discharge or erythema, or obvious scleral/conjunctival abnormalities.  RESP: No audible wheeze, cough, or visible cyanosis.    SKIN: Visible skin clear. No significant rash, abnormal pigmentation or lesions.  NEURO: Cranial nerves grossly intact.  Mentation and speech appropriate for age.  PSYCH: Appropriate affect, tone, and pace of words      Sincerely,    Charis Fuller NP      20 minutes spent by me on the date of the encounter doing chart review, history and exam, documentation and further activities per the note    "

## 2024-04-19 NOTE — NURSING NOTE
Is the patient currently in the state of [place]? YES    Visit mode:VIDEO    If the visit is dropped, the patient can be reconnected by: VIDEO VISIT: Text to cell phone:   Telephone Information:   Mobile [phone]       Will anyone else be joining the visit? NO  (If patient encounters technical issues they should call [phone]    How would you like to obtain your AVS? MyChart    Are changes needed to the allergy or medication list? No    Are refills needed on medications prescribed by this physician? NO    Reason for visit: RECHECK    Lucia MORA

## 2024-04-19 NOTE — ASSESSMENT & PLAN NOTE
Weight plateau since last seen. Hunger/ cravings well controlled. Exercising a lot and seeing body composition changes. Consuming about 2000 calories daily, likely needs to reduce this for additional weight loss. Discussed not reducing protein which reducing calories. No adverse side effects to zepbound. Feeling very beneficial with cravings, hunger, food noise. Would like to try to increase.       Lets try to increase to 10mg then if needing to 12.5   Continue to be mindful of protein   Likely need to reduce calories some to see weight loss  I love that you are seeing body composition changes!   Keep up the great work with exercise!   Follow up 3 months   Consider follow up with dietitian

## 2024-04-19 NOTE — Clinical Note
Patient is being told by pharmacy that she NEEDS a PA to be done for the 10mg dose. Can someone get that started please. Last time she asked the pharmacy liaison told us she didn't need one. But [facility] is telling her she NEEDS it. Thanks!

## 2024-04-19 NOTE — PATIENT INSTRUCTIONS
"Thank you for allowing us the privilege of caring for you. We hope we provided you with the excellent service you deserve.   Please let us know if there is anything else we can do for you so that we can be sure you are completely satisfied with your care experience.    To ensure the quality of our services you may be receiving a patient satisfaction survey from an independent patient satisfaction monitoring company.    The greatest compliment you can give is a \"Likely to Recommend\"    Your visit was with Charis Fuller NP today.    Instructions per today's visit:     Braden Uribe, it was great to visit with you today.  Here is a review of our visit.  If our clinic scheduler is not able to reach you please call [phone] to schedule your next appointments.    Lets try to increase to 10mg   Continue to be mindful of protein   Likely need to reduce calories some to see weight loss  I love that you are seeing body composition changes!   Keep up the great work with exercise!   Follow up 3 months   Could consider follow up with dietitian     Information about Video Visits with [facility]: video visit information  _________________________________________________________________________________________________________________________________________________________  If you are asked by your clinic team to have your blood pressure checked:  [facility] do offer several locations for blood pressure checks. Please follow the below link to schedule an appointment. Scheduling an appointment at the pharmacy for a blood pressure check is now preferred.    Appointment Plus (appointment-plus.Night Zookeeper)  _________________________________________________________________________________________________________________________________________________________  Important contact and scheduling information:  Please call our contact center at [phone] to schedule your next appointments.  To find a lab location near you, " please call [phone].  For any nursing questions or concerns call Harika Betancourt LPN at [phone] or Filomena Quezada RN at [phone]  Please call during clinic hours Monday through Friday 8:00a - 4:00p if you have questions or you can contact us via Acrisurehart at anytime and we will reply during clinic hours.    Lab results will be communicated through My Chart or letter (if My Chart not used). Please call the clinic if you have not received communication after 1 week or if you have any questions.?  Clinic Fax: [phone]    _________________________________________________________________________________________________________________________________________________________  Meal Replacement Products:    Here is the link to our new e-store where you can purchase our meal replacement products    [facility] E-Store  [email or web address]    The one week starter kit is a great way to sample a variety of products and see what works for you.    If you want more information about the product go to: Fresh Steps Meals  [email or web address]    If you are an employee or [facility] or [facility] please contact your care team for a 10% estore discount    Free Shipping for orders over $75     Benefits of meal replacements products:    Portion and calorie control  Improved nutrition  Structured eating  Simplified food choices  Avoid contact with trigger foods  _________________________________________________________________________________________________________________________________________________________  Interested in working with a health ?  Health coaches work with you to improve your overall health and wellbeing.  They look at the whole person, and may involve discussion of different areas of life, including, but not limited to the four pillars of health (sleep, exercise, nutrition, and stress management). Discuss with your care team if you would like to start working  a health .  Health Coaching-3 Pack: Schedule by calling [phone]    $99 for three health coaching visits    Visits may be done in person or via phone    Coaching is a partnership between the  and the client; Coaches do not prescribe or diagnose    Coaching helps inspire the client to reach his/her personal goals   _________________________________________________________________________________________________________________________________________________________  24 Week Healthy Lifestyle Plan:    Our mission in the 24-week Healthy Lifestyle Plan is to provide you with individualized care by giving you the tools, education and support you need to lose weight and maintain a healthy lifestyle. In your 24-week journey, you ll be supported by a dedicated weight loss team that includes registered dietitians, medical weight management providers, health coaches, and nurses -- all with special expertise in weight loss -- to help you every step of the way.     Monthly meetings with your registered dietician or medical weight management provider help to review your progress, update your care plan, and make any adjustments needed to ensure success. Between these visits, weekly and bi-weekly health  visits will help you focus on the four pillars of weight loss -- stress, sleep, nutrition, and exercise -- and how you can best adapt each to achieve sustainable weight loss results.    In addition, you will be given exclusive access to online wellbeing classes through [employer].  Your initial visit will be with a medical weight management provider who will help to understand your weight loss goals and ensure this program is the right fit for you. Please let our team know if you are interested in the 24 week plan by sending a message to your care team or calling [phone] to  schedule.  _________________________________________________________________________________________________________________________________________________________  __________  [facility] Get Moving Program  Our team of physical therapists is trained to help you understand and take control of your condition. They will perform a thorough evaluation to determine your ability for activity and develop a customized plan to fit your goals and physical ability.  Scheduling: Unsure if the Get Moving program is right for you? Discuss the program with your medical provider or diabetes educator. You can also call us at [phone] to ask questions or schedule an appointment.   [facility] Get Moving Program  ____________________________________________________________________________________________________________________________________________________________________________   [facility] Diabetes Prevention Program (DPP)  If you have prediabetes and Medicare please contact us via CellARidet to learn more about the Diabetes Prevention Program (DPP)  Program Details:   [facility] offers the year-long Diabetes Prevention Program (DPP). The program helps you to make lifestyle changes that prevent or delay type 2 diabetes by supporting healthy eating, increased physical activity, stress reduction and use of coping skills.   On average, previous [facility] DPP cohorts have lost and maintained at least 5% of their starting weight throughout the program and averaged more than 150 minutes of physical activity per week.  Participants meet weekly for one-hour group sessions over sixteen weeks, every other week for the next 8 weeks, and monthly for the last six months.   A year-long maintenance program is also available for participants who complete the first year.   Location & Cost:   During the COVID-19 Public Health Emergency, the program is offered virtually. When in-person classes can resume, they  will be held at [facility].  For people with Medicare, the program is covered in full. A self-pay option will also be available for those with non-Medicare insurance plans.   ______________________________________________________________________________________________________________________________________________________________________________________________________________________________    To work with a Behavioral Health Psychologist:    Call to schedule:    [place] [place] - [phone]  [facility] - [phone]  Ayala Rivas - [phone]  Nani Sheppard - [phone]   Maisha Hogue PhD (cannot accept Medicare) [phone]        Thank you,   [facility] Comprehensive Weight Management Team

## 2024-04-19 NOTE — PROGRESS NOTES
Virtual Visit Details    Type of service:  Video Visit     Originating Location (pt. Location): Home    Distant Location (provider location):  Off-site  Platform used for Video Visit: [facility] Medical Weight Management Note     Thelma Uribe  MRN:  [id number]  :  1965  LUIS:  2024    Dear Tiana Macdonald PA-C,    I had the pleasure of seeing your patient Thelma Uribe. She is a 58 year old female who I am continuing to see for treatment of obesity related to:        2023    10:24 AM   --   I have the following health issues associated with obesity High Cholesterol    Sleep Apnea    Polycystic Ovarian Syndrome    Infertility    Fatty Liver    Osteoarthritis (joint disease)    Hypothyroidism       Assessment & Plan   Problem List Items Addressed This Visit        Digestive    Class 2 severe obesity with serious comorbidity and body mass index (BMI) of 38.0 to 38.9 in adult, unspecified obesity type (H) - Primary     Weight plateau since last seen. Hunger/ cravings well controlled. Exercising a lot and seeing body composition changes. Consuming about 2000 calories daily, likely needs to reduce this for additional weight loss. Discussed not reducing protein which reducing calories. No adverse side effects to zepbound. Feeling very beneficial with cravings, hunger, food noise. Would like to try to increase.       Lets try to increase to 10mg then if needing to 12.5   Continue to be mindful of protein   Likely need to reduce calories some to see weight loss  I love that you are seeing body composition changes!   Keep up the great work with exercise!   Follow up 3 months   Consider follow up with dietitian          Relevant Medications    tirzepatide-Weight Management (ZEPBOUND) 10 MG/0.5ML prefilled pen       Endocrine    Hyperlipidemia LDL goal <130          INTERVAL HISTORY:  New MWTHEODORA Graham 2023- qsyimia but had side effects, switched to [id number] bid. NBS  2/2023 BMI 40 with HLD, LETY, PCOS, s/p thyroidectomy. Switched to wegovy 2/2023 (BMI 40.9). Deciding between mwm and surgery. Has completed work up for surgery but still considering. Last seen 12/2023 going to try to switch to zepbound.     Seeing body changes     Anti-obesity medication history    Current:   zepbound 7.5mg   -constipation - colace and fiber      Past/Failed/contraindicated:   wegovy- plateau     Recent diet changes:   Some sugar cravings- sweets/ pastry- more stress related   Chewy candy - much less   Less alcohol cravings   Breakfast/ lunch/ dinner   Pushing protein/ veggies   Hard to get enough protein some day     Recent exercise/activity changes:   Swimming and weight training     Vitamins/Labs:   Normal cholesterol, AST/ALT 1/2024   cmp 10/2023     CURRENT WEIGHT:   213 lbs 0 oz    Initial Weight (lbs): 245 lbs  Last Visits Weight: 97.5 kg (215 lb)  Cumulative weight loss (lbs): 32  Weight Loss Percentage: 13.06%        12/12/2023     5:36 PM   Changes and Difficulties   I have made the following changes to my diet since my last visit: smaller portions, more protein over carbs, low fat   With regards to my diet, I am still struggling with: getting enough protein and keeping in a calorie deficit   I have made the following changes to my activity/exercise since my last visit: I swim 4xweek for one hour, and I weight train 2xweek   With regards to my activity/exercise, I am still struggling with: walking is hard, but mostly because of my leg problems, that is why I swim.         MEDICATIONS:   Current Outpatient Medications   Medication Sig Dispense Refill     tirzepatide-Weight Management (ZEPBOUND) 10 MG/0.5ML prefilled pen Inject 0.5 mLs (10 mg) Subcutaneous every 7 days 2 mL 2     levothyroxine (SYNTHROID/LEVOTHROID) 112 MCG tablet Take 1 tablet (112 mcg) by mouth daily 90 tablet 3     Multiple Vitamin (THERAPEUTIC MULTIVITAMIN PO)        naproxen sodium (ANAPROX) 220 MG tablet Take 220 mg by  "mouth 2 times daily as needed for moderate pain       pravastatin (PRAVACHOL) 10 MG tablet Take 1 tablet (10 mg) by mouth daily 90 tablet 0     tirzepatide-Weight Management (ZEPBOUND) 7.5 MG/0.5ML prefilled pen Inject 0.5 mLs (7.5 mg) Subcutaneous every 7 days 2 mL 0           12/12/2023     5:36 PM   Weight Loss Medication History Reviewed With Patient   Which weight loss medications are you currently taking on a regular basis? Wegovy   Are you having any side effects from the weight loss medication that we have prescribed you? No           9/9/2010     9:00 AM 5/28/2019     9:10 AM   JUANITA Score (Last Two)   JUANITA Raw Score 41 37   Activation Score 63.2 79.2   JUANITA Level 3 4         PHYSICAL EXAM:  Objective    Ht 1.626 m (5' 4\")   Wt 96.6 kg (213 lb)   LMP 09/10/2012 (Exact Date)   BMI 36.56 kg/m      Vitals - Patient Reported  Pain Score: No Pain (0)      Vitals:  No vitals were obtained today due to virtual visit.    GENERAL: alert and no distress  EYES: Eyes grossly normal to inspection.  No discharge or erythema, or obvious scleral/conjunctival abnormalities.  RESP: No audible wheeze, cough, or visible cyanosis.    SKIN: Visible skin clear. No significant rash, abnormal pigmentation or lesions.  NEURO: Cranial nerves grossly intact.  Mentation and speech appropriate for age.  PSYCH: Appropriate affect, tone, and pace of words      Sincerely,    Charis Fuller NP      20 minutes spent by me on the date of the encounter doing chart review, history and exam, documentation and further activities per the note  "

## 2024-04-25 ENCOUNTER — TELEPHONE (OUTPATIENT)
Dept: ENDOCRINOLOGY | Facility: CLINIC | Age: 59
End: 2024-04-25
Payer: COMMERCIAL

## 2024-04-25 NOTE — TELEPHONE ENCOUNTER
Patient Contacted  and scheduled the following:    Appointment type: CHARLES SMALL   Provider: Charis Fuller NP  Return date: 09/13/2024  Specialty phone number: [phone]  Additional appointment(s) needed: no   Additonal Notes: no

## 2024-05-13 DIAGNOSIS — E78.5 HYPERLIPIDEMIA LDL GOAL <130: ICD-10-CM

## 2024-05-13 DIAGNOSIS — E89.0 POSTSURGICAL HYPOTHYROIDISM: ICD-10-CM

## 2024-05-13 RX ORDER — LEVOTHYROXINE SODIUM 112 UG/1
112 TABLET ORAL DAILY
Qty: 90 TABLET | Refills: 1 | Status: SHIPPED | OUTPATIENT
Start: 2024-05-13

## 2024-05-13 RX ORDER — LEVOTHYROXINE SODIUM 112 UG/1
112 TABLET ORAL DAILY
Qty: 90 TABLET | Refills: 3 | Status: CANCELLED | OUTPATIENT
Start: 2024-05-13

## 2024-05-13 RX ORDER — PRAVASTATIN SODIUM 10 MG
10 TABLET ORAL DAILY
Qty: 90 TABLET | Refills: 1 | Status: SHIPPED | OUTPATIENT
Start: 2024-05-13

## 2024-07-25 ENCOUNTER — TELEPHONE (OUTPATIENT)
Dept: FAMILY MEDICINE | Facility: CLINIC | Age: 59
End: 2024-07-25
Payer: COMMERCIAL

## 2024-09-13 ENCOUNTER — VIRTUAL VISIT (OUTPATIENT)
Dept: ENDOCRINOLOGY | Facility: CLINIC | Age: 59
End: 2024-09-13
Payer: COMMERCIAL

## 2024-09-13 VITALS — BODY MASS INDEX: 35.17 KG/M2 | HEIGHT: 64 IN | WEIGHT: 206 LBS

## 2024-09-13 DIAGNOSIS — E66.812 CLASS 2 SEVERE OBESITY WITH SERIOUS COMORBIDITY AND BODY MASS INDEX (BMI) OF 38.0 TO 38.9 IN ADULT, UNSPECIFIED OBESITY TYPE (H): ICD-10-CM

## 2024-09-13 DIAGNOSIS — E66.01 CLASS 2 SEVERE OBESITY WITH SERIOUS COMORBIDITY AND BODY MASS INDEX (BMI) OF 38.0 TO 38.9 IN ADULT, UNSPECIFIED OBESITY TYPE (H): ICD-10-CM

## 2024-09-13 PROCEDURE — 99213 OFFICE O/P EST LOW 20 MIN: CPT | Mod: 95 | Performed by: NURSE PRACTITIONER

## 2024-09-13 PROCEDURE — G2211 COMPLEX E/M VISIT ADD ON: HCPCS | Mod: 95 | Performed by: NURSE PRACTITIONER

## 2024-09-13 ASSESSMENT — PAIN SCALES - GENERAL: PAINLEVEL: NO PAIN (0)

## 2024-09-13 NOTE — NURSING NOTE
Current patient location work     Is the patient currently in the state of [place]? YES    Visit mode:VIDEO    If the visit is dropped, the patient can be reconnected by: VIDEO VISIT: Text to cell phone:   Telephone Information:   Mobile [phone]       Will anyone else be joining the visit? NO  (If patient encounters technical issues they should call [phone] :[phone])    How would you like to obtain your AVS? MyChart    Are changes needed to the allergy or medication list? Pt stated no changes to allergies and Pt stated no med changes    Are refills needed on medications prescribed by this physician? YES    Rooming Documentation:  Questionnaire(s) completed    Reason for visit: RECHECK    Wt other than 24 hrs:    Pain more than one location:  no  Luly MORA

## 2024-09-13 NOTE — PATIENT INSTRUCTIONS
Continue zepbound 10mg   Keep up the great work   Continue to be mindful about hydration   Follow up 4 months

## 2024-09-13 NOTE — PROGRESS NOTES
Return Medical Weight Management Note     Thelma Uribe  MRN:  [id number]  :  1965  LUIS:  2024    Dear Tiana Macdonald PA-C,    I had the pleasure of seeing your patient Thelma Uribe. She is a 58 year old female who I am continuing to see for treatment of obesity related to:        2023    10:24 AM   --   I have the following health issues associated with obesity High Cholesterol    Sleep Apnea    Polycystic Ovarian Syndrome    Infertility    Fatty Liver    Osteoarthritis (joint disease)    Hypothyroidism       Assessment & Plan   Problem List Items Addressed This Visit          Digestive    Class 2 severe obesity with serious comorbidity and body mass index (BMI) of 38.0 to 38.9 in adult, unspecified obesity type (H)     Zepbound 12.5mg caused a lot of nausea. Nausea resovled going back to 10mg. No adverse side effects. Hunger and craving well controlled. Steady weight loss. Would like to continue current plan. Needing to be mindful about hydration now that she is back at work.     Continue zepbound 10mg   Keep up the great work   Continue to be mindful about hydration   Follow up 4 months          Relevant Medications    tirzepatide-Weight Management (ZEPBOUND) 10 MG/0.5ML prefilled pen          INTERVAL HISTORY:  New MWM Dr. Graham 2023- qsyimia but had side effects, switched to [id number] bid. NBS 2023 BMI 40 with HLD, LETY, PCOS, s/p thyroidectomy. Switched to wegovy 2023 (BMI 40.9). Deciding between mwm and surgery. Has completed work up for surgery but still considering. Last seen 2023 going to try to switch to zepbound.     Feels good overall     Anti-obesity medication history    Current:   Zepbound 10mg  -constipation but manageable - using colace 2 daily     12.5mg caused nausea     Past/Failed/contraindicated:   wegovy- plateau     Recent diet changes:   Reduced caloric intake to around 1800 range daily   More protein   Less sweets   Some cravings in  the evenings   Working on increasing fluids     Recent exercise/activity changes:   Swimming 2 days a week instead of 5 days a week     Recent stressors:   Back to work 4 days a week - from [employer]     Vitamins/Labs: 5/2023     CURRENT WEIGHT:   206 lbs 0 oz    Initial Weight (lbs): 245 lbs  Last Visits Weight: 96.6 kg (213 lb)  Cumulative weight loss (lbs): 39  Weight Loss Percentage: 15.92%        9/8/2024    11:32 AM   Changes and Difficulties   I have made the following changes to my diet since my last visit: Higher protein less calories   With regards to my diet, I am still struggling with: Hydration   I have made the following changes to my activity/exercise since my last visit: Went back to work less frequent swimming   With regards to my activity/exercise, I am still struggling with: Stable         MEDICATIONS:   Current Outpatient Medications   Medication Sig Dispense Refill    tirzepatide-Weight Management (ZEPBOUND) 10 MG/0.5ML prefilled pen Inject 0.5 mLs (10 mg) subcutaneously every 7 days. 2 mL 4    levothyroxine (SYNTHROID/LEVOTHROID) 112 MCG tablet Take 1 tablet (112 mcg) by mouth daily 90 tablet 1    Multiple Vitamin (THERAPEUTIC MULTIVITAMIN PO)       naproxen sodium (ANAPROX) 220 MG tablet Take 220 mg by mouth 2 times daily as needed for moderate pain      pravastatin (PRAVACHOL) 10 MG tablet Take 1 tablet (10 mg) by mouth daily 90 tablet 1           9/8/2024    11:32 AM   Weight Loss Medication History Reviewed With Patient   Which weight loss medications are you currently taking on a regular basis? Wegovy   Are you having any side effects from the weight loss medication that we have prescribed you? No       Allied Health/Nurse Visit on 03/20/2024   Component Date Value Ref Range Status    PPD Induration 03/22/2024 0  0 - 4.99 mm Final    PPD Redness 03/22/2024 Present   Final           9/9/2010     9:00 AM 5/28/2019     9:10 AM   JUANITA Score (Last Two)   JUANITA Raw Score 41 37   Activation Score  "63.2 79.2   JUANITA Level 3 4         PHYSICAL EXAM:  Objective    Ht 1.626 m (5' 4\")   Wt 93.4 kg (206 lb)   LMP 09/10/2012 (Exact Date)   BMI 35.36 kg/m      Vitals - Patient Reported  Pain Score: No Pain (0)        GENERAL: alert and no distress  EYES: Eyes grossly normal to inspection.  No discharge or erythema, or obvious scleral/conjunctival abnormalities.  RESP: No audible wheeze, cough, or visible cyanosis.    SKIN: Visible skin clear. No significant rash, abnormal pigmentation or lesions.  NEURO: Cranial nerves grossly intact.  Mentation and speech appropriate for age.  PSYCH: Appropriate affect, tone, and pace of words        Sincerely,    Charis Fuller NP      10 minutes spent by me on the date of the encounter doing chart review, history and exam, documentation and further activities per the note    The longitudinal plan of care for the diagnosis(es)/condition(s) as documented were addressed during this visit. Due to the added complexity in care, I will continue to support Luz Maria in the subsequent management and with ongoing continuity of care.    Virtual Visit Details    Type of service:  Video Visit     Originating Location (pt. Location): Home    Distant Location (provider location):  Off-site  Platform used for Video Visit: Varsha"

## 2024-09-13 NOTE — ASSESSMENT & PLAN NOTE
Zepbound 12.5mg caused a lot of nausea. Nausea resovled going back to 10mg. No adverse side effects. Hunger and craving well controlled. Steady weight loss. Would like to continue current plan. Needing to be mindful about hydration now that she is back at work.     Continue zepbound 10mg   Keep up the great work   Continue to be mindful about hydration   Follow up 4 months

## 2024-09-13 NOTE — LETTER
2024       RE: Thelma Uribe  [address]  [place] [facility] [address]     Dear Colleague,    Thank you for referring your patient, Thelma Uribe, to the [facility]NA[facility] [place] [facility]. Please see a copy of my visit note below.      Return Medical Weight Management Note     Thelma Uribe  MRN:  [id number]  :  1965  LUIS:  2024    Dear Tiana Macdonald PA-C,    I had the pleasure of seeing your patient Thelma Uribe. She is a 58 year old female who I am continuing to see for treatment of obesity related to:        2023    10:24 AM   --   I have the following health issues associated with obesity High Cholesterol    Sleep Apnea    Polycystic Ovarian Syndrome    Infertility    Fatty Liver    Osteoarthritis (joint disease)    Hypothyroidism       Assessment & Plan  Problem List Items Addressed This Visit          Digestive    Class 2 severe obesity with serious comorbidity and body mass index (BMI) of 38.0 to 38.9 in adult, unspecified obesity type (H)     Zepbound 12.5mg caused a lot of nausea. Nausea resovled going back to 10mg. No adverse side effects. Hunger and craving well controlled. Steady weight loss. Would like to continue current plan. Needing to be mindful about hydration now that she is back at work.     Continue zepbound 10mg   Keep up the great work   Continue to be mindful about hydration   Follow up 4 months          Relevant Medications    tirzepatide-Weight Management (ZEPBOUND) 10 MG/0.5ML prefilled pen          INTERVAL HISTORY:  New MWM Dr. Graham 2023- qsyimia but had side effects, switched to [id number] bid. NBS 2023 BMI 40 with HLD, LETY, PCOS, s/p thyroidectomy. Switched to wegovy 2023 (BMI 40.9). Deciding between mwm and surgery. Has completed work up for surgery but still considering. Last seen 2023 going to try to switch to zepbound.      Feels good overall     Anti-obesity medication history    Current:   Zepbound 10mg  -constipation but manageable - using colace 2 daily     12.5mg caused nausea     Past/Failed/contraindicated:   wegovy- plateau     Recent diet changes:   Reduced caloric intake to around 1800 range daily   More protein   Less sweets   Some cravings in the evenings   Working on increasing fluids     Recent exercise/activity changes:   Swimming 2 days a week instead of 5 days a week     Recent stressors:   Back to work 4 days a week - from [employer]     Vitamins/Labs: 5/2023     CURRENT WEIGHT:   206 lbs 0 oz    Initial Weight (lbs): 245 lbs  Last Visits Weight: 96.6 kg (213 lb)  Cumulative weight loss (lbs): 39  Weight Loss Percentage: 15.92%        9/8/2024    11:32 AM   Changes and Difficulties   I have made the following changes to my diet since my last visit: Higher protein less calories   With regards to my diet, I am still struggling with: Hydration   I have made the following changes to my activity/exercise since my last visit: Went back to work less frequent swimming   With regards to my activity/exercise, I am still struggling with: Stable         MEDICATIONS:   Current Outpatient Medications   Medication Sig Dispense Refill     tirzepatide-Weight Management (ZEPBOUND) 10 MG/0.5ML prefilled pen Inject 0.5 mLs (10 mg) subcutaneously every 7 days. 2 mL 4     levothyroxine (SYNTHROID/LEVOTHROID) 112 MCG tablet Take 1 tablet (112 mcg) by mouth daily 90 tablet 1     Multiple Vitamin (THERAPEUTIC MULTIVITAMIN PO)        naproxen sodium (ANAPROX) 220 MG tablet Take 220 mg by mouth 2 times daily as needed for moderate pain       pravastatin (PRAVACHOL) 10 MG tablet Take 1 tablet (10 mg) by mouth daily 90 tablet 1           9/8/2024    11:32 AM   Weight Loss Medication History Reviewed With Patient   Which weight loss medications are you currently taking on a regular basis? Wegovy   Are you having any side effects from the  "weight loss medication that we have prescribed you? No       Allied Health/Nurse Visit on 03/20/2024   Component Date Value Ref Range Status     PPD Induration 03/22/2024 0  0 - 4.99 mm Final     PPD Redness 03/22/2024 Present   Final           9/9/2010     9:00 AM 5/28/2019     9:10 AM   JUANITA Score (Last Two)   JUANITA Raw Score 41 37   Activation Score 63.2 79.2   JUANITA Level 3 4         PHYSICAL EXAM:  Objective   Ht 1.626 m (5' 4\")   Wt 93.4 kg (206 lb)   LMP 09/10/2012 (Exact Date)   BMI 35.36 kg/m      Vitals - Patient Reported  Pain Score: No Pain (0)        GENERAL: alert and no distress  EYES: Eyes grossly normal to inspection.  No discharge or erythema, or obvious scleral/conjunctival abnormalities.  RESP: No audible wheeze, cough, or visible cyanosis.    SKIN: Visible skin clear. No significant rash, abnormal pigmentation or lesions.  NEURO: Cranial nerves grossly intact.  Mentation and speech appropriate for age.  PSYCH: Appropriate affect, tone, and pace of words        Sincerely,    Charis Fuller NP      10 minutes spent by me on the date of the encounter doing chart review, history and exam, documentation and further activities per the note    The longitudinal plan of care for the diagnosis(es)/condition(s) as documented were addressed during this visit. Due to the added complexity in care, I will continue to support Luz Maria in the subsequent management and with ongoing continuity of care.    Virtual Visit Details    Type of service:  Video Visit     Originating Location (pt. Location): Home    Distant Location (provider location):  Off-site  Platform used for Video Visit: AmWell      Again, thank you for allowing me to participate in the care of your patient.      Sincerely,    Charis Fuller NP    "

## 2024-10-07 ENCOUNTER — TELEPHONE (OUTPATIENT)
Dept: ENDOCRINOLOGY | Facility: CLINIC | Age: 59
End: 2024-10-07
Payer: COMMERCIAL

## 2024-10-07 NOTE — TELEPHONE ENCOUNTER
PA Initiation    Medication: ZEPBOUND 10 MG/0.5ML SC SOAJ  Insurance Company: [employer] ([facility]) - Phone [phone] Fax [phone]  Pharmacy Filling the Rx: [facility] [place], [place] - [address], [address]  Filling Pharmacy Phone:    Filling Pharmacy Fax:    Start Date: 10/7/2024       Thelma Uribe (Key: [id number])

## 2024-10-09 DIAGNOSIS — E78.5 HYPERLIPIDEMIA LDL GOAL <130: ICD-10-CM

## 2024-10-09 RX ORDER — PRAVASTATIN SODIUM 10 MG
10 TABLET ORAL DAILY
Qty: 90 TABLET | Refills: 0 | Status: SHIPPED | OUTPATIENT
Start: 2024-10-09 | End: 2024-11-13

## 2024-10-10 NOTE — TELEPHONE ENCOUNTER
Prior Authorization Approval    Medication: ZEPBOUND 10 MG/0.5ML SC SOAJ  Authorization Effective Date: 10/7/2024  Authorization Expiration Date: 10/7/2025  Approved Dose/Quantity: 4 pens per 28 days  Reference #: [id number]   Insurance Company: [employer] ([facility]) - Phone [phone] Fax [phone]  Expected CoPay: $    CoPay Card Available:      Financial Assistance Needed:   Which Pharmacy is filling the prescription: [facility] [place], [place] - [address], [address]  Pharmacy Notified: Not needed  Patient Notified: Not needed

## 2024-10-21 ENCOUNTER — ANCILLARY PROCEDURE (OUTPATIENT)
Dept: MAMMOGRAPHY | Facility: CLINIC | Age: 59
End: 2024-10-21
Attending: PHYSICIAN ASSISTANT
Payer: COMMERCIAL

## 2024-10-21 DIAGNOSIS — Z12.31 VISIT FOR SCREENING MAMMOGRAM: ICD-10-CM

## 2024-10-21 PROCEDURE — 77067 SCR MAMMO BI INCL CAD: CPT | Performed by: RADIOLOGY

## 2024-10-21 PROCEDURE — 77063 BREAST TOMOSYNTHESIS BI: CPT | Performed by: RADIOLOGY

## 2024-11-06 ENCOUNTER — PATIENT OUTREACH (OUTPATIENT)
Dept: CARE COORDINATION | Facility: CLINIC | Age: 59
End: 2024-11-06
Payer: COMMERCIAL

## 2024-11-10 SDOH — HEALTH STABILITY: PHYSICAL HEALTH: ON AVERAGE, HOW MANY DAYS PER WEEK DO YOU ENGAGE IN MODERATE TO STRENUOUS EXERCISE (LIKE A BRISK WALK)?: 2 DAYS

## 2024-11-10 SDOH — HEALTH STABILITY: PHYSICAL HEALTH: ON AVERAGE, HOW MANY MINUTES DO YOU ENGAGE IN EXERCISE AT THIS LEVEL?: 30 MIN

## 2024-11-10 ASSESSMENT — SOCIAL DETERMINANTS OF HEALTH (SDOH): HOW OFTEN DO YOU GET TOGETHER WITH FRIENDS OR RELATIVES?: ONCE A WEEK

## 2024-11-13 ENCOUNTER — OFFICE VISIT (OUTPATIENT)
Dept: FAMILY MEDICINE | Facility: CLINIC | Age: 59
End: 2024-11-13
Payer: COMMERCIAL

## 2024-11-13 VITALS
SYSTOLIC BLOOD PRESSURE: 121 MMHG | OXYGEN SATURATION: 97 % | WEIGHT: 207 LBS | HEIGHT: 63 IN | BODY MASS INDEX: 36.68 KG/M2 | RESPIRATION RATE: 16 BRPM | TEMPERATURE: 96.6 F | DIASTOLIC BLOOD PRESSURE: 82 MMHG | HEART RATE: 77 BPM

## 2024-11-13 DIAGNOSIS — Z13.1 SCREENING FOR DIABETES MELLITUS: ICD-10-CM

## 2024-11-13 DIAGNOSIS — Z13.9 SCREENING FOR CONDITION: ICD-10-CM

## 2024-11-13 DIAGNOSIS — M79.2 NEUROPATHIC PAIN: ICD-10-CM

## 2024-11-13 DIAGNOSIS — E78.5 HYPERLIPIDEMIA LDL GOAL <130: ICD-10-CM

## 2024-11-13 DIAGNOSIS — E89.0 POSTSURGICAL HYPOTHYROIDISM: ICD-10-CM

## 2024-11-13 DIAGNOSIS — Z00.00 ROUTINE GENERAL MEDICAL EXAMINATION AT A HEALTH CARE FACILITY: Primary | ICD-10-CM

## 2024-11-13 DIAGNOSIS — Z13.820 SCREENING FOR OSTEOPOROSIS: ICD-10-CM

## 2024-11-13 LAB
ALBUMIN SERPL BCG-MCNC: 4.4 G/DL (ref 3.5–5.2)
ALP SERPL-CCNC: 78 U/L (ref 40–150)
ALT SERPL W P-5'-P-CCNC: 27 U/L (ref 0–50)
ANION GAP SERPL CALCULATED.3IONS-SCNC: 11 MMOL/L (ref 7–15)
AST SERPL W P-5'-P-CCNC: 21 U/L (ref 0–45)
BILIRUB SERPL-MCNC: 0.4 MG/DL
BUN SERPL-MCNC: 26.3 MG/DL (ref 8–23)
CALCIUM SERPL-MCNC: 9.5 MG/DL (ref 8.8–10.4)
CHLORIDE SERPL-SCNC: 104 MMOL/L (ref 98–107)
CHOLEST SERPL-MCNC: 213 MG/DL
CREAT SERPL-MCNC: 0.79 MG/DL (ref 0.51–0.95)
EGFRCR SERPLBLD CKD-EPI 2021: 86 ML/MIN/1.73M2
FASTING STATUS PATIENT QL REPORTED: YES
FASTING STATUS PATIENT QL REPORTED: YES
GLUCOSE SERPL-MCNC: 92 MG/DL (ref 70–99)
HBV SURFACE AB SERPL IA-ACNC: >1000 M[IU]/ML
HBV SURFACE AB SERPL IA-ACNC: REACTIVE M[IU]/ML
HCO3 SERPL-SCNC: 27 MMOL/L (ref 22–29)
HDLC SERPL-MCNC: 62 MG/DL
LDLC SERPL CALC-MCNC: 131 MG/DL
NONHDLC SERPL-MCNC: 151 MG/DL
POTASSIUM SERPL-SCNC: 4.5 MMOL/L (ref 3.4–5.3)
PROT SERPL-MCNC: 7.8 G/DL (ref 6.4–8.3)
SODIUM SERPL-SCNC: 142 MMOL/L (ref 135–145)
TRIGL SERPL-MCNC: 98 MG/DL
TSH SERPL DL<=0.005 MIU/L-ACNC: 3.18 UIU/ML (ref 0.3–4.2)

## 2024-11-13 PROCEDURE — 84443 ASSAY THYROID STIM HORMONE: CPT | Performed by: PHYSICIAN ASSISTANT

## 2024-11-13 PROCEDURE — 99396 PREV VISIT EST AGE 40-64: CPT | Performed by: PHYSICIAN ASSISTANT

## 2024-11-13 PROCEDURE — 80053 COMPREHEN METABOLIC PANEL: CPT | Performed by: PHYSICIAN ASSISTANT

## 2024-11-13 PROCEDURE — 99214 OFFICE O/P EST MOD 30 MIN: CPT | Mod: 25 | Performed by: PHYSICIAN ASSISTANT

## 2024-11-13 PROCEDURE — 80061 LIPID PANEL: CPT | Performed by: PHYSICIAN ASSISTANT

## 2024-11-13 PROCEDURE — 36415 COLL VENOUS BLD VENIPUNCTURE: CPT | Performed by: PHYSICIAN ASSISTANT

## 2024-11-13 PROCEDURE — 86706 HEP B SURFACE ANTIBODY: CPT | Performed by: PHYSICIAN ASSISTANT

## 2024-11-13 RX ORDER — GABAPENTIN 300 MG/1
CAPSULE ORAL
Qty: 180 CAPSULE | Refills: 3 | Status: SHIPPED | OUTPATIENT
Start: 2024-11-13

## 2024-11-13 RX ORDER — PRAVASTATIN SODIUM 10 MG
10 TABLET ORAL DAILY
Qty: 90 TABLET | Refills: 3 | Status: SHIPPED | OUTPATIENT
Start: 2024-11-13

## 2024-11-13 RX ORDER — LEVOTHYROXINE SODIUM 112 UG/1
112 TABLET ORAL DAILY
Qty: 90 TABLET | Refills: 3 | Status: SHIPPED | OUTPATIENT
Start: 2024-11-13

## 2024-11-13 ASSESSMENT — PAIN SCALES - GENERAL: PAINLEVEL_OUTOF10: NO PAIN (0)

## 2024-11-13 NOTE — PROGRESS NOTES
"Preventive Care Visit  [facility]  Tiana Macdonald PA-C, Family Medicine  Nov 13, 2024      Assessment & Plan     Routine general medical examination at a health care facility  Benign exam except obesity   Mammogram 10/21/24  Up to date on colonoscopy   Covid and flu vaccines up to date     Postsurgical hypothyroidism  Euthyroid at current dose   - TSH WITH FREE T4 REFLEX  - TSH WITH FREE T4 REFLEX  - levothyroxine (SYNTHROID/LEVOTHROID) 112 MCG tablet  Dispense: 90 tablet; Refill: 3    Screening for osteoporosis  Encouraged to schedule bone density   - DX Bone Density    Screening for condition  Has antibodies   - Hepatitis B Surface Antibody  - Hepatitis B Surface Antibody    Hyperlipidemia LDL goal <130     - Lipid panel reflex to direct LDL Fasting  - Comprehensive metabolic panel (BMP + Alb, Alk Phos, ALT, AST, Total. Bili, TP)  - Lipid panel reflex to direct LDL Fasting  - Comprehensive metabolic panel (BMP + Alb, Alk Phos, ALT, AST, Total. Bili, TP)  - pravastatin (PRAVACHOL) 10 MG tablet  Dispense: 90 tablet; Refill: 3    Screening for diabetes mellitus    - Comprehensive metabolic panel (BMP + Alb, Alk Phos, ALT, AST, Total. Bili, TP)  - Comprehensive metabolic panel (BMP + Alb, Alk Phos, ALT, AST, Total. Bili, TP)    Neuropathic pain  Has used gabapentin in past - only needs at night   - gabapentin (NEURONTIN) 300 MG capsule  Dispense: 180 capsule; Refill: 3              BMI  Estimated body mass index is 36.67 kg/m  as calculated from the following:    Height as of this encounter: 1.6 m (5' 3\").    Weight as of this encounter: 93.9 kg (207 lb).   Weight management plan: continues to follow up with endocrinology    Counseling  Appropriate preventive services were addressed with this patient via screening, questionnaire, or discussion as appropriate for fall prevention, nutrition, physical activity, Tobacco-use cessation, social engagement, weight loss and cognition.  " Checklist reviewing preventive services available has been given to the patient.  Reviewed patient's diet, addressing concerns and/or questions.   She is at risk for lack of exercise and has been provided with information to increase physical activity for the benefit of her well-being.        I will notify you of lab results via [employer].    I will send over prescriptions once I have your labs back   I have ordered a dexa scan for screening for osteoporosis.  Check into insurance coverage.  Schedule at [facility] ([phone]) formerly called [facility].       Felice Loera is a 58 year old, presenting for the following:  Physical        11/13/2024     7:46 AM   Additional Questions   Roomed by Joao   Accompanied by Self         11/13/2024     7:46 AM   Patient Reported Additional Medications   Patient reports taking the following new medications None          HPI  Patient known to me with history of hypothyroidism, hyperlipidemia and obesity presents for annual exam.  History of traumatic injury with fracture and compartment syndrome with surgeries almost four years ago - can only walk approximately a  mile.  used to be on gabapentin and stopped taking wonder if can take at bedtime.   Followed by endocrinology and on zepbound and continuing to lose weight.   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      11/10/2024   General Health   How would you rate your overall physical health? Good   Feel stress (tense, anxious, or unable to sleep) Only a little      (!) STRESS CONCERN      11/10/2024   Nutrition   Three or more servings of calcium each day? Yes   Diet: Carbohydrate counting    Breakfast skipped   How many servings of fruit and vegetables per day? (!) 2-3   How many sweetened beverages each day? 0-1       Multiple  values from one day are sorted in reverse-chronological order         11/10/2024   Exercise   Days per week of moderate/strenous exercise 2 days   Average minutes spent exercising at this level 30 min      (!) EXERCISE CONCERN      11/10/2024   Social Factors   Frequency of gathering with friends or relatives Once a week   Worry food won't last until get money to buy more No   Food not last or not have enough money for food? No   Do you have housing? (Housing is defined as stable permanent housing and does not include staying ouside in a car, in a tent, in an abandoned building, in an overnight shelter, or couch-surfing.) Yes   Are you worried about losing your housing? No   Lack of transportation? No   Unable to get utilities (heat,electricity)? No            11/10/2024   Fall Risk   Fallen 2 or more times in the past year? No    Trouble with walking or balance? No        Patient-reported          11/10/2024   Dental   Dentist two times every year? Yes                 Today's PHQ-2 Score:       9/13/2024     2:32 PM   PHQ-2 ( 1999 [employer])   Q1: Little interest or pleasure in doing things 0   Q2: Feeling down, depressed or hopeless 0   PHQ-2 Score 0         11/10/2024   Substance Use   Alcohol more than 3/day or more than 7/wk No   Do you use any other substances recreationally? No        Social History     Tobacco Use    Smoking status: Never     Passive exposure: Never    Smokeless tobacco: Never   Vaping Use    Vaping status: Never Used   Substance Use Topics    Alcohol use: Yes     Comment: 1-3/week    Drug use: No           10/21/2024   LAST FHS-7 RESULTS   1st degree relative breast or ovarian cancer No   Any relative bilateral breast cancer No   Any male have breast cancer No   Any ONE woman have BOTH breast AND ovarian cancer No   Any woman with breast cancer before 50yrs No   2 or more relatives with breast AND/OR ovarian cancer No   2 or more relatives with breast AND/OR bowel cancer No           Mammogram  Screening - Mammogram every 1-2 years updated in Health Maintenance based on mutual decision making        11/10/2024   STI Screening   New sexual partner(s) since last STI/HIV test? No        History of abnormal Pap smear: No - age 30-64 HPV with reflex Pap every 5 years recommended        Latest Ref Rng & Units 7/20/2022     4:30 PM 2/19/2016    11:22 AM 2/19/2016    12:00 AM   PAP / HPV   PAP  Negative for Intraepithelial Lesion or Malignancy (NILM)      PAP (Historical)    NIL    HPV 16 DNA Negative Negative  Negative     HPV 18 DNA Negative Negative  Negative     Other HR HPV Negative Negative  Negative       ASCVD Risk   The 10-year ASCVD risk score (Tonya WILSON, et al., 2019) is: 2.2%    Values used to calculate the score:      Age: 58 years      Sex: Female      Is Non- : No      Diabetic: No      Tobacco smoker: No      Systolic Blood Pressure: 121 mmHg      Is BP treated: No      HDL Cholesterol: 54 mg/dL      Total Cholesterol: 154 mg/dL           Reviewed and updated as needed this visit by Provider   Tobacco   Meds   Med Hx  Surg Hx  Fam Hx  Soc Hx Sexual Activity          BP Readings from Last 3 Encounters:   11/13/24 121/82   11/29/23 136/83   07/20/23 132/84    Wt Readings from Last 3 Encounters:   11/13/24 93.9 kg (207 lb)   09/13/24 93.4 kg (206 lb)   04/19/24 96.6 kg (213 lb)                  Patient Active Problem List   Diagnosis    CARDIOVASCULAR SCREENING; LDL GOAL LESS THAN 160    Thyroid nodule    GERD (gastroesophageal reflux disease)    Family history of ischemic heart disease    Class 2 severe obesity with serious comorbidity and body mass index (BMI) of 38.0 to 38.9 in adult, unspecified obesity type (H)    Hyperlipidemia LDL goal <130    Postsurgical hypothyroidism     Past Surgical History:   Procedure Laterality Date    BACK SURGERY  2001    lumbar discectomy    COLONOSCOPY  2018    COLONOSCOPY WITH CO2 INSUFFLATION N/A 06/20/2016    Procedure:  COLONOSCOPY WITH CO2 INSUFFLATION;  Surgeon: Miguelito Erazo MD;  Location: [facility] OR    GYN SURGERY      HC REMOVE TONSILS/ADENOIDS,12+ Y/O  Age 14    left navicular fracture   12/17/2019    ORIF right tibial plateau fracture Right 12/26/2019    ORTHOPEDIC SURGERY      percutaneous pinning of foot Left 12/26/2019    right knee external fixator Right 12/17/2019    right leg fasciotomy with wound vac placement  12/17/2019    SOFT TISSUE SURGERY  12/19    right leg fasciotomy    THYROIDECTOMY  05/08/2012    Procedure:THYROIDECTOMY; LEFT THYROID LOBECTOMY; Surgeon:STEPHANIA FERNÁNDEZ; Location: OR    UPPER GI ENDOSCOPY  02/2012    bx's okay, rec different PPI    ZZC APPENDECTOMY  Age 11    ZZ NONSPECIFIC PROCEDURE  1988    Right oophorectomy and dermoid cyst removal    [facility] NONSPECIFIC PROCEDURE  1997    Partial Left oophorectomy and dermoid cyst removal    [facility] NONSPECIFIC PROCEDURE  2002    L 4-5 herniated disc w/ laminectomy    Z NONSPECIFIC PROCEDURE  04/2003    Midcarpal Instability-Right wrist reconstruction    [facility] NONSPECIFIC PROCEDURE  1999    Left breast biopsy-benign       Social History     Tobacco Use    Smoking status: Never     Passive exposure: Never    Smokeless tobacco: Never   Substance Use Topics    Alcohol use: Yes     Comment: 1-3/week     Family History   Problem Relation Age of Onset    Cancer Mother 89        Pancreatic    Hypertension Mother     Arthritis Mother     Lipids Mother     Osteoporosis Mother         on meds, did have metarsal fx from slip    Thyroid Disease Mother         Hypothyroid    Coronary Artery Disease Mother 89        cardiac stents    Other Cancer Mother         pancreatic    C.A.D. Father 65        Quadruple bypass- age 65    Hypertension Father     Prostate Cancer Father     Blood Disease Father         Anti-cardiolipin syndrome, passed away from aneursym     Lipids Father     Thyroid Disease Father         Hypothyroid    Coronary Artery Disease Father         quad bypass     Hyperlipidemia Father     Diabetes Maternal Grandmother         Type 2    Diabetes Paternal Grandfather     Coronary Artery Disease Brother 54        Stent at 54 yrs    Other Cancer Brother         kidney cancer    Coronary Artery Disease Brother 49        quad bypass    Other Cancer Brother         kidney    Kidney Cancer Brother 49        smoker    Blood Disease Sister         Vonwillenbrand Syndrome    Thyroid Disease Sister     Cancer Sister 56         of pancreatic cancer w/in 3 mo of dx    Other Cancer Sister 59        pancreatic    Thyroid Disease Sister     Cerebrovascular Disease No family hx of     Breast Cancer No family hx of     Colon Cancer No family hx of     Depression No family hx of     Anxiety Disorder No family hx of     Mental Illness No family hx of     Substance Abuse No family hx of     Anesthesia Reaction No family hx of     Asthma No family hx of     Genetic Disorder No family hx of     Obesity No family hx of     Unknown/Adopted No family hx of          Current Outpatient Medications   Medication Sig Dispense Refill    gabapentin (NEURONTIN) 300 MG capsule 1-2 at bedtime as needed for pain. 180 capsule 3    levothyroxine (SYNTHROID/LEVOTHROID) 112 MCG tablet Take 1 tablet (112 mcg) by mouth daily. 90 tablet 3    Multiple Vitamin (THERAPEUTIC MULTIVITAMIN PO)       naproxen sodium (ANAPROX) 220 MG tablet Take 220 mg by mouth 2 times daily as needed for moderate pain      pravastatin (PRAVACHOL) 10 MG tablet Take 1 tablet (10 mg) by mouth daily. 90 tablet 3    tirzepatide-Weight Management (ZEPBOUND) 10 MG/0.5ML prefilled pen Inject 0.5 mLs (10 mg) subcutaneously every 7 days. 2 mL 4         Review of Systems  CONSTITUTIONAL:has had intentional weight loss   INTEGUMENTARY/SKIN: NEGATIVE for worrisome rashes, moles or lesions  EYES: NEGATIVE for vision changes or irritation  ENT/MOUTH: NEGATIVE for ear, mouth and throat problems  RESP: NEGATIVE for significant cough or SOB  BREAST:  "NEGATIVE for masses, tenderness or discharge  CV: NEGATIVE for chest pain, palpitations or peripheral edema  GI: NEGATIVE for nausea, abdominal pain, heartburn, or change in bowel habits  : NEGATIVE for frequency, dysuria, or hematuria  MUSCULOSKELETAL:has leg pain related to old trauma   NEURO: NEGATIVE for weakness, dizziness or paresthesias  ENDOCRINE: NEGATIVE for temperature intolerance, skin/hair changes  HEME: NEGATIVE for bleeding problems  PSYCHIATRIC: NEGATIVE for changes in mood or affect     Objective    Exam  /82 (BP Location: Right arm, Patient Position: Sitting, Cuff Size: Adult Regular)   Pulse 77   Temp (!) 96.6  F (35.9  C) (Tympanic)   Resp 16   Ht 1.6 m (5' 3\")   Wt 93.9 kg (207 lb)   LMP 09/10/2012 (Exact Date)   SpO2 97%   BMI 36.67 kg/m     Estimated body mass index is 36.67 kg/m  as calculated from the following:    Height as of this encounter: 1.6 m (5' 3\").    Weight as of this encounter: 93.9 kg (207 lb).    Physical Exam  GENERAL: alert, no distress, and obese  EYES: Eyes grossly normal to inspection, PERRL and conjunctivae and sclerae normal  HENT: ear canals and TM's normal, nose and mouth without ulcers or lesions  NECK: no adenopathy, no asymmetry, masses, or scars  RESP: lungs clear to auscultation - no rales, rhonchi or wheezes  BREAST: normal without masses, tenderness or nipple discharge and no palpable axillary masses or adenopathy  CV: regular rate and rhythm, normal S1 S2, no S3 or S4, no murmur, click or rub, no peripheral edema  ABDOMEN: soft, nontender, no hepatosplenomegaly, no masses and bowel sounds normal   (female): normal female external genitalia, normal urethral meatus , vaginal mucosal atrophy, and normal cervix, adnexae, and uterus without masses.  MS: no gross musculoskeletal defects noted, no edema  SKIN: no suspicious lesions or rashes  NEURO: Normal strength and tone, mentation intact and speech normal  PSYCH: mentation appears normal, affect " normal/bright        Signed Electronically by: Tiana Macdonald PA-C

## 2024-11-13 NOTE — PATIENT INSTRUCTIONS
I will notify you of lab results via [email or web address].    I will send over prescriptions once I have your labs back   I have ordered a dexa scan for screening for osteoporosis.  Check into insurance coverage.  Schedule at [facility] ([phone]) formerly called [facility].     Patient Education   Preventive Care Advice   This is general advice given by our system to help you stay healthy. However, your care team may have specific advice just for you. Please talk to your care team about your preventive care needs.  Nutrition  Eat 5 or more servings of fruits and vegetables each day.  Try wheat bread, brown rice and whole grain pasta (instead of white bread, rice, and pasta).  Get enough calcium and vitamin D. Check the label on foods and aim for 100% of the RDA (recommended daily allowance).  Lifestyle  Exercise at least 150 minutes each week  (30 minutes a day, 5 days a week).  Do muscle strengthening activities 2 days a week. These help control your weight and prevent disease.  No smoking.  Wear sunscreen to prevent skin cancer.  Have a dental exam and cleaning every 6 months.  Yearly exams  See your health care team every year to talk about:  Any changes in your health.  Any medicines your care team has prescribed.  Preventive care, family planning, and ways to prevent chronic diseases.  Shots (vaccines)   HPV shots (up to age 26), if you've never had them before.  Hepatitis B shots (up to age 59), if you've never had them before.  COVID-19 shot: Get this shot when it's due.  Flu shot: Get a flu shot every year.  Tetanus shot: Get a tetanus shot every 10 years.  Pneumococcal, hepatitis A, and RSV shots: Ask your care team if you need these based on your risk.  Shingles shot (for age 50 and up)  General health tests  Diabetes screening:  Starting at age 35, Get screened for diabetes at least every 3 years.  If you are younger than age 35, ask your care team if you should be screened for  diabetes.  Cholesterol test: At age 39, start having a cholesterol test every 5 years, or more often if advised.  Bone density scan (DEXA): At age 50, ask your care team if you should have this scan for osteoporosis (brittle bones).  Hepatitis C: Get tested at least once in your life.  STIs (sexually transmitted infections)  Before age 24: Ask your care team if you should be screened for STIs.  After age 24: Get screened for STIs if you're at risk. You are at risk for STIs (including HIV) if:  You are sexually active with more than one person.  You don't use condoms every time.  You or a partner was diagnosed with a sexually transmitted infection.  If you are at risk for HIV, ask about PrEP medicine to prevent HIV.  Get tested for HIV at least once in your life, whether you are at risk for HIV or not.  Cancer screening tests  Cervical cancer screening: If you have a cervix, begin getting regular cervical cancer screening tests starting at age 21.  Breast cancer scan (mammogram): If you've ever had breasts, begin having regular mammograms starting at age 40. This is a scan to check for breast cancer.  Colon cancer screening: It is important to start screening for colon cancer at age 45.  Have a colonoscopy test every 10 years (or more often if you're at risk) Or, ask your provider about stool tests like a FIT test every year or Cologuard test every 3 years.  To learn more about your testing options, visit:   .  For help making a decision, visit:   https://bit.ly/jc67722.  Prostate cancer screening test: If you have a prostate, ask your care team if a prostate cancer screening test (PSA) at age 55 is right for you.  Lung cancer screening: If you are a current or former smoker ages 50 to 80, ask your care team if ongoing lung cancer screenings are right for you.  For informational purposes only. Not to replace the advice of your health care provider. Copyright   2023 [employer]. All rights reserved.  Clinically reviewed by the [facility] Transitions Program. [employer] 932419 - REV 01/24.

## 2024-11-13 NOTE — RESULT ENCOUNTER NOTE
Dear Luz Maria  Your cholesterol looks ok but not as good as it was 9 months ago.  Your electrolytes, blood sugar, kidney function and liver function were normal.     You are immune to hepatitis B  Your thyroid function was normal.     Please call or MyChart my office with any questions or concerns.   Tiana Macdonald, PAC

## 2024-11-14 ENCOUNTER — PATIENT OUTREACH (OUTPATIENT)
Dept: CARE COORDINATION | Facility: CLINIC | Age: 59
End: 2024-11-14
Payer: COMMERCIAL

## 2024-12-26 ENCOUNTER — ANCILLARY PROCEDURE (OUTPATIENT)
Dept: BONE DENSITY | Facility: CLINIC | Age: 59
End: 2024-12-26
Attending: PHYSICIAN ASSISTANT
Payer: COMMERCIAL

## 2024-12-26 DIAGNOSIS — Z13.820 SCREENING FOR OSTEOPOROSIS: ICD-10-CM

## 2024-12-26 PROCEDURE — 77080 DXA BONE DENSITY AXIAL: CPT | Performed by: RADIOLOGY

## 2025-01-30 ENCOUNTER — VIRTUAL VISIT (OUTPATIENT)
Dept: ENDOCRINOLOGY | Facility: CLINIC | Age: 60
End: 2025-01-30
Attending: NURSE PRACTITIONER
Payer: COMMERCIAL

## 2025-01-30 VITALS — WEIGHT: 210 LBS | HEIGHT: 64 IN | BODY MASS INDEX: 35.85 KG/M2

## 2025-01-30 DIAGNOSIS — G47.33 OSA (OBSTRUCTIVE SLEEP APNEA): ICD-10-CM

## 2025-01-30 DIAGNOSIS — E66.01 CLASS 2 SEVERE OBESITY WITH SERIOUS COMORBIDITY AND BODY MASS INDEX (BMI) OF 38.0 TO 38.9 IN ADULT, UNSPECIFIED OBESITY TYPE (H): Primary | ICD-10-CM

## 2025-01-30 DIAGNOSIS — E66.812 CLASS 2 SEVERE OBESITY WITH SERIOUS COMORBIDITY AND BODY MASS INDEX (BMI) OF 38.0 TO 38.9 IN ADULT, UNSPECIFIED OBESITY TYPE (H): Primary | ICD-10-CM

## 2025-01-30 ASSESSMENT — PAIN SCALES - GENERAL: PAINLEVEL_OUTOF10: NO PAIN (0)

## 2025-01-30 NOTE — LETTER
2025       RE: Thelma Uribe  [address]  [facility] [address]     Dear Colleague,    Thank you for referring your patient, Thelma Uribe, to the [facility] [place] at [facility]. Please see a copy of my visit note below.      Return Medical Weight Management Note     Thelma Uribe  MRN:  [id number]  :  1965  LUIS:  2025    Dear Tiana Macdonald PA-C,    I had the pleasure of seeing your patient Thelma Uribe. She is a 59 year old female who I am continuing to see for treatment of obesity related to:        2023    10:24 AM   --   I have the following health issues associated with obesity High Cholesterol    Sleep Apnea    Polycystic Ovarian Syndrome    Infertility    Fatty Liver    Osteoarthritis (joint disease)    Hypothyroidism       Assessment & Plan  Problem List Items Addressed This Visit          Digestive    Class 2 severe obesity with serious comorbidity and body mass index (BMI) of 38.0 to 38.9 in adult, unspecified obesity type (H) - Primary     Change in work schedule and stress level since last seen. Has identified that these changes have also impacted regularity of her exercise which had previously been really consistent. Can also notice increased food noise and cravings; more difficulty saying no to sweet cravings especially when presented to her. Has felt cortisol has been increasing in relation to these changes which is impacting sleep too. Recommend dose increase in zepbound to help shift back with apptiete and cravings. Discussed smaller goals for activity to get away from all or nothing mindset and help reduce stress/ cortisol levels.     She thinks she has zepbound coverage this year but less coverage. Did discuss option of compounding semaglutide with [facility] if needed. Also aware of coupon for zepbound.     Increase zepbound to 12.5mg- could  consider dose adjustment if needed prior to follow up   Look for ways to make smaller goals to keep away from all or nothing mentality  Great work with mindfulness around sweet cravings and habits   Follow up 4 months          Relevant Medications    tirzepatide-Weight Management (ZEPBOUND) 12.5 MG/0.5ML prefilled pen    Other Relevant Orders    Adult Bariatrics and Weight Management Clinic Follow-Up Order     Other Visit Diagnoses       LETY (obstructive sleep apnea)        Relevant Medications    tirzepatide-Weight Management (ZEPBOUND) 12.5 MG/0.5ML prefilled pen    Other Relevant Orders    Adult Bariatrics and Weight Management Clinic Follow-Up Order               INTERVAL HISTORY:  New MWM Dr. Graham 8/2023- qsyimia but had side effects, switched to [id number] bid. NBS 2/2023 BMI 40 with HLD, LETY, PCOS, s/p thyroidectomy. Switched to wegovy 2/2023 (BMI 40.9). Deciding between mwm and surgery. Has completed work up for surgery but still considering. Last seen 12/2023 going to try to switch to zepbound.       Anti-obesity medication history    Current:   Zepbound 10mg       Past/Failed/contraindicated:   Wegovy- plateau     Recent diet changes:   Noticing less control around sweets   Doesn't feel like eating     Recent exercise/activity changes:   Exercise declined with change in work schedule     Recent stressors:   Was retired and working part time when last seen   Since October, she has started working full time and taking call - team needs more support - good set up feels okay but more time than she anticipated   Feeling more stress. Feeling higher cortisol levels     Recent sleep changes:   Noticing 3 am wake ups     Vitamins/Labs: 11/2024    CURRENT WEIGHT:   210 lbs 0 oz    Initial Weight (lbs): 245 lbs  Last Visits Weight: 93.4 kg (206 lb)  Cumulative weight loss (lbs): 35  Weight Loss Percentage: 14.29%        1/27/2025     7:45 PM   Changes and Difficulties   I have made the following changes to  my diet since my last visit: Protein emphasis   With regards to my diet, I am still struggling with: Portion size   I have made the following changes to my activity/exercise since my last visit: Still eliptical, weights   With regards to my activity/exercise, I am still struggling with: Swimming less with increase work schedule         MEDICATIONS:   Current Outpatient Medications   Medication Sig Dispense Refill     tirzepatide-Weight Management (ZEPBOUND) 12.5 MG/0.5ML prefilled pen Inject 0.5 mLs (12.5 mg) subcutaneously every 7 days. 2 mL 2     gabapentin (NEURONTIN) 300 MG capsule 1-2 at bedtime as needed for pain. 180 capsule 3     levothyroxine (SYNTHROID/LEVOTHROID) 112 MCG tablet Take 1 tablet (112 mcg) by mouth daily. 90 tablet 3     Multiple Vitamin (THERAPEUTIC MULTIVITAMIN PO)        naproxen sodium (ANAPROX) 220 MG tablet Take 220 mg by mouth 2 times daily as needed for moderate pain       pravastatin (PRAVACHOL) 10 MG tablet Take 1 tablet (10 mg) by mouth daily. 90 tablet 3     tirzepatide-Weight Management (ZEPBOUND) 10 MG/0.5ML prefilled pen Inject 0.5 mLs (10 mg) subcutaneously every 7 days. 2 mL 4           1/27/2025     7:45 PM   Weight Loss Medication History Reviewed With Patient   Are you having any side effects from the weight loss medication that we have prescribed you? No       Office Visit on 11/13/2024   Component Date Value Ref Range Status     TSH 11/13/2024 3.18  0.30 - 4.20 uIU/mL Final     Hepatitis B Surface Antibody 11/13/2024 Reactive   Final    A reactive result indicates recovery from acute or chronic hepatitis B virus (HBV) infection or acquired immunity from HBV vaccination. This assay does not differentiate between a vaccine-induced immune response and an immune response induced by infection with HBV. A positive total antihepatitis B core result would indicate that the hepatitis B surface antibody response is due to past HBV infection.     Hepatitis B Surface Antibody Instr*  "11/13/2024 >1,000.00  <8.5 m[IU]/mL Final     Cholesterol 11/13/2024 213 (H)  <200 mg/dL Final     Triglycerides 11/13/2024 98  <150 mg/dL Final     Direct Measure HDL 11/13/2024 62  >=50 mg/dL Final     LDL Cholesterol Calculated 11/13/2024 131 (H)  <100 mg/dL Final     Non HDL Cholesterol 11/13/2024 151 (H)  <130 mg/dL Final     Patient Fasting > 8hrs? 11/13/2024 Yes   Final     Sodium 11/13/2024 142  135 - 145 mmol/L Final     Potassium 11/13/2024 4.5  3.4 - 5.3 mmol/L Final     Carbon Dioxide (CO2) 11/13/2024 27  22 - 29 mmol/L Final     Anion Gap 11/13/2024 11  7 - 15 mmol/L Final     Urea Nitrogen 11/13/2024 26.3 (H)  8.0 - 23.0 mg/dL Final     Creatinine 11/13/2024 0.79  0.51 - 0.95 mg/dL Final     GFR Estimate 11/13/2024 86  >60 mL/min/1.73m2 Final    eGFR calculated using 2021 CKD-EPI equation.     Calcium 11/13/2024 9.5  8.8 - 10.4 mg/dL Final    Reference intervals for this test were updated on 7/16/2024 to reflect our healthy population more accurately. There may be differences in the flagging of prior results with similar values performed with this method. Those prior results can be interpreted in the context of the updated reference intervals.     Chloride 11/13/2024 104  98 - 107 mmol/L Final     Glucose 11/13/2024 92  70 - 99 mg/dL Final     Alkaline Phosphatase 11/13/2024 78  40 - 150 U/L Final     AST 11/13/2024 21  0 - 45 U/L Final     ALT 11/13/2024 27  0 - 50 U/L Final     Protein Total 11/13/2024 7.8  6.4 - 8.3 g/dL Final     Albumin 11/13/2024 4.4  3.5 - 5.2 g/dL Final     Bilirubin Total 11/13/2024 0.4  <=1.2 mg/dL Final     Patient Fasting > 8hrs? 11/13/2024 Yes   Final           9/9/2010     9:00 AM 5/28/2019     9:10 AM   JUANITA Score (Last Two)   JUANITA Raw Score 41 37   Activation Score 63.2 79.2   JUANITA Level 3 4         PHYSICAL EXAM:  Objective   Ht 1.626 m (5' 4.02\")   Wt 95.3 kg (210 lb)   LMP 09/10/2012 (Exact Date)   BMI 36.03 kg/m      Vitals - Patient Reported  Pain Score: No Pain " (0)        GENERAL: alert and no distress  EYES: Eyes grossly normal to inspection.  No discharge or erythema, or obvious scleral/conjunctival abnormalities.  RESP: No audible wheeze, cough, or visible cyanosis.    SKIN: Visible skin clear. No significant rash, abnormal pigmentation or lesions.  NEURO: Cranial nerves grossly intact.  Mentation and speech appropriate for age.  PSYCH: Appropriate affect, tone, and pace of words        Sincerely,    Charis Fuller NP      20 minutes spent by me on the date of the encounter doing chart review, history and exam, documentation and further activities per the note    The longitudinal plan of care for the diagnosis(es)/condition(s) as documented were addressed during this visit. Due to the added complexity in care, I will continue to support Luz Maria in the subsequent management and with ongoing continuity of care.    Virtual Visit Details    Type of service:  Video Visit     Originating Location (pt. Location): Home    Distant Location (provider location):  Off-site  Platform used for Video Visit: AmWell      Again, thank you for allowing me to participate in the care of your patient.      Sincerely,    Charis Fuller NP

## 2025-01-30 NOTE — ASSESSMENT & PLAN NOTE
Change in work schedule and stress level since last seen. Has identified that these changes have also impacted regularity of her exercise which had previously been really consistent. Can also notice increased food noise and cravings; more difficulty saying no to sweet cravings especially when presented to her. Has felt cortisol has been increasing in relation to these changes which is impacting sleep too. Recommend dose increase in zepbound to help shift back with apptiete and cravings. Discussed smaller goals for activity to get away from all or nothing mindset and help reduce stress/ cortisol levels.     She thinks she has zepbound coverage this year but less coverage. Did discuss option of compounding semaglutide with [facility] if needed. Also aware of coupon for zepbound.     Increase zepbound to 12.5mg- could consider dose adjustment if needed prior to follow up   Look for ways to make smaller goals to keep away from all or nothing mentality  Great work with mindfulness around sweet cravings and habits   Follow up 4 months

## 2025-01-30 NOTE — PROGRESS NOTES
Return Medical Weight Management Note     Thelma Uribe  MRN:  [id number]  :  1965  LUIS:  2025    Dear Tiana Macdonald PA-C,    I had the pleasure of seeing your patient Thelma Uribe. She is a 59 year old female who I am continuing to see for treatment of obesity related to:        2023    10:24 AM   --   I have the following health issues associated with obesity High Cholesterol    Sleep Apnea    Polycystic Ovarian Syndrome    Infertility    Fatty Liver    Osteoarthritis (joint disease)    Hypothyroidism       Assessment & Plan   Problem List Items Addressed This Visit          Digestive    Class 2 severe obesity with serious comorbidity and body mass index (BMI) of 38.0 to 38.9 in adult, unspecified obesity type (H) - Primary     Change in work schedule and stress level since last seen. Has identified that these changes have also impacted regularity of her exercise which had previously been really consistent. Can also notice increased food noise and cravings; more difficulty saying no to sweet cravings especially when presented to her. Has felt cortisol has been increasing in relation to these changes which is impacting sleep too. Recommend dose increase in zepbound to help shift back with apptiete and cravings. Discussed smaller goals for activity to get away from all or nothing mindset and help reduce stress/ cortisol levels.     She thinks she has zepbound coverage this year but less coverage. Did discuss option of compounding semaglutide with [facility] if needed. Also aware of coupon for zepbound.     Increase zepbound to 12.5mg- could consider dose adjustment if needed prior to follow up   Look for ways to make smaller goals to keep away from all or nothing mentality  Great work with mindfulness around sweet cravings and habits   Follow up 4 months          Relevant Medications    tirzepatide-Weight Management (ZEPBOUND) 12.5 MG/0.5ML prefilled pen    Other Relevant  Orders    Adult Bariatrics and Weight Management Clinic Follow-Up Order     Other Visit Diagnoses       LETY (obstructive sleep apnea)        Relevant Medications    tirzepatide-Weight Management (ZEPBOUND) 12.5 MG/0.5ML prefilled pen    Other Relevant Orders    Adult Bariatrics and Weight Management Clinic Follow-Up Order               INTERVAL HISTORY:  New MWM Dr. Graham 8/2023- qsyimia but had side effects, switched to [id number] bid. NBS 2/2023 BMI 40 with HLD, LETY, PCOS, s/p thyroidectomy. Switched to wegovy 2/2023 (BMI 40.9). Deciding between mwm and surgery. Has completed work up for surgery but still considering. Last seen 12/2023 going to try to switch to zepbound.       Anti-obesity medication history    Current:   Zepbound 10mg       Past/Failed/contraindicated:   Wegovy- plateau     Recent diet changes:   Noticing less control around sweets   Doesn't feel like eating     Recent exercise/activity changes:   Exercise declined with change in work schedule     Recent stressors:   Was retired and working part time when last seen   Since October, she has started working full time and taking call - team needs more support - good set up feels okay but more time than she anticipated   Feeling more stress. Feeling higher cortisol levels     Recent sleep changes:   Noticing 3 am wake ups     Vitamins/Labs: 11/2024    CURRENT WEIGHT:   210 lbs 0 oz    Initial Weight (lbs): 245 lbs  Last Visits Weight: 93.4 kg (206 lb)  Cumulative weight loss (lbs): 35  Weight Loss Percentage: 14.29%        1/27/2025     7:45 PM   Changes and Difficulties   I have made the following changes to my diet since my last visit: Protein emphasis   With regards to my diet, I am still struggling with: Portion size   I have made the following changes to my activity/exercise since my last visit: Still eliptical, weights   With regards to my activity/exercise, I am still struggling with: Swimming less with increase work schedule          MEDICATIONS:   Current Outpatient Medications   Medication Sig Dispense Refill    tirzepatide-Weight Management (ZEPBOUND) 12.5 MG/0.5ML prefilled pen Inject 0.5 mLs (12.5 mg) subcutaneously every 7 days. 2 mL 2    gabapentin (NEURONTIN) 300 MG capsule 1-2 at bedtime as needed for pain. 180 capsule 3    levothyroxine (SYNTHROID/LEVOTHROID) 112 MCG tablet Take 1 tablet (112 mcg) by mouth daily. 90 tablet 3    Multiple Vitamin (THERAPEUTIC MULTIVITAMIN PO)       naproxen sodium (ANAPROX) 220 MG tablet Take 220 mg by mouth 2 times daily as needed for moderate pain      pravastatin (PRAVACHOL) 10 MG tablet Take 1 tablet (10 mg) by mouth daily. 90 tablet 3    tirzepatide-Weight Management (ZEPBOUND) 10 MG/0.5ML prefilled pen Inject 0.5 mLs (10 mg) subcutaneously every 7 days. 2 mL 4           1/27/2025     7:45 PM   Weight Loss Medication History Reviewed With Patient   Are you having any side effects from the weight loss medication that we have prescribed you? No       Office Visit on 11/13/2024   Component Date Value Ref Range Status    TSH 11/13/2024 3.18  0.30 - 4.20 uIU/mL Final    Hepatitis B Surface Antibody 11/13/2024 Reactive   Final    A reactive result indicates recovery from acute or chronic hepatitis B virus (HBV) infection or acquired immunity from HBV vaccination. This assay does not differentiate between a vaccine-induced immune response and an immune response induced by infection with HBV. A positive total antihepatitis B core result would indicate that the hepatitis B surface antibody response is due to past HBV infection.    Hepatitis B Surface Antibody Instr* 11/13/2024 >1,000.00  <8.5 m[IU]/mL Final    Cholesterol 11/13/2024 213 (H)  <200 mg/dL Final    Triglycerides 11/13/2024 98  <150 mg/dL Final    Direct Measure HDL 11/13/2024 62  >=50 mg/dL Final    LDL Cholesterol Calculated 11/13/2024 131 (H)  <100 mg/dL Final    Non HDL Cholesterol 11/13/2024 151 (H)  <130 mg/dL Final    Patient  "Fasting > 8hrs? 11/13/2024 Yes   Final    Sodium 11/13/2024 142  135 - 145 mmol/L Final    Potassium 11/13/2024 4.5  3.4 - 5.3 mmol/L Final    Carbon Dioxide (CO2) 11/13/2024 27  22 - 29 mmol/L Final    Anion Gap 11/13/2024 11  7 - 15 mmol/L Final    Urea Nitrogen 11/13/2024 26.3 (H)  8.0 - 23.0 mg/dL Final    Creatinine 11/13/2024 0.79  0.51 - 0.95 mg/dL Final    GFR Estimate 11/13/2024 86  >60 mL/min/1.73m2 Final    eGFR calculated using 2021 CKD-EPI equation.    Calcium 11/13/2024 9.5  8.8 - 10.4 mg/dL Final    Reference intervals for this test were updated on 7/16/2024 to reflect our healthy population more accurately. There may be differences in the flagging of prior results with similar values performed with this method. Those prior results can be interpreted in the context of the updated reference intervals.    Chloride 11/13/2024 104  98 - 107 mmol/L Final    Glucose 11/13/2024 92  70 - 99 mg/dL Final    Alkaline Phosphatase 11/13/2024 78  40 - 150 U/L Final    AST 11/13/2024 21  0 - 45 U/L Final    ALT 11/13/2024 27  0 - 50 U/L Final    Protein Total 11/13/2024 7.8  6.4 - 8.3 g/dL Final    Albumin 11/13/2024 4.4  3.5 - 5.2 g/dL Final    Bilirubin Total 11/13/2024 0.4  <=1.2 mg/dL Final    Patient Fasting > 8hrs? 11/13/2024 Yes   Final           9/9/2010     9:00 AM 5/28/2019     9:10 AM   JUANITA Score (Last Two)   JUANITA Raw Score 41 37   Activation Score 63.2 79.2   JUANITA Level 3 4         PHYSICAL EXAM:  Objective    Ht 1.626 m (5' 4.02\")   Wt 95.3 kg (210 lb)   LMP 09/10/2012 (Exact Date)   BMI 36.03 kg/m      Vitals - Patient Reported  Pain Score: No Pain (0)        GENERAL: alert and no distress  EYES: Eyes grossly normal to inspection.  No discharge or erythema, or obvious scleral/conjunctival abnormalities.  RESP: No audible wheeze, cough, or visible cyanosis.    SKIN: Visible skin clear. No significant rash, abnormal pigmentation or lesions.  NEURO: Cranial nerves grossly intact.  Mentation and speech " appropriate for age.  PSYCH: Appropriate affect, tone, and pace of words        Sincerely,    Charis Fuller NP      20 minutes spent by me on the date of the encounter doing chart review, history and exam, documentation and further activities per the note    The longitudinal plan of care for the diagnosis(es)/condition(s) as documented were addressed during this visit. Due to the added complexity in care, I will continue to support Luz Maria in the subsequent management and with ongoing continuity of care.    Virtual Visit Details    Type of service:  Video Visit     Originating Location (pt. Location): Home    Distant Location (provider location):  Off-site  Platform used for Video Visit: Varsha

## 2025-01-30 NOTE — PATIENT INSTRUCTIONS
Increase zepbound to 12.5mg- could consider dose adjustment if needed prior to follow up   Look for ways to make smaller goals to keep away from all or nothing mentality  Great work with mindfulness around sweet cravings and habits   Follow up 4 months

## 2025-01-30 NOTE — NURSING NOTE
Current patient location: Patient declined to provide     Is the patient currently in the state of [place]? YES    Visit mode: VIDEO    If the visit is dropped, the patient can be reconnected by:VIDEO VISIT: Text to cell phone:   Telephone Information:   Mobile [phone]       Will anyone else be joining the visit? NO  (If patient encounters technical issues they should call [phone]    Are changes needed to the allergy or medication list? No    Are refills needed on medications prescribed by this physician? Discuss with provider--zepbound dosage increase    Rooming Documentation:  Questionnaire(s) completed    Reason for visit: RECHECK    Lucia LEVINEF

## 2025-02-04 ENCOUNTER — TELEPHONE (OUTPATIENT)
Dept: ENDOCRINOLOGY | Facility: CLINIC | Age: 60
End: 2025-02-04
Payer: COMMERCIAL

## 2025-02-04 NOTE — TELEPHONE ENCOUNTER
Patient confirmed scheduled appointment:  Date: 7/24/25  Time: 7 am  Visit type: CHARLES SMALL  Provider: Charis Fuller  Location: virtual  Testing/imaging: n/a  Additional notes: n/a

## 2025-06-17 ENCOUNTER — MYC REFILL (OUTPATIENT)
Dept: ENDOCRINOLOGY | Facility: CLINIC | Age: 60
End: 2025-06-17
Payer: COMMERCIAL

## 2025-06-17 DIAGNOSIS — E66.01 CLASS 2 SEVERE OBESITY WITH SERIOUS COMORBIDITY AND BODY MASS INDEX (BMI) OF 38.0 TO 38.9 IN ADULT, UNSPECIFIED OBESITY TYPE (H): ICD-10-CM

## 2025-06-17 DIAGNOSIS — E66.812 CLASS 2 SEVERE OBESITY WITH SERIOUS COMORBIDITY AND BODY MASS INDEX (BMI) OF 38.0 TO 38.9 IN ADULT, UNSPECIFIED OBESITY TYPE (H): ICD-10-CM

## 2025-06-17 DIAGNOSIS — G47.33 OSA (OBSTRUCTIVE SLEEP APNEA): ICD-10-CM
